# Patient Record
Sex: MALE | Race: WHITE | NOT HISPANIC OR LATINO | Employment: FULL TIME | URBAN - METROPOLITAN AREA
[De-identification: names, ages, dates, MRNs, and addresses within clinical notes are randomized per-mention and may not be internally consistent; named-entity substitution may affect disease eponyms.]

---

## 2018-07-23 PROBLEM — R60.0 PEDAL EDEMA: Status: ACTIVE | Noted: 2018-07-23

## 2018-07-23 PROBLEM — G47.33 OSA ON CPAP: Status: ACTIVE | Noted: 2018-07-23

## 2018-07-23 PROBLEM — E66.01 MORBID OBESITY: Status: ACTIVE | Noted: 2018-07-23

## 2018-07-23 PROBLEM — Z00.00 ROUTINE PHYSICAL EXAMINATION: Status: ACTIVE | Noted: 2018-07-23

## 2018-07-23 PROBLEM — I48.0 PAROXYSMAL ATRIAL FIBRILLATION: Status: ACTIVE | Noted: 2018-07-23

## 2018-08-30 ENCOUNTER — OFFICE VISIT (OUTPATIENT)
Dept: ORTHOPEDICS | Facility: CLINIC | Age: 53
End: 2018-08-30
Payer: COMMERCIAL

## 2018-08-30 VITALS
BODY MASS INDEX: 39.17 KG/M2 | DIASTOLIC BLOOD PRESSURE: 86 MMHG | HEIGHT: 75 IN | WEIGHT: 315 LBS | HEART RATE: 83 BPM | SYSTOLIC BLOOD PRESSURE: 137 MMHG

## 2018-08-30 DIAGNOSIS — M17.12 PRIMARY OSTEOARTHRITIS OF LEFT KNEE: Primary | ICD-10-CM

## 2018-08-30 PROCEDURE — 73562 X-RAY EXAM OF KNEE 3: CPT | Mod: LT,,, | Performed by: ORTHOPAEDIC SURGERY

## 2018-08-30 PROCEDURE — 3008F BODY MASS INDEX DOCD: CPT | Mod: ,,, | Performed by: ORTHOPAEDIC SURGERY

## 2018-08-30 PROCEDURE — 20610 DRAIN/INJ JOINT/BURSA W/O US: CPT | Mod: LT,,, | Performed by: ORTHOPAEDIC SURGERY

## 2018-08-30 PROCEDURE — 99213 OFFICE O/P EST LOW 20 MIN: CPT | Mod: 25,,, | Performed by: ORTHOPAEDIC SURGERY

## 2018-08-30 RX ORDER — METHYLPREDNISOLONE ACETATE 40 MG/ML
40 INJECTION, SUSPENSION INTRA-ARTICULAR; INTRALESIONAL; INTRAMUSCULAR; SOFT TISSUE
Status: DISCONTINUED | OUTPATIENT
Start: 2018-08-30 | End: 2018-08-30 | Stop reason: HOSPADM

## 2018-08-30 RX ADMIN — METHYLPREDNISOLONE ACETATE 40 MG: 40 INJECTION, SUSPENSION INTRA-ARTICULAR; INTRALESIONAL; INTRAMUSCULAR; SOFT TISSUE at 08:08

## 2018-08-30 NOTE — PROCEDURES
Large Joint Aspiration/Injection: L knee  Date/Time: 8/30/2018 8:39 AM  Performed by: Rocco Alexander MD  Authorized by: Rocco Alexander MD     Consent Done?:  Yes (Verbal)  Indications:  Pain  Procedure site marked: Yes    Timeout: Prior to procedure the correct patient, procedure, and site was verified      Location:  Knee  Site:  L knee  Prep: Patient was prepped and draped in usual sterile fashion    Needle size:  25 G  Medications:  40 mg methylPREDNISolone acetate 40 mg/mL; 40 mg methylPREDNISolone acetate 40 mg/mL  Patient tolerance:  Patient tolerated the procedure well with no immediate complications

## 2018-08-30 NOTE — PROGRESS NOTES
[facility]    Subjective:     Chief Complaint:   Chief Complaint   Patient presents with    Left Knee - Pain     Left knee pain x 1 month. States that he started working out and that the pain increased after working out. States that he would like to get an injection today if possible.        Past Medical History:   Diagnosis Date    Atrial fibrillation 2006    Cardioversion    Atrial fibrillation     Hypertension     Sleep apnea        Past Surgical History:   Procedure Laterality Date    CARDIOVERSION  2006    X2    TONSILLECTOMY         Current Outpatient Medications   Medication Sig    clonazePAM (KLONOPIN) 1 MG tablet Take 1 tablet (1 mg total) by mouth 2 (two) times daily as needed for Anxiety.    lisinopril 10 MG tablet Take 10 mg by mouth once daily.     No current facility-administered medications for this visit.        Review of patient's allergies indicates:  No Known Allergies    Family History   Problem Relation Age of Onset    Hypertension Mother     Hypertension Father     Heart disease Father        Social History     Socioeconomic History    Marital status:      Spouse name: Not on file    Number of children: Not on file    Years of education: Not on file    Highest education level: Not on file   Social Needs    Financial resource strain: Not on file    Food insecurity - worry: Not on file    Food insecurity - inability: Not on file    Transportation needs - medical: Not on file    Transportation needs - non-medical: Not on file   Occupational History    Not on file   Tobacco Use    Smoking status: Never Smoker    Smokeless tobacco: Never Used   Substance and Sexual Activity    Alcohol use: Yes     Alcohol/week: 2.5 oz     Types: 5 drink(s) per week     Comment: Socially    Drug use: No    Sexual activity: Not on file   Other Topics Concern    Not on file   Social History Narrative    Not on file       History of present illness: Patient comes in today  for the left knee. He's having a lot of achy pain in the left knee. A lot of grinding and popping. He is status post arthroscopy about a year ago. 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      Review of Systems:    Constitution: Negative for chills, fever, and sweats.  Negative for unexplained weight loss.    HENT:  Negative for headaches and blurry vision.    Cardiovascular:Negative for chest pain or irregular heart beat. Negative for hypertension.    Respiratory:  Negative for cough and shortness of breath.    Gastrointestinal: Negative for abdominal pain, heartburn, melena, nausea, and vomitting.    Genitourinary:  Negative bladder incontinence and dysuria.    Musculoskeletal:  See HPI for details.     Neurological: Negative for numbness.    Psychiatric/Behavioral: Negative for depression.  The patient is not nervous/anxious.      Endocrine: Negative for polyuria    Hematologic/Lymphatic: Negative for bleeding problem.  Does not bruise/bleed easily.    Skin: Negative for poor would healing and rash    Objective:      Physical Examination:    Vital Signs:    Vitals:    08/30/18 0807   BP: 137/86   Pulse: 83       Body mass index is 43.75 kg/m².    This a well-developed, well nourished patient in no acute distress.  They are alert and oriented and cooperative to examination.        Patient has full range of motion of the left knee. His knee is stable to varus valgus anterior posterior stresses he has a 1+ effusion. Significant anterior crepitus. Full range of motion of the right knee.  Pertinent New Results:    XRAY Report / Interpretation:   AP lateral and sunrise views of the left knee done in the office today demonstrate 3 compartment spurring. Moderate osteoarthritis.    Assessment/Plan:      Osteophyte is the left knee. I injected the left knee with Depo-Medrol and lidocaine. He will follow-up in one month      This note was created using Dragon voice recognition  software that occasionally misinterpreted phrases or words.

## 2018-10-22 PROBLEM — Z00.00 ROUTINE PHYSICAL EXAMINATION: Status: RESOLVED | Noted: 2018-07-23 | Resolved: 2018-10-22

## 2019-03-09 ENCOUNTER — HOSPITAL ENCOUNTER (INPATIENT)
Facility: HOSPITAL | Age: 54
LOS: 11 days | Discharge: HOME | DRG: 167 | End: 2019-03-20
Attending: EMERGENCY MEDICINE | Admitting: INTERNAL MEDICINE
Payer: COMMERCIAL

## 2019-03-09 DIAGNOSIS — S27.321A CONTUSION OF LEFT LUNG, INITIAL ENCOUNTER: ICD-10-CM

## 2019-03-09 DIAGNOSIS — S22.42XA CLOSED FRACTURE OF MULTIPLE RIBS OF LEFT SIDE, INITIAL ENCOUNTER: Primary | ICD-10-CM

## 2019-03-09 DIAGNOSIS — I48.0 PAROXYSMAL ATRIAL FIBRILLATION: ICD-10-CM

## 2019-03-09 DIAGNOSIS — S22.42XA MULTIPLE CLOSED FRACTURES OF RIBS OF LEFT SIDE: ICD-10-CM

## 2019-03-09 DIAGNOSIS — G47.33 OSA ON CPAP: ICD-10-CM

## 2019-03-09 DIAGNOSIS — E66.01 MORBID OBESITY: ICD-10-CM
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
## 2019-03-09 DIAGNOSIS — R07.9 CHEST PAIN: ICD-10-CM

## 2019-03-09 PROBLEM — F41.1 GAD (GENERALIZED ANXIETY DISORDER): Status: ACTIVE | Noted: 2019-03-09

## 2019-03-09 LAB
AMORPH CRY URNS QL MICRO: NORMAL
ANION GAP SERPL CALC-SCNC: 12 MMOL/L
BACTERIA #/AREA URNS HPF: NORMAL /HPF
BASOPHILS # BLD AUTO: 0 K/UL
BASOPHILS NFR BLD: 0.2 %
BILIRUB UR QL STRIP: ABNORMAL
BUN SERPL-MCNC: 15 MG/DL
CALCIUM SERPL-MCNC: 9.5 MG/DL
CHLORIDE SERPL-SCNC: 108 MMOL/L
CLARITY UR: CLEAR
CO2 SERPL-SCNC: 18 MMOL/L
COLOR UR: YELLOW
CREAT SERPL-MCNC: 1.1 MG/DL
DIFFERENTIAL METHOD: ABNORMAL
EOSINOPHIL # BLD AUTO: 0 K/UL
EOSINOPHIL NFR BLD: 0 %
ERYTHROCYTE [DISTWIDTH] IN BLOOD BY AUTOMATED COUNT: 15.2 %
EST. GFR  (AFRICAN AMERICAN): >60 ML/MIN/1.73 M^2
EST. GFR  (NON AFRICAN AMERICAN): >60 ML/MIN/1.73 M^2
GLUCOSE SERPL-MCNC: 175 MG/DL
GLUCOSE UR QL STRIP: NEGATIVE
HCT VFR BLD AUTO: 44.1 %
HGB BLD-MCNC: 14.5 G/DL
HGB UR QL STRIP: ABNORMAL
HYALINE CASTS #/AREA URNS LPF: 1 /LPF
KETONES UR QL STRIP: NEGATIVE
LEUKOCYTE ESTERASE UR QL STRIP: NEGATIVE
LYMPHOCYTES # BLD AUTO: 0.8 K/UL
LYMPHOCYTES NFR BLD: 3.2 %
MCH RBC QN AUTO: 28.8 PG
MCHC RBC AUTO-ENTMCNC: 32.9 G/DL
MCV RBC AUTO: 88 FL
MICROSCOPIC COMMENT: NORMAL
MONOCYTES # BLD AUTO: 1.1 K/UL
MONOCYTES NFR BLD: 4.5 %
NEUTROPHILS # BLD AUTO: 22.5 K/UL
NEUTROPHILS NFR BLD: 92.1 %
NITRITE UR QL STRIP: NEGATIVE
PH UR STRIP: 5 [PH] (ref 5–8)
PLATELET # BLD AUTO: 280 K/UL
PMV BLD AUTO: 7.5 FL
POCT GLUCOSE: 209 MG/DL (ref 70–110)
POTASSIUM SERPL-SCNC: 3.8 MMOL/L
PROT UR QL STRIP: ABNORMAL
RBC # BLD AUTO: 5.03 M/UL
RBC #/AREA URNS HPF: 1 /HPF (ref 0–4)
SODIUM SERPL-SCNC: 138 MMOL/L
SP GR UR STRIP: >=1.03 (ref 1–1.03)
SQUAMOUS #/AREA URNS HPF: 2 /HPF
TROPONIN I SERPL DL<=0.01 NG/ML-MCNC: <0.006 NG/ML
TROPONIN I SERPL DL<=0.01 NG/ML-MCNC: <0.006 NG/ML
URN SPEC COLLECT METH UR: ABNORMAL
UROBILINOGEN UR STRIP-ACNC: NEGATIVE EU/DL
WBC # BLD AUTO: 24.4 K/UL
WBC #/AREA URNS HPF: 1 /HPF (ref 0–5)

## 2019-03-09 PROCEDURE — 85025 COMPLETE CBC W/AUTO DIFF WBC: CPT

## 2019-03-09 PROCEDURE — 63600175 PHARM REV CODE 636 W HCPCS: Performed by: INTERNAL MEDICINE

## 2019-03-09 PROCEDURE — 84484 ASSAY OF TROPONIN QUANT: CPT

## 2019-03-09 PROCEDURE — 81000 URINALYSIS NONAUTO W/SCOPE: CPT

## 2019-03-09 PROCEDURE — 96375 TX/PRO/DX INJ NEW DRUG ADDON: CPT

## 2019-03-09 PROCEDURE — 96376 TX/PRO/DX INJ SAME DRUG ADON: CPT

## 2019-03-09 PROCEDURE — 63600175 PHARM REV CODE 636 W HCPCS: Performed by: EMERGENCY MEDICINE

## 2019-03-09 PROCEDURE — 94760 N-INVAS EAR/PLS OXIMETRY 1: CPT

## 2019-03-09 PROCEDURE — 25000003 PHARM REV CODE 250: Performed by: INTERNAL MEDICINE

## 2019-03-09 PROCEDURE — 99284 EMERGENCY DEPT VISIT MOD MDM: CPT | Mod: 25

## 2019-03-09 PROCEDURE — 25000003 PHARM REV CODE 250: Performed by: EMERGENCY MEDICINE

## 2019-03-09 PROCEDURE — 83036 HEMOGLOBIN GLYCOSYLATED A1C: CPT

## 2019-03-09 PROCEDURE — 80048 BASIC METABOLIC PNL TOTAL CA: CPT

## 2019-03-09 PROCEDURE — 12000002 HC ACUTE/MED SURGE SEMI-PRIVATE ROOM

## 2019-03-09 PROCEDURE — 36415 COLL VENOUS BLD VENIPUNCTURE: CPT

## 2019-03-09 PROCEDURE — 96374 THER/PROPH/DIAG INJ IV PUSH: CPT

## 2019-03-09 RX ORDER — OXYCODONE AND ACETAMINOPHEN 10; 325 MG/1; MG/1
1 TABLET ORAL EVERY 4 HOURS PRN
Status: DISCONTINUED | OUTPATIENT
Start: 2019-03-09 | End: 2019-03-10

## 2019-03-09 RX ORDER — LANOLIN ALCOHOL/MO/W.PET/CERES
800 CREAM (GRAM) TOPICAL
Status: DISCONTINUED | OUTPATIENT
Start: 2019-03-09 | End: 2019-03-20 | Stop reason: HOSPADM

## 2019-03-09 RX ORDER — POTASSIUM CHLORIDE 20 MEQ/15ML
40 SOLUTION ORAL
Status: DISCONTINUED | OUTPATIENT
Start: 2019-03-09 | End: 2019-03-20 | Stop reason: HOSPADM

## 2019-03-09 RX ORDER — ENOXAPARIN SODIUM 100 MG/ML
40 INJECTION SUBCUTANEOUS EVERY 24 HOURS
Status: DISCONTINUED | OUTPATIENT
Start: 2019-03-09 | End: 2019-03-14

## 2019-03-09 RX ORDER — SODIUM CHLORIDE 9 MG/ML
INJECTION, SOLUTION INTRAVENOUS CONTINUOUS
Status: ACTIVE | OUTPATIENT
Start: 2019-03-09 | End: 2019-03-10

## 2019-03-09 RX ORDER — GLUCAGON 1 MG
1 KIT INJECTION
Status: DISCONTINUED | OUTPATIENT
Start: 2019-03-09 | End: 2019-03-20 | Stop reason: HOSPADM

## 2019-03-09 RX ORDER — HYDROMORPHONE HYDROCHLORIDE 2 MG/ML
2 INJECTION, SOLUTION INTRAMUSCULAR; INTRAVENOUS; SUBCUTANEOUS
Status: COMPLETED | OUTPATIENT
Start: 2019-03-09 | End: 2019-03-09

## 2019-03-09 RX ORDER — LISINOPRIL 10 MG/1
10 TABLET ORAL DAILY
Status: DISCONTINUED | OUTPATIENT
Start: 2019-03-10 | End: 2019-03-12

## 2019-03-09 RX ORDER — ONDANSETRON 2 MG/ML
4 INJECTION INTRAMUSCULAR; INTRAVENOUS EVERY 8 HOURS PRN
Status: DISCONTINUED | OUTPATIENT
Start: 2019-03-09 | End: 2019-03-20 | Stop reason: HOSPADM

## 2019-03-09 RX ORDER — IBUPROFEN 200 MG
16 TABLET ORAL
Status: DISCONTINUED | OUTPATIENT
Start: 2019-03-09 | End: 2019-03-20 | Stop reason: HOSPADM

## 2019-03-09 RX ORDER — IBUPROFEN 200 MG
24 TABLET ORAL
Status: DISCONTINUED | OUTPATIENT
Start: 2019-03-09 | End: 2019-03-20 | Stop reason: HOSPADM

## 2019-03-09 RX ORDER — HYDROMORPHONE HYDROCHLORIDE 2 MG/ML
1 INJECTION, SOLUTION INTRAMUSCULAR; INTRAVENOUS; SUBCUTANEOUS EVERY 4 HOURS PRN
Status: DISCONTINUED | OUTPATIENT
Start: 2019-03-09 | End: 2019-03-20 | Stop reason: HOSPADM

## 2019-03-09 RX ORDER — INSULIN ASPART 100 [IU]/ML
0-5 INJECTION, SOLUTION INTRAVENOUS; SUBCUTANEOUS
Status: DISCONTINUED | OUTPATIENT
Start: 2019-03-09 | End: 2019-03-20 | Stop reason: HOSPADM

## 2019-03-09 RX ORDER — SODIUM CHLORIDE 0.9 % (FLUSH) 0.9 %
5 SYRINGE (ML) INJECTION
Status: DISCONTINUED | OUTPATIENT
Start: 2019-03-09 | End: 2019-03-20 | Stop reason: HOSPADM

## 2019-03-09 RX ORDER — CLONAZEPAM 1 MG/1
1 TABLET ORAL 2 TIMES DAILY PRN
Status: DISCONTINUED | OUTPATIENT
Start: 2019-03-09 | End: 2019-03-10

## 2019-03-09 RX ORDER — AMOXICILLIN 250 MG
1 CAPSULE ORAL 2 TIMES DAILY
Status: DISCONTINUED | OUTPATIENT
Start: 2019-03-09 | End: 2019-03-20 | Stop reason: HOSPADM

## 2019-03-09 RX ORDER — KETAMINE HYDROCHLORIDE 10 MG/ML
40 INJECTION, SOLUTION INTRAMUSCULAR; INTRAVENOUS
Status: COMPLETED | OUTPATIENT
Start: 2019-03-09 | End: 2019-03-09

## 2019-03-09 RX ORDER — ACETAMINOPHEN 325 MG/1
650 TABLET ORAL EVERY 8 HOURS PRN
Status: DISCONTINUED | OUTPATIENT
Start: 2019-03-09 | End: 2019-03-17

## 2019-03-09 RX ADMIN — PROMETHAZINE HYDROCHLORIDE 6.25 MG: 25 INJECTION INTRAMUSCULAR; INTRAVENOUS at 07:03

## 2019-03-09 RX ADMIN — CLONAZEPAM 1 MG: 1 TABLET ORAL at 07:03

## 2019-03-09 RX ADMIN — ENOXAPARIN SODIUM 40 MG: 100 INJECTION SUBCUTANEOUS at 08:03

## 2019-03-09 RX ADMIN — HYDROMORPHONE HYDROCHLORIDE 2 MG: 2 INJECTION, SOLUTION INTRAMUSCULAR; INTRAVENOUS; SUBCUTANEOUS at 03:03

## 2019-03-09 RX ADMIN — HYDROMORPHONE HYDROCHLORIDE 1 MG: 2 INJECTION, SOLUTION INTRAMUSCULAR; INTRAVENOUS; SUBCUTANEOUS at 10:03

## 2019-03-09 RX ADMIN — KETAMINE HYDROCHLORIDE 40 MG: 10 INJECTION, SOLUTION INTRAMUSCULAR; INTRAVENOUS at 02:03

## 2019-03-09 RX ADMIN — OXYCODONE HYDROCHLORIDE AND ACETAMINOPHEN 1 TABLET: 10; 325 TABLET ORAL at 07:03

## 2019-03-09 RX ADMIN — HYDROMORPHONE HYDROCHLORIDE 2 MG: 2 INJECTION, SOLUTION INTRAMUSCULAR; INTRAVENOUS; SUBCUTANEOUS at 05:03

## 2019-03-09 RX ADMIN — SODIUM CHLORIDE: 0.9 INJECTION, SOLUTION INTRAVENOUS at 08:03

## 2019-03-09 NOTE — ED NOTES
""Room not yet clean" per 3rd Floor staff. Pt/family aware of need to continue in ED presently. Sitting in W/C for comfort. Exam bed linens changed @ this time & pt gowned for admission.  Tolerated well s/p meds  "

## 2019-03-09 NOTE — ED NOTES
Pt suddenly became very agitated yelling and screaming as RN walked by room, pt forcefully and quickly moved himself to the very end of the bed almost out of bed , re orientated and stopped from getting out of bed security and family at bedside. Pt reassured and calmed able to move self back into bed laying down. Pt states that he did not know how to process pain and it was too much so he urinated on himself.   MD brought to bedside questions answered with family. Primary RN updated on pt status

## 2019-03-09 NOTE — ED NOTES
Lying on Rt side for comfort. Supported in position by 2 ea pillows. Tolerated well. Awaiting availability of CT

## 2019-03-09 NOTE — ED NOTES
Awaiting radiology read of chest CT. Offered additional pillows for comfort @ this time. Family x 2 @ bedside.

## 2019-03-09 NOTE — SUBJECTIVE & OBJECTIVE
Past Medical History:   Diagnosis Date    Atrial fibrillation 2006    Cardioversion    Atrial fibrillation     Hypertension     Sleep apnea        Past Surgical History:   Procedure Laterality Date    CARDIOVERSION  2006    X2    REPAIR, HERNIA, UMBILICAL, AGE 4 YEARS OR OLDER N/A 11/19/2013    Performed by Ad[facility] Horne MD at [facility] OR    TONSILLECTOMY         Review of patient's allergies indicates:  No Known Allergies    No current facility-administered medications on file prior to encounter.      Current Outpatient Medications on File Prior to Encounter   Medication Sig    clonazePAM (KLONOPIN) 1 MG tablet Take 1 tablet (1 mg total) by mouth 2 (two) times daily as needed for Anxiety.    lisinopril 10 MG tablet Take 10 mg by mouth once daily.     Family History     Problem Relation (Age of Onset)    Heart disease Father    Hypertension Mother, Father        Tobacco Use    Smoking status: Never Smoker    Smokeless tobacco: Never Used   Substance and Sexual Activity    Alcohol use: Yes     Alcohol/week: 2.5 oz     Types: 5 drink(s) per week     Comment: Socially    Drug use: No    Sexual activity: Not on file     Review of Systems   Constitutional: Negative for chills and fever.   HENT: Negative for congestion and sore throat.    Eyes: Negative for visual disturbance.   Respiratory: Positive for shortness of breath. Negative for cough.    Cardiovascular: Positive for chest pain (Pleuritic).   Gastrointestinal: Negative for abdominal distention, abdominal pain, constipation, diarrhea, nausea and vomiting.   Genitourinary: Negative for dysuria and hematuria.   Musculoskeletal: Positive for back pain.   Skin: Positive for rash.   Neurological: Negative for weakness, numbness and headaches.   Psychiatric/Behavioral: Negative for agitation and confusion.     Objective:     Vital Signs (Most Recent):  Temp: 97.3 °F (36.3 °C) (03/09/19 1425)  Pulse: 109 (03/09/19 1708)  Resp: (!) 22 (03/09/19 1425)  BP:  (!) 161/88 (03/09/19 1538)  SpO2: 96 % (03/09/19 1708) Vital Signs (24h Range):  Temp:  [97.3 °F (36.3 °C)] 97.3 °F (36.3 °C)  Pulse:  [106-117] 109  Resp:  [22] 22  SpO2:  [93 %-96 %] 96 %  BP: (150-176)/() 161/88     Weight: (!) 167.8 kg (370 lb)  Body mass index is 46.25 kg/m².    Physical Exam   Constitutional: He is oriented to person, place, and time. He appears well-developed and well-nourished. No distress.   HENT:   Head: Normocephalic and atraumatic.   Eyes: EOM are normal. Pupils are equal, round, and reactive to light.   Neck: Normal range of motion. Neck supple.   Cardiovascular: Regular rhythm.   Tachy but regular   Pulmonary/Chest: Effort normal and breath sounds normal. He has no rales.   Abdominal: Soft. Bowel sounds are normal. There is no tenderness.   Musculoskeletal: Normal range of motion.   Neurological: He is alert and oriented to person, place, and time.   Skin: Skin is warm and dry.   Excoriations on L lateral shoulder from fall         CRANIAL NERVES     CN III, IV, VI   Pupils are equal, round, and reactive to light.  Extraocular motions are normal.        Significant Labs:   CBC:   Recent Labs   Lab 03/09/19  1541   WBC 24.40*   HGB 14.5   HCT 44.1        CMP:   Recent Labs   Lab 03/09/19  1541      K 3.8      CO2 18*   *   BUN 15   CREATININE 1.1   CALCIUM 9.5   ANIONGAP 12   EGFRNONAA >60     Cardiac Markers: No results for input(s): CKMB, MYOGLOBIN, BNP, TROPISTAT in the last 48 hours.  TSH: No results for input(s): TSH in the last 4320 hours.  Urine Culture: No results for input(s): LABURIN in the last 48 hours.  Urine Studies: No results for input(s): COLORU, APPEARANCEUA, PHUR, SPECGRAV, PROTEINUA, GLUCUA, KETONESU, BILIRUBINUA, OCCULTUA, NITRITE, UROBILINOGEN, LEUKOCYTESUR, RBCUA, WBCUA, BACTERIA, SQUAMEPITHEL, HYALINECASTS in the last 48 hours.    Invalid input(s): JOCE    Significant Imaging: I have reviewed and interpreted all pertinent  imaging results/findings within the past 24 hours.     CT chest  Impression:       Acute fractures of the left 2nd through 8th ribs.  Edge to edge apposition of the lower ribs with bayonet shortening of the 5th rib.  There is  subcutaneous emphysema however a pneumothorax is not seen.  Three small bands of pulmonary contusion noted in the left upper lobe.  Other fractures are not identified.  Mediastinal hemorrhage or hematoma is not seen.     XR L Scapula  Impression:       Negative plain x-rays of the left scapula.  Acute fractures of the left 2nd 3rd 4th and 5th ribs with subcutaneous emphysema and possible pneumothorax.    Results were discussed with Dr. Wood and a CT of the chest will be obtained.     XR L Shoulder  Impression:       Acute fractures of the left 2nd 3rd 4th and 5th ribs with a possible small left apical pneumothorax and a small amount of subcutaneous emphysema.  A fracture of the scapula or of the shoulder itself is not seen.  Dislocation is not noted.  A GREGORIO or on

## 2019-03-09 NOTE — HPI
54 y.o. male  has a past medical history of Atrial fibrillation (2006), Atrial fibrillation, Hypertension, and Sleep apnea who presents to the ED via EMS for evaluation of left shoulder pain. Pt reports being in his normal state of health until just prior to arrival when he began experiencing severe L shoulder pain after falling from horse. In the ED pt was treated with multiple doses of IV dilaudid before experiencing any relief. Imaging preformed in the ED showed multiple rib fractures. Given significant pain medication requirement Hospital medicine was consulted for admission.     Other than presenting compliant pt reports doing well. Denies prior complaints.

## 2019-03-09 NOTE — ED PROVIDER NOTES
Encounter Date: 3/9/2019    SCRIBE #1 NOTE: I, Yee Chacon, am scribing for, and in the presence of, Bret Wood III, MD.       History     Chief Complaint   Patient presents with    Fall     fell off of horse co lt shoulder pain       Time seen by provider: 2:27 PM on 03/09/2019    Bird Feng is a 54 y.o. male who presents to the ED via EMS for evaluation of left shoulder pain. Just prior to arrival, the patient fell off a horse onto his left side, resulting in severe left shoulder pain. Upon arrival to the ED, the patient's left arm is in a sling and he is screaming in pain despite receiving 2mg of Dilaudid en route.  The patient denies neck pain or hitting his head. PMHx: HTN, Atrial Fibrillation, MANUELA. No pertinent SHx noted. Never Smoker.  No known drug allergies noted.       The history is provided by the patient.     Review of patient's allergies indicates:  No Known Allergies  Past Medical History:   Diagnosis Date    Atrial fibrillation 2006    Cardioversion    Atrial fibrillation     Hypertension     Sleep apnea      Past Surgical History:   Procedure Laterality Date    CARDIOVERSION  2006    X2    REPAIR, HERNIA, UMBILICAL, AGE 4 YEARS OR OLDER N/A 11/19/2013    Performed by Ad[facility]orne MD at [facility] OR    TONSILLECTOMY       Family History   Problem Relation Age of Onset    Hypertension Mother     Hypertension Father     Heart disease Father      Social History     Tobacco Use    Smoking status: Never Smoker    Smokeless tobacco: Never Used   Substance Use Topics    Alcohol use: Yes     Alcohol/week: 2.5 oz     Types: 5 drink(s) per week     Comment: Socially    Drug use: No     Review of Systems   Constitutional: Negative for activity change, appetite change, chills, fatigue and fever.   Eyes: Negative for visual disturbance.   Respiratory: Negative for apnea and shortness of breath.    Cardiovascular: Negative for chest pain and palpitations.   Gastrointestinal: Negative  for abdominal distention and abdominal pain.   Genitourinary: Negative for difficulty urinating.   Musculoskeletal: Positive for arthralgias (left shoulder). Negative for neck pain.   Skin: Negative for pallor and rash.   Neurological: Negative for headaches.   Hematological: Does not bruise/bleed easily.   Psychiatric/Behavioral: Negative for agitation.       Physical Exam     Initial Vitals [03/09/19 1425]   BP Pulse Resp Temp SpO2   (!) 176/100 110 (!) 22 97.3 °F (36.3 °C) 96 %      MAP       --         Physical Exam    Nursing note and vitals reviewed.  Constitutional: He appears well-developed and well-nourished.   HENT:   Head: Normocephalic and atraumatic.   Eyes: Conjunctivae are normal.   Neck: Normal range of motion. Neck supple.   Cardiovascular: Normal rate, regular rhythm and normal heart sounds. Exam reveals no gallop and no friction rub.    No murmur heard.  Pulmonary/Chest: Breath sounds normal. No respiratory distress. He has no wheezes. He has no rhonchi. He has no rales.   No clavicular tenderness.    Abdominal: Soft. He exhibits no distension. There is no tenderness.   Musculoskeletal: Normal range of motion.        Cervical back: He exhibits tenderness.   Tenderness over the left scapula without crepitus. Left shoulder has limited ROM d/t pain.    Neurological: He is alert and oriented to person, place, and time.   Skin: Skin is warm and dry. Abrasion noted.   Abrasions over left scapula and posterior arm.    Psychiatric: He has a normal mood and affect.         ED Course   Procedures  Labs Reviewed   CBC W/ AUTO DIFFERENTIAL - Abnormal; Notable for the following components:       Result Value    WBC 24.40 (*)     RDW 15.2 (*)     MPV 7.5 (*)     Gran # (ANC) 22.5 (*)     Lymph # 0.8 (*)     Mono # 1.1 (*)     Gran% 92.1 (*)     Lymph% 3.2 (*)     All other components within normal limits   BASIC METABOLIC PANEL - Abnormal; Notable for the following components:    CO2 18 (*)     Glucose 175 (*)      All other components within normal limits          Imaging Results          CT Chest Without Contrast (Final result)  Result time 03/09/19 17:13:21    Final result by Royal Mathew Jr., MD (03/09/19 17:13:21)                 Impression:      Acute fractures of the left 2nd through 8th ribs.  Edge to edge apposition of the lower ribs with bayonet shortening of the 5th rib.  There is  subcutaneous emphysema however a pneumothorax is not seen.  Three small bands of pulmonary contusion noted in the left upper lobe.  Other fractures are not identified.  Mediastinal hemorrhage or hematoma is not seen.      Electronically signed by: Royal Mathew MD  Date:    03/09/2019  Time:    17:13             Narrative:    EXAMINATION:  CT CHEST WITHOUT CONTRAST    CLINICAL HISTORY:  Rib fractures;    TECHNIQUE:  Low dose axial images, sagittal and coronal reformations were obtained from the thoracic inlet to the lung bases. Contrast was not administered.  The patient was prone on the CT table due to pain but the images or reversed for interpretation.  The    COMPARISON:  Shoulder and scapula x-rays of 9 March 2019.    FINDINGS:  There are acute fractures of the left 2nd 3rd 4th 5th 6th 7th and 8th ribs laterally.  The 8th, 7th and 6th ribs display edge to edge apposition but the 5th rib fragments display mild bayonet shortening.  There is subcutaneous emphysema seen in the left chest wall and left shoulder.  Acute fractures of the right ribs, Of the thoracic spine, or of the blade of the scapula is not seen. The sternum appears intact    In the mediastinum the heart is not enlarged.  There is a large amount of pericardial fat demonstrated.  A hemorrhage or hematoma within the mediastinum is not seen.    On this study a pneumothorax is not seen.  There is however pulmonary contusion identified in the left upper lobe in 3 small bands projecting toward the hilum.                               X-Ray Shoulder 2 or More Views  Left (Final result)  Result time 03/09/19 15:23:22    Final result by Royal Mathew Jr., MD (03/09/19 15:23:22)                 Impression:      Acute fractures of the left 2nd 3rd 4th and 5th ribs with a possible small left apical pneumothorax and a small amount of subcutaneous emphysema.  A fracture of the scapula or of the shoulder itself is not seen.  Dislocation is not noted.  A GREGORIO or on      Electronically signed by: Royal Mathew MD  Date:    03/09/2019  Time:    15:23             Narrative:    EXAMINATION:  XR SHOULDER COMPLETE 2 OR MORE VIEWS LEFT    CLINICAL HISTORY:  fall;    TECHNIQUE:  Two or three views of the left shoulder were performed.    COMPARISON:  Prior shoulder of March 9, 2019 at 14:53 1 -10 which are down the    FINDINGS:  No dislocation is seen.  The humerus scapula and clavicle are intact.  The acromioclavicular and glenohumeral joints are intact.    The patient however is seen to have acute fractures of the left 2nd 3rd 4th and 5th ribs and a a small amount of subcutaneous emphysema.  A left apical pneumothorax is also suspected.                               X-Ray Scapula Left (Final result)  Result time 03/09/19 15:24:35    Final result by Royal Mathew Jr., MD (03/09/19 15:24:35)                 Impression:      Negative plain x-rays of the left scapula.  Acute fractures of the left 2nd 3rd 4th and 5th ribs with subcutaneous emphysema and possible pneumothorax.    Results were discussed with Dr. Wood and a CT of the chest will be obtained.      Electronically signed by: Royal Mathew MD  Date:    03/09/2019  Time:    15:24             Narrative:    EXAMINATION:  XR SCAPULA LEFT    CLINICAL HISTORY:  Unspecified fall, initial encounter    TECHNIQUE:  Two views of the left scapula were performed.    COMPARISON:  None    FINDINGS:  On the provided images a fracture of the left scapula is not seen.  The humerus in the clavicle appear intact.  There are however acute  fractures of the left 2nd 3rd 4th and 5th ribs and subcutaneous emphysema noted.  A left pneumothorax is suspected.                                 Medical Decision Making:   History:   Old Medical Records: I decided to obtain old medical records.  Clinical Tests:   Radiological Study: Ordered and Reviewed  ED Management:  54-year-old male presents with severe left shoulder and side pain after falling from a horse.  X-rays independently interpreted by me revealed rib fractures 2 3 and 4.  CT of the chest reveals subcutaneous emphysema without definitive evidence of pneumothorax.  He has displaced fractures of ribs 2 through 8.  There is no mediastinal abnormality.  Cervical spine is cleared clinically with a MacArthur cervical spine rules.  He has no evidence of head injury. He will be admitted for pain control.  Other:   I have discussed this case with another health care provider.       <> Summary of the Discussion: Discussed with Dr. Morgan who will admit the patient       APC / Resident Notes:   I, Dr. Bret Wood III, personally performed the services described in this documentation. All medical record entries made by the scribe were at my direction and in my presence.  I have reviewed the chart and agree that the record reflects my personal performance and is accurate and complete       Scribe Attestation:   Scribe #1: I performed the above scribed service and the documentation accurately describes the services I performed. I attest to the accuracy of the note.               Clinical Impression:     1. Closed fracture of multiple ribs of left side, initial encounter    2. Fall    3. Contusion of left lung, initial encounter          Disposition:   Disposition: Admitted  Condition: Stable                        Bret Wood III, MD  03/09/19 1916

## 2019-03-10 LAB
ANION GAP SERPL CALC-SCNC: 13 MMOL/L
BASOPHILS # BLD AUTO: 0 K/UL
BASOPHILS NFR BLD: 0.1 %
BUN SERPL-MCNC: 18 MG/DL
CALCIUM SERPL-MCNC: 9.4 MG/DL
CHLORIDE SERPL-SCNC: 108 MMOL/L
CO2 SERPL-SCNC: 17 MMOL/L
CREAT SERPL-MCNC: 1.1 MG/DL
DIFFERENTIAL METHOD: ABNORMAL
EOSINOPHIL # BLD AUTO: 0 K/UL
EOSINOPHIL NFR BLD: 0 %
ERYTHROCYTE [DISTWIDTH] IN BLOOD BY AUTOMATED COUNT: 15.4 %
EST. GFR  (AFRICAN AMERICAN): >60 ML/MIN/1.73 M^2
EST. GFR  (NON AFRICAN AMERICAN): >60 ML/MIN/1.73 M^2
ESTIMATED AVG GLUCOSE: 134 MG/DL
GLUCOSE SERPL-MCNC: 172 MG/DL
HBA1C MFR BLD HPLC: 6.3 %
HCT VFR BLD AUTO: 44.3 %
HGB BLD-MCNC: 14.6 G/DL
LYMPHOCYTES # BLD AUTO: 1.3 K/UL
LYMPHOCYTES NFR BLD: 7.5 %
MCH RBC QN AUTO: 29.1 PG
MCHC RBC AUTO-ENTMCNC: 33 G/DL
MCV RBC AUTO: 88 FL
MONOCYTES # BLD AUTO: 1.6 K/UL
MONOCYTES NFR BLD: 9.2 %
NEUTROPHILS # BLD AUTO: 14.4 K/UL
NEUTROPHILS NFR BLD: 83.2 %
PLATELET # BLD AUTO: 301 K/UL
PMV BLD AUTO: 7.7 FL
POCT GLUCOSE: 126 MG/DL (ref 70–110)
POCT GLUCOSE: 147 MG/DL (ref 70–110)
POCT GLUCOSE: 148 MG/DL (ref 70–110)
POCT GLUCOSE: 162 MG/DL (ref 70–110)
POTASSIUM SERPL-SCNC: 4.3 MMOL/L
RBC # BLD AUTO: 5.02 M/UL
SODIUM SERPL-SCNC: 138 MMOL/L
TROPONIN I SERPL DL<=0.01 NG/ML-MCNC: <0.006 NG/ML
TROPONIN I SERPL DL<=0.01 NG/ML-MCNC: <0.006 NG/ML
WBC # BLD AUTO: 17.3 K/UL

## 2019-03-10 PROCEDURE — 94761 N-INVAS EAR/PLS OXIMETRY MLT: CPT

## 2019-03-10 PROCEDURE — 84484 ASSAY OF TROPONIN QUANT: CPT

## 2019-03-10 PROCEDURE — 12000002 HC ACUTE/MED SURGE SEMI-PRIVATE ROOM

## 2019-03-10 PROCEDURE — 99900035 HC TECH TIME PER 15 MIN (STAT)

## 2019-03-10 PROCEDURE — 80048 BASIC METABOLIC PNL TOTAL CA: CPT

## 2019-03-10 PROCEDURE — 25000003 PHARM REV CODE 250: Performed by: NURSE PRACTITIONER

## 2019-03-10 PROCEDURE — 36415 COLL VENOUS BLD VENIPUNCTURE: CPT

## 2019-03-10 PROCEDURE — 63600175 PHARM REV CODE 636 W HCPCS: Performed by: INTERNAL MEDICINE

## 2019-03-10 PROCEDURE — 25000003 PHARM REV CODE 250: Performed by: INTERNAL MEDICINE

## 2019-03-10 PROCEDURE — 85025 COMPLETE CBC W/AUTO DIFF WBC: CPT

## 2019-03-10 RX ORDER — OXYCODONE AND ACETAMINOPHEN 10; 325 MG/1; MG/1
1 TABLET ORAL EVERY 4 HOURS PRN
Status: DISCONTINUED | OUTPATIENT
Start: 2019-03-10 | End: 2019-03-10

## 2019-03-10 RX ORDER — OXYCODONE AND ACETAMINOPHEN 10; 325 MG/1; MG/1
1 TABLET ORAL EVERY 4 HOURS
Status: DISCONTINUED | OUTPATIENT
Start: 2019-03-10 | End: 2019-03-16

## 2019-03-10 RX ORDER — CLONAZEPAM 1 MG/1
1 TABLET ORAL ONCE
Status: COMPLETED | OUTPATIENT
Start: 2019-03-10 | End: 2019-03-10

## 2019-03-10 RX ORDER — MAG HYDROX/ALUMINUM HYD/SIMETH 200-200-20
30 SUSPENSION, ORAL (FINAL DOSE FORM) ORAL EVERY 4 HOURS PRN
Status: DISCONTINUED | OUTPATIENT
Start: 2019-03-10 | End: 2019-03-20 | Stop reason: HOSPADM

## 2019-03-10 RX ORDER — CLONAZEPAM 1 MG/1
1 TABLET ORAL 3 TIMES DAILY PRN
Status: DISCONTINUED | OUTPATIENT
Start: 2019-03-10 | End: 2019-03-20 | Stop reason: HOSPADM

## 2019-03-10 RX ADMIN — CLONAZEPAM 1 MG: 1 TABLET ORAL at 08:03

## 2019-03-10 RX ADMIN — OXYCODONE HYDROCHLORIDE AND ACETAMINOPHEN 1 TABLET: 10; 325 TABLET ORAL at 05:03

## 2019-03-10 RX ADMIN — HYDROMORPHONE HYDROCHLORIDE 1 MG: 2 INJECTION, SOLUTION INTRAMUSCULAR; INTRAVENOUS; SUBCUTANEOUS at 07:03

## 2019-03-10 RX ADMIN — OXYCODONE HYDROCHLORIDE AND ACETAMINOPHEN 1 TABLET: 10; 325 TABLET ORAL at 12:03

## 2019-03-10 RX ADMIN — SENNOSIDES AND DOCUSATE SODIUM 1 TABLET: 8.6; 5 TABLET ORAL at 09:03

## 2019-03-10 RX ADMIN — LISINOPRIL 10 MG: 10 TABLET ORAL at 08:03

## 2019-03-10 RX ADMIN — HYDROMORPHONE HYDROCHLORIDE 1 MG: 2 INJECTION, SOLUTION INTRAMUSCULAR; INTRAVENOUS; SUBCUTANEOUS at 03:03

## 2019-03-10 RX ADMIN — CLONAZEPAM 1 MG: 1 TABLET ORAL at 02:03

## 2019-03-10 RX ADMIN — OXYCODONE HYDROCHLORIDE AND ACETAMINOPHEN 1 TABLET: 10; 325 TABLET ORAL at 09:03

## 2019-03-10 RX ADMIN — ALUMINUM HYDROXIDE, MAGNESIUM HYDROXIDE, AND SIMETHICONE 30 ML: 200; 200; 20 SUSPENSION ORAL at 10:03

## 2019-03-10 RX ADMIN — SENNOSIDES AND DOCUSATE SODIUM 1 TABLET: 8.6; 5 TABLET ORAL at 08:03

## 2019-03-10 RX ADMIN — OXYCODONE HYDROCHLORIDE AND ACETAMINOPHEN 1 TABLET: 10; 325 TABLET ORAL at 10:03

## 2019-03-10 RX ADMIN — HYDROMORPHONE HYDROCHLORIDE 1 MG: 2 INJECTION, SOLUTION INTRAMUSCULAR; INTRAVENOUS; SUBCUTANEOUS at 11:03

## 2019-03-10 RX ADMIN — ENOXAPARIN SODIUM 40 MG: 100 INJECTION SUBCUTANEOUS at 05:03

## 2019-03-10 RX ADMIN — OXYCODONE HYDROCHLORIDE AND ACETAMINOPHEN 1 TABLET: 10; 325 TABLET ORAL at 02:03

## 2019-03-10 NOTE — ASSESSMENT & PLAN NOTE
Body mass index is 46.25 kg/m². Morbid obesity complicates all aspects of disease management from diagnostic modalities to treatment. Weight loss encouraged and health benefits explained to patient.

## 2019-03-10 NOTE — PLAN OF CARE
03/10/19 1807   Discharge Assessment   Assessment Type Discharge Planning Assessment   Confirmed/corrected address and phone number on facesheet? Yes   Assessment information obtained from? Patient;Caregiver   Prior to hospitilization cognitive status: Alert/Oriented   Prior to hospitalization functional status: Independent   Current cognitive status: Alert/Oriented   Current Functional Status: Independent   Facility Arrived From: home   Lives With spouse;child(dang), dependent   Able to Return to Prior Arrangements yes   Is patient able to care for self after discharge? Yes   Who are your caregiver(s) and their phone number(s)? spouse:  Lashay Jung  [phone]   Patient's perception of discharge disposition home or selfcare   Readmission Within the Last 30 Days no previous admission in last 30 days   Patient currently being followed by outpatient case management? No   Patient currently receives any other outside agency services? No   Equipment Currently Used at Home CPAP   Do you have any problems affording any of your prescribed medications? No   Is the patient taking medications as prescribed? yes   Does the patient have transportation home? Yes   Transportation Anticipated family or friend will provide   Does the patient receive services at the [facility] Clinic? No   Discharge Plan A Home with family   Discharge Plan B Home with family   DME Needed Upon Discharge  none   Patient/Family in Agreement with Plan yes

## 2019-03-10 NOTE — NURSING
"Situation Principle Problem:  Multiple closed fractures of ribs of left side      Reason for Calling: Pain    Provider Calling: Marlena Alegre    Background Vitals:    03/09/19 1708 03/09/19 1837 03/09/19 2023 03/09/19 2100   BP:  (!) 152/94     BP Location:  Right arm     Patient Position:  Sitting     Pulse: 109 (!) 111     Resp:  20     Temp:  98 °F (36.7 °C)     TempSrc:  Oral     SpO2: 96% (!) 94% 95%    Weight:       Height:    6' 3" (1.905 m)       POCT Glucose   Date Value Ref Range Status   03/09/2019 209 (H) 70 - 110 mg/dL Final     Past Medical History:   Diagnosis Date    Atrial fibrillation 2006    Cardioversion    Atrial fibrillation     Hypertension     Sleep apnea          Intake/Output:  No intake or output data in the 24 hours ending 03/10/19 0016     Assessment What is happening: Pt is on pain, has order for dilaudid 1 mg Q 4hr but he needs more medication and cant wait.    Response Provider Response:        "

## 2019-03-10 NOTE — PLAN OF CARE
03/10/19 1124   Patient Assessment/Suction   Level of Consciousness (AVPU) alert   PRE-TX-O2   O2 Device (Oxygen Therapy) room air   SpO2 96 %   Pulse Oximetry Type Intermittent   $ Pulse Oximetry - Multiple Charge Pulse Oximetry - Multiple   Pulse 104   Resp 16

## 2019-03-10 NOTE — H&P
[facility] Hospital Medicine  History & Physical    Patient Name: Bird Feng  MRN: [id number]  Admission Date: 3/9/2019  Attending Physician: Susan Toure MD   Primary Care Provider: Jared Villa MD         Patient information was obtained from patient, spouse/SO and ER records.     Subjective:     Principal Problem:Multiple closed fractures of ribs of left side    Chief Complaint:   Chief Complaint   Patient presents with    Fall     fell off of horse co lt shoulder pain        HPI: 54 y.o. male  has a past medical history of Atrial fibrillation (2006), Atrial fibrillation, Hypertension, and Sleep apnea who presents to the ED via EMS for evaluation of left shoulder pain. Pt reports being in his normal state of health until just prior to arrival when he began experiencing severe L shoulder pain after falling from horse. In the ED pt was treated with multiple doses of IV dilaudid before experiencing any relief. Imaging preformed in the ED showed multiple rib fractures. Given significant pain medication requirement Hospital medicine was consulted for admission.     Other than presenting compliant pt reports doing well. Denies prior complaints.     Past Medical History:   Diagnosis Date    Atrial fibrillation 2006    Cardioversion    Atrial fibrillation     Hypertension     Sleep apnea        Past Surgical History:   Procedure Laterality Date    CARDIOVERSION  2006    X2    REPAIR, HERNIA, UMBILICAL, AGE 5 YEARS OR OLDER N/A 11/19/2013    Performed by Ad[facility] Horne MD at [facility] OR    TONSILLECTOMY         Review of patient's allergies indicates:  No Known Allergies    No current facility-administered medications on file prior to encounter.      Current Outpatient Medications on File Prior to Encounter   Medication Sig    clonazePAM (KLONOPIN) 1 MG tablet Take 1 tablet (1 mg total) by mouth 2 (two) times daily as needed for Anxiety.    lisinopril 10 MG tablet Take 10 mg by mouth  once daily.     Family History     Problem Relation (Age of Onset)    Heart disease Father    Hypertension Mother, Father        Tobacco Use    Smoking status: Never Smoker    Smokeless tobacco: Never Used   Substance and Sexual Activity    Alcohol use: Yes     Alcohol/week: 2.5 oz     Types: 5 drink(s) per week     Comment: Socially    Drug use: No    Sexual activity: Not on file     Review of Systems   Constitutional: Negative for chills and fever.   HENT: Negative for congestion and sore throat.    Eyes: Negative for visual disturbance.   Respiratory: Positive for shortness of breath. Negative for cough.    Cardiovascular: Positive for chest pain (Pleuritic).   Gastrointestinal: Negative for abdominal distention, abdominal pain, constipation, diarrhea, nausea and vomiting.   Genitourinary: Negative for dysuria and hematuria.   Musculoskeletal: Positive for back pain.   Skin: Positive for rash.   Neurological: Negative for weakness, numbness and headaches.   Psychiatric/Behavioral: Negative for agitation and confusion.     Objective:     Vital Signs (Most Recent):  Temp: 97.3 °F (36.3 °C) (03/09/19 1425)  Pulse: 109 (03/09/19 1708)  Resp: (!) 22 (03/09/19 1425)  BP: (!) 161/88 (03/09/19 1538)  SpO2: 96 % (03/09/19 1708) Vital Signs (24h Range):  Temp:  [97.3 °F (36.3 °C)] 97.3 °F (36.3 °C)  Pulse:  [106-117] 109  Resp:  [22] 22  SpO2:  [93 %-96 %] 96 %  BP: (150-176)/() 161/88     Weight: (!) 167.8 kg (370 lb)  Body mass index is 46.25 kg/m².    Physical Exam   Constitutional: He is oriented to person, place, and time. He appears well-developed and well-nourished. No distress.   HENT:   Head: Normocephalic and atraumatic.   Eyes: EOM are normal. Pupils are equal, round, and reactive to light.   Neck: Normal range of motion. Neck supple.   Cardiovascular: Regular rhythm.   Tachy but regular   Pulmonary/Chest: Effort normal and breath sounds normal. He has no rales.   Abdominal: Soft. Bowel sounds are  normal. There is no tenderness.   Musculoskeletal: Normal range of motion.   Neurological: He is alert and oriented to person, place, and time.   Skin: Skin is warm and dry.   Excoriations on L lateral shoulder from fall         CRANIAL NERVES     CN III, IV, VI   Pupils are equal, round, and reactive to light.  Extraocular motions are normal.        Significant Labs:   CBC:   Recent Labs   Lab 03/09/19  1541   WBC 24.40*   HGB 14.5   HCT 44.1        CMP:   Recent Labs   Lab 03/09/19  1541      K 3.8      CO2 18*   *   BUN 15   CREATININE 1.1   CALCIUM 9.5   ANIONGAP 12   EGFRNONAA >60     Cardiac Markers: No results for input(s): CKMB, MYOGLOBIN, BNP, TROPISTAT in the last 48 hours.  TSH: No results for input(s): TSH in the last 4320 hours.  Urine Culture: No results for input(s): LABURIN in the last 48 hours.  Urine Studies: No results for input(s): COLORU, APPEARANCEUA, PHUR, SPECGRAV, PROTEINUA, GLUCUA, KETONESU, BILIRUBINUA, OCCULTUA, NITRITE, UROBILINOGEN, LEUKOCYTESUR, RBCUA, WBCUA, BACTERIA, SQUAMEPITHEL, HYALINECASTS in the last 48 hours.    Invalid input(s): WRIGHTSUR    Significant Imaging: I have reviewed and interpreted all pertinent imaging results/findings within the past 24 hours.     CT chest  Impression:       Acute fractures of the left 2nd through 8th ribs.  Edge to edge apposition of the lower ribs with bayonet shortening of the 5th rib.  There is  subcutaneous emphysema however a pneumothorax is not seen.  Three small bands of pulmonary contusion noted in the left upper lobe.  Other fractures are not identified.  Mediastinal hemorrhage or hematoma is not seen.     XR L Scapula  Impression:       Negative plain x-rays of the left scapula.  Acute fractures of the left 2nd 3rd 4th and 5th ribs with subcutaneous emphysema and possible pneumothorax.    Results were discussed with Dr. Wood and a CT of the chest will be obtained.     XR L Shoulder  Impression:       Acute  fractures of the left 2nd 3rd 4th and 5th ribs with a possible small left apical pneumothorax and a small amount of subcutaneous emphysema.  A fracture of the scapula or of the shoulder itself is not seen.  Dislocation is not noted.  A GREGORIO or on           Assessment/Plan:     * Multiple closed fractures of ribs of left side    2/2 fall from horse  Imaging + contusion, however -ve for PTX  Admit to observation for pain control  IS as tolerated     DAYANNA (generalized anxiety disorder)    Chronic problem  Continue home meds       Paroxysmal atrial fibrillation    S/p Ablation. Not on chronic anticoagulation therapy  Cardiac monitoring         Morbid obesity    Body mass index is 46.25 kg/m². Morbid obesity complicates all aspects of disease management from diagnostic modalities to treatment. Weight loss encouraged and health benefits explained to patient.         MANUELA on CPAP    Chronic issues  OK to use home CPAP         Leukocytosis  Etiology uncertain. ? 2/2 acute phase v infection v other  Denies c/o prior to presenting event. Chest imaging not suggestive of infection  Will check UA  Follow CBC in AM    VTE Risk Mitigation (From admission, onward)    None             Jaren Morgan MD  Department of Hospital Medicine   [facility]

## 2019-03-10 NOTE — ASSESSMENT & PLAN NOTE
2/2 fall from horse  Imaging + contusion, however -ve for PTX  Admit to observation for pain control  IS as tolerated

## 2019-03-10 NOTE — ASSESSMENT & PLAN NOTE
2/2 fall from horse  Imaging + contusion, however -ve for PTX  Pain not controlled with prn pain meds. Will schedule percocet with prn dilaudid for breakthrough  Wife asking for Ortho consult, re: eval for rib plating. Will comply   IS as tolerated

## 2019-03-10 NOTE — PLAN OF CARE
Problem: Adult Inpatient Plan of Care  Goal: Plan of Care Review  Outcome: Ongoing (interventions implemented as appropriate)   Pt was admitted by end of the previous shift. Alert and oriented x 4.  Plan of care reviewed with pt verbalized understanding. Pt on extreme pain and is not able to sleep on the bed, all night was sitting on the chair.pain medications periodically was administered  with effective result. Pt has order for Ac&HS but he is not aware that he is diabetic, BG monitored but Pr refused insulin. Pt will rather to take metformin, so he will talk to  tomorrow. Safety maintained.

## 2019-03-10 NOTE — PROGRESS NOTES
[facility] Medicine  Progress Note    Patient Name: Bird Feng  MRN: [id number]  Patient Class: IP- Inpatient   Admission Date: 3/9/2019  Length of Stay: 1 days  Attending Physician: Susan Toure MD  Primary Care Provider: Jared Villa MD        Subjective:     Principal Problem:Multiple closed fractures of ribs of left side    HPI:  54 y.o. male  has a past medical history of Atrial fibrillation (2006), Atrial fibrillation, Hypertension, and Sleep apnea who presents to the ED via EMS for evaluation of left shoulder pain. Pt reports being in his normal state of health until just prior to arrival when he began experiencing severe L shoulder pain after falling from horse. In the ED pt was treated with multiple doses of IV dilaudid before experiencing any relief. Imaging preformed in the ED showed multiple rib fractures. Given significant pain medication requirement Hospital medicine was consulted for admission.     Other than presenting compliant pt reports doing well. Denies prior complaints.     Hospital Course:  No notes on file    Interval History: Minimal improvement in pain overnight. + Pleuritic CP. Denies fever/chills    Review of Systems   Constitutional: Negative for chills and fever.   HENT: Negative for congestion and sore throat.    Eyes: Negative for visual disturbance.   Respiratory: Positive for shortness of breath. Negative for cough.    Cardiovascular: Positive for chest pain (Pleuritic).   Gastrointestinal: Negative for abdominal distention, abdominal pain, constipation, diarrhea, nausea and vomiting.   Genitourinary: Negative for dysuria and hematuria.   Musculoskeletal: Positive for back pain.   Skin: Positive for rash.   Neurological: Negative for weakness, numbness and headaches.   Psychiatric/Behavioral: Negative for agitation and confusion.     Objective:     Vital Signs (Most Recent):  Temp: 97.8 °F (36.6 °C) (03/10/19 0758)  Pulse: 95 (03/10/19 0758)  Resp:  18 (03/10/19 0758)  BP: (!) 146/91 (03/10/19 0758)  SpO2: (!) 91 % (03/10/19 0758) Vital Signs (24h Range):  Temp:  [97.3 °F (36.3 °C)-98 °F (36.7 °C)] 97.8 °F (36.6 °C)  Pulse:  [] 95  Resp:  [18-22] 18  SpO2:  [91 %-96 %] 91 %  BP: (146-176)/() 146/91     Weight: (!) 167.8 kg (370 lb)  Body mass index is 46.25 kg/m².    Intake/Output Summary (Last 24 hours) at 3/10/2019 1331  Last data filed at 3/10/2019 0600  Gross per 24 hour   Intake 1225 ml   Output 400 ml   Net 825 ml      Physical Exam   Constitutional: He is oriented to person, place, and time. He appears well-developed and well-nourished. No distress.   HENT:   Head: Normocephalic and atraumatic.   Eyes: EOM are normal. Pupils are equal, round, and reactive to light.   Neck: Normal range of motion. Neck supple.   Cardiovascular: Regular rhythm.   Tachy but regular   Pulmonary/Chest: Effort normal and breath sounds normal. He has no rales.   Abdominal: Soft. Bowel sounds are normal. There is no tenderness.   Musculoskeletal: Normal range of motion.   Neurological: He is alert and oriented to person, place, and time.   Skin: Skin is warm and dry.   Excoriations on L lateral shoulder from fall       Significant Labs: All pertinent labs within the past 24 hours have been reviewed.    Significant Imaging: I have reviewed and interpreted all pertinent imaging results/findings within the past 24 hours.    Assessment/Plan:      * Multiple closed fractures of ribs of left side    2/2 fall from horse  Imaging + contusion, however -ve for PTX  Pain not controlled with prn pain meds. Will schedule percocet with prn dilaudid for breakthrough  Wife asking for Ortho consult, re: eval for rib plating. Will comply   IS as tolerated     DAYANNA (generalized anxiety disorder)    Chronic problem, with acute decompensation  Increase clonopin to TID prn       Paroxysmal atrial fibrillation    S/p Ablation. Not on chronic anticoagulation therapy  Cardiac monitoring          Morbid obesity    Body mass index is 46.25 kg/m². Morbid obesity complicates all aspects of disease management from diagnostic modalities to treatment. Weight loss encouraged and health benefits explained to patient.         MANUELA on CPAP    Chronic issues  OK to use home CPAP         VTE Risk Mitigation (From admission, onward)        Ordered     enoxaparin injection 40 mg  Daily      03/09/19 1830     IP VTE HIGH RISK PATIENT  Once      03/09/19 1830              Jaren Morgan MD  Department of Hospital Medicine   [facility]

## 2019-03-10 NOTE — PLAN OF CARE
Problem: Adult Inpatient Plan of Care  Goal: Plan of Care Review  Outcome: Ongoing (interventions implemented as appropriate)  Plan of care reviewed with patient he verbalized understanding cont on a pain management and also medicated for anxiety. Pt is up in chair on bipap brought from home. Will cont to monitor closely. Call light within reach will cont to monitor. Blood glucose monitoring no ssc needed.

## 2019-03-10 NOTE — SUBJECTIVE & OBJECTIVE
Interval History: Minimal improvement in pain overnight. + Pleuritic CP. Denies fever/chills    Review of Systems   Constitutional: Negative for chills and fever.   HENT: Negative for congestion and sore throat.    Eyes: Negative for visual disturbance.   Respiratory: Positive for shortness of breath. Negative for cough.    Cardiovascular: Positive for chest pain (Pleuritic).   Gastrointestinal: Negative for abdominal distention, abdominal pain, constipation, diarrhea, nausea and vomiting.   Genitourinary: Negative for dysuria and hematuria.   Musculoskeletal: Positive for back pain.   Skin: Positive for rash.   Neurological: Negative for weakness, numbness and headaches.   Psychiatric/Behavioral: Negative for agitation and confusion.     Objective:     Vital Signs (Most Recent):  Temp: 97.8 °F (36.6 °C) (03/10/19 0758)  Pulse: 95 (03/10/19 0758)  Resp: 18 (03/10/19 0758)  BP: (!) 146/91 (03/10/19 0758)  SpO2: (!) 91 % (03/10/19 0758) Vital Signs (24h Range):  Temp:  [97.3 °F (36.3 °C)-98 °F (36.7 °C)] 97.8 °F (36.6 °C)  Pulse:  [] 95  Resp:  [18-22] 18  SpO2:  [91 %-96 %] 91 %  BP: (146-176)/() 146/91     Weight: (!) 167.8 kg (370 lb)  Body mass index is 46.25 kg/m².    Intake/Output Summary (Last 24 hours) at 3/10/2019 1331  Last data filed at 3/10/2019 0600  Gross per 24 hour   Intake 1225 ml   Output 400 ml   Net 825 ml      Physical Exam   Constitutional: He is oriented to person, place, and time. He appears well-developed and well-nourished. No distress.   HENT:   Head: Normocephalic and atraumatic.   Eyes: EOM are normal. Pupils are equal, round, and reactive to light.   Neck: Normal range of motion. Neck supple.   Cardiovascular: Regular rhythm.   Tachy but regular   Pulmonary/Chest: Effort normal and breath sounds normal. He has no rales.   Abdominal: Soft. Bowel sounds are normal. There is no tenderness.   Musculoskeletal: Normal range of motion.   Neurological: He is alert and oriented to  person, place, and time.   Skin: Skin is warm and dry.   Excoriations on L lateral shoulder from fall       Significant Labs: All pertinent labs within the past 24 hours have been reviewed.    Significant Imaging: I have reviewed and interpreted all pertinent imaging results/findings within the past 24 hours.

## 2019-03-11 LAB
ANION GAP SERPL CALC-SCNC: 8 MMOL/L
BUN SERPL-MCNC: 31 MG/DL
CALCIUM SERPL-MCNC: 8.9 MG/DL
CHLORIDE SERPL-SCNC: 104 MMOL/L
CO2 SERPL-SCNC: 23 MMOL/L
CREAT SERPL-MCNC: 1.3 MG/DL
EST. GFR  (AFRICAN AMERICAN): >60 ML/MIN/1.73 M^2
EST. GFR  (NON AFRICAN AMERICAN): >60 ML/MIN/1.73 M^2
GLUCOSE SERPL-MCNC: 138 MG/DL
POCT GLUCOSE: 140 MG/DL (ref 70–110)
POTASSIUM SERPL-SCNC: 4.2 MMOL/L
SODIUM SERPL-SCNC: 135 MMOL/L

## 2019-03-11 PROCEDURE — 80048 BASIC METABOLIC PNL TOTAL CA: CPT

## 2019-03-11 PROCEDURE — 94761 N-INVAS EAR/PLS OXIMETRY MLT: CPT

## 2019-03-11 PROCEDURE — 94799 UNLISTED PULMONARY SVC/PX: CPT

## 2019-03-11 PROCEDURE — 25000003 PHARM REV CODE 250: Performed by: INTERNAL MEDICINE

## 2019-03-11 PROCEDURE — 36415 COLL VENOUS BLD VENIPUNCTURE: CPT

## 2019-03-11 PROCEDURE — 12000002 HC ACUTE/MED SURGE SEMI-PRIVATE ROOM

## 2019-03-11 PROCEDURE — 63600175 PHARM REV CODE 636 W HCPCS: Performed by: INTERNAL MEDICINE

## 2019-03-11 RX ORDER — LIDOCAINE 50 MG/G
1 PATCH TOPICAL DAILY
Status: DISCONTINUED | OUTPATIENT
Start: 2019-03-11 | End: 2019-03-20 | Stop reason: HOSPADM

## 2019-03-11 RX ADMIN — CLONAZEPAM 1 MG: 1 TABLET ORAL at 07:03

## 2019-03-11 RX ADMIN — OXYCODONE HYDROCHLORIDE AND ACETAMINOPHEN 1 TABLET: 10; 325 TABLET ORAL at 07:03

## 2019-03-11 RX ADMIN — OXYCODONE HYDROCHLORIDE AND ACETAMINOPHEN 1 TABLET: 10; 325 TABLET ORAL at 05:03

## 2019-03-11 RX ADMIN — OXYCODONE HYDROCHLORIDE AND ACETAMINOPHEN 1 TABLET: 10; 325 TABLET ORAL at 02:03

## 2019-03-11 RX ADMIN — SENNOSIDES AND DOCUSATE SODIUM 1 TABLET: 8.6; 5 TABLET ORAL at 08:03

## 2019-03-11 RX ADMIN — OXYCODONE HYDROCHLORIDE AND ACETAMINOPHEN 1 TABLET: 10; 325 TABLET ORAL at 11:03

## 2019-03-11 RX ADMIN — OXYCODONE HYDROCHLORIDE AND ACETAMINOPHEN 1 TABLET: 10; 325 TABLET ORAL at 09:03

## 2019-03-11 RX ADMIN — LISINOPRIL 10 MG: 10 TABLET ORAL at 08:03

## 2019-03-11 RX ADMIN — HYDROMORPHONE HYDROCHLORIDE 1 MG: 2 INJECTION, SOLUTION INTRAMUSCULAR; INTRAVENOUS; SUBCUTANEOUS at 08:03

## 2019-03-11 RX ADMIN — HYDROMORPHONE HYDROCHLORIDE 1 MG: 2 INJECTION, SOLUTION INTRAMUSCULAR; INTRAVENOUS; SUBCUTANEOUS at 04:03

## 2019-03-11 RX ADMIN — PROMETHAZINE HYDROCHLORIDE 6.25 MG: 25 INJECTION INTRAMUSCULAR; INTRAVENOUS at 05:03

## 2019-03-11 RX ADMIN — CLONAZEPAM 1 MG: 1 TABLET ORAL at 11:03

## 2019-03-11 RX ADMIN — HYDROMORPHONE HYDROCHLORIDE 1 MG: 2 INJECTION, SOLUTION INTRAMUSCULAR; INTRAVENOUS; SUBCUTANEOUS at 12:03

## 2019-03-11 RX ADMIN — CLONAZEPAM 1 MG: 1 TABLET ORAL at 02:03

## 2019-03-11 RX ADMIN — ONDANSETRON 4 MG: 2 INJECTION, SOLUTION INTRAMUSCULAR; INTRAVENOUS at 02:03

## 2019-03-11 RX ADMIN — ENOXAPARIN SODIUM 40 MG: 100 INJECTION SUBCUTANEOUS at 04:03

## 2019-03-11 RX ADMIN — SENNOSIDES AND DOCUSATE SODIUM 1 TABLET: 8.6; 5 TABLET ORAL at 11:03

## 2019-03-11 RX ADMIN — LIDOCAINE 1 PATCH: 50 PATCH TOPICAL at 04:03

## 2019-03-11 NOTE — PLAN OF CARE
03/10/19 2008   Patient Assessment/Suction   Level of Consciousness (AVPU) alert   Respiratory Effort Unlabored   PRE-TX-O2   O2 Device (Oxygen Therapy) (home cpap)   SpO2 (!) 93 %   Pulse Oximetry Type Intermittent   $ Pulse Oximetry - Multiple Charge Pulse Oximetry - Multiple   Pulse 87   Incentive Spirometer   $ Incentive Spirometer Charges refused  (he wanted pain med and leave him alone with the IS)

## 2019-03-11 NOTE — PROGRESS NOTES
Progress Note  Hospital Medicine  Patient Name:Bird Feng  MRN:  [id number]  Patient Class: IP- Inpatient  Admit Date: 3/9/2019  Length of Stay: 2 days  Expected Discharge Date:   Attending Physician: Susan Toure MD  Primary Care Provider:  Jared Villa MD    SUBJECTIVE:     Principal Problem: Multiple closed fractures of ribs of left side  Initial history of present illness: 54 y.o. male  has a past medical history of Atrial fibrillation (2006), Atrial fibrillation, Hypertension, and Sleep apnea who presents to the ED via EMS for evaluation of left shoulder pain. Pt reports being in his normal state of health until just prior to arrival when he began experiencing severe L shoulder pain after falling from horse. In the ED pt was treated with multiple doses of IV dilaudid before experiencing any relief. Imaging preformed in the ED showed multiple rib fractures. Given significant pain medication requirement Hospital medicine was consulted for admission.     PMH/PSH/SH/FH/Meds: reviewed.    Symptoms/Review of Systems:  Patient appears in distress due to left chest wall pain related to multiple rib fractures. No shortness of breath, cough, or headache, fever or abdominal pain. Patient examined along with Dr. Garrett.  Family member at bedside.  Diet:  Adequate intake.    Activity level: Normal.    Pain:  Left chest wall pain    OBJECTIVE:   Vital Signs (Most Recent):      Temp: 97.7 °F (36.5 °C) (03/11/19 0010)  Pulse: 93 (03/11/19 0010)  Resp: 20 (03/11/19 0010)  BP: (!) 164/73 (03/11/19 0010)  SpO2: 95 % (03/11/19 0010)       Vital Signs Range (Last 24H):  Temp:  [97.7 °F (36.5 °C)-98.9 °F (37.2 °C)]   Pulse:  []   Resp:  [16-20]   BP: (128-164)/(65-73)   SpO2:  [91 %-96 %]     Weight: (!) 167.8 kg (370 lb)  Body mass index is 46.25 kg/m².    Intake/Output Summary (Last 24 hours) at 3/11/2019 0905  Last data filed at 3/11/2019 0600  Gross per 24 hour   Intake 2100 ml   Output 500 ml   Net 1600 ml      Physical Examination:  Constitutional: He is oriented to person, place, and time. He appears well-developed and well-nourished. No distress.   HENT:   Head: Normocephalic and atraumatic.   Eyes: EOM are normal. Pupils are equal, round, and reactive to light.   Neck: Normal range of motion. Neck supple.   Cardiovascular: Regular rhythm.   Pulmonary/Chest: Effort normal and breath sounds normal. He has no rales.  Left posterior chest wall tender, no obvious crepitus felt.  Abdominal: Soft. Bowel sounds are normal. There is no tenderness.   Musculoskeletal: Normal range of motion.   Neurological: He is alert and oriented to person, place, and time.   Skin: Skin is warm and dry.   Excoriations on L lateral shoulder from fall     CBC:  Recent Labs   Lab 03/09/19  1541 03/10/19  0609   WBC 24.40* 17.30*   RBC 5.03 5.02   HGB 14.5 14.6   HCT 44.1 44.3    301   MCV 88 88   MCH 28.8 29.1   MCHC 32.9 33.0   BMP  Recent Labs   Lab 03/09/19  1541 03/10/19  0609 03/11/19  0620   * 172* 138*    138 135*   K 3.8 4.3 4.2    108 104   CO2 18* 17* 23   BUN 15 18 31*   CREATININE 1.1 1.1 1.3   CALCIUM 9.5 9.4 8.9      Diagnostic Results:  Microbiology Results (last 7 days)     ** No results found for the last 168 hours. **         CT scan of the chest without contrast:  Acute fractures of the left 2nd through 8th ribs.  Edge to edge apposition of the lower ribs with bayonet shortening of the 5th rib.  There is  subcutaneous emphysema however a pneumothorax is not seen.  Three small bands of pulmonary contusion noted in the left upper lobe.  Other fractures are not identified.  Mediastinal hemorrhage or hematoma is not seen.    Left scapula x-ray:  Negative plain x-rays of the left scapula.  Acute fractures of the left 2nd 3rd 4th and 5th ribs with subcutaneous emphysema and possible pneumothorax.    Left shoulder x-ray:  Acute fractures of the left 2nd 3rd 4th and 5th ribs with a possible small left  apical pneumothorax and a small amount of subcutaneous emphysema.  A fracture of the scapula or of the shoulder itself is not seen.  Dislocation is not noted.     Assessment/Plan:     * Multiple closed fractures of ribs of left side  Pulmonary contusion     2/2 fall from horse  Imaging + contusion, however -ve for PTX  Pain not controlled with prn pain meds.  Continue percocet with prn dilaudid for breakthrough  Orthopedic service is being consulted for evaluation of the rib plating.  IS as tolerated      DAYANNA (generalized anxiety disorder)     Chronic problem, with acute decompensation  Increase clonopin to TID prn     Paroxysmal atrial fibrillation     S/p Ablation. Not on chronic anticoagulation therapy  Cardiac monitoring     Morbid obesity     Body mass index is 46.25 kg/m². Morbid obesity complicates all aspects of disease management from diagnostic modalities to treatment. Weight loss encouraged and health benefits explained to patient.      MANUELA on CPAP     Chronic issues  OK to use home CPAP.      VTE Risk Mitigation (From admission, onward)        Ordered     enoxaparin injection 40 mg  Daily      03/09/19 1830     IP VTE HIGH RISK PATIENT  Once      03/09/19 1830        Susan Toure MD  Department of Hospital Medicine   [facility]

## 2019-03-11 NOTE — PLAN OF CARE
Problem: Adult Inpatient Plan of Care  Goal: Plan of Care Review  Outcome: Ongoing (interventions implemented as appropriate)  Plan of care reviewed with patient. Patient verbalized complete understanding. PRN and scheduled pain meds administered on schedule. BG monitored throughout shift. Ambulated 90 feet with walker. Dr. Garrett consulted, and CXR ordered. All fall precautions maintained. Bed in lowest position, locked, call light within reach. Side rails up x's 2. Slip resistant socks maintained.

## 2019-03-11 NOTE — PLAN OF CARE
03/11/19 0845   PRE-TX-O2   O2 Device (Oxygen Therapy) room air   SpO2 (!) 93 %   Pulse Oximetry Type Intermittent   $ Pulse Oximetry - Multiple Charge Pulse Oximetry - Multiple   Incentive Spirometer   $ Incentive Spirometer Charges done with encouragement   Administration (IS) proper technique demonstrated   Number of Repetitions (IS) 10   Level Incentive Spirometer (mL) 1500   Patient Tolerance (IS) good

## 2019-03-11 NOTE — PROGRESS NOTES
Spoke with Dr Alexander regarding consult.  States he spoke with nurse yesterday and he is not able to fulfill this consult--possibly a cardiovascular surgeon or spine surgeon.  Dr Toure notified. Orders for Dr Garrett given.

## 2019-03-11 NOTE — NURSING
Situation Principle Problem:  Multiple closed fractures of ribs of left side      Reason for Calling: Pt feels gas     Provider Calling:   Background Vitals:    03/10/19 1124 03/10/19 1548 03/10/19 2002 03/10/19 2008   BP:  128/66 129/65    BP Location:   Left arm    Patient Position:   Lying    Pulse: 104 90 86 87   Resp: 16 16 16    Temp:  98.7 °F (37.1 °C) 98.9 °F (37.2 °C)    TempSrc:   Axillary    SpO2: 96% 95% (!) 91% (!) 93%   Weight:       Height:           POCT Glucose   Date Value Ref Range Status   03/10/2019 147 (H) 70 - 110 mg/dL Final   03/10/2019 126 (H) 70 - 110 mg/dL Final   03/10/2019 162 (H) 70 - 110 mg/dL Final   03/10/2019 148 (H) 70 - 110 mg/dL Final   03/09/2019 209 (H) 70 - 110 mg/dL Final     Past Medical History:   Diagnosis Date    Atrial fibrillation 2006    Cardioversion    Atrial fibrillation     Hypertension     Sleep apnea          Intake/Output:    Intake/Output Summary (Last 24 hours) at 3/10/2019 2201  Last data filed at 3/10/2019 1800  Gross per 24 hour   Intake 3300 ml   Output 400 ml   Net 2900 ml        Assessment What is happening: Pt compline of being gassy    Response Provider Response: Aluminium, magnesium, simeticon was prescribed.

## 2019-03-11 NOTE — PLAN OF CARE
Problem: Adult Inpatient Plan of Care  Goal: Plan of Care Review  Outcome: Ongoing (interventions implemented as appropriate)  Plan of care reviewed with pt/ wife , both verbalized understanding. Tonight Pt was able to walk in the hallway for few minutes, also he was able to lay in the bed for 2 hours but for rest of the shift he was sitting on the chair.  Hourly and Q2h rounds completed on this pt throughout shift.  Pain medication  adminstered around the cock to control the pain.  Call light kept within reach, bed in locked and lowest position, side rails up x2.  Needs attended to, will continue to monitor and update as needed.

## 2019-03-11 NOTE — CONSULTS
Consult Note  Cardiothoracic Surgery    Consults  SUBJECTIVE:     History of Present Illness:  Patient is a 54 y.o. male presents following fall off horse resulting in multiple L rib fractures. Patient experiencing severe pain. Referred to consider rib stabilization    Scheduled Meds:   enoxaparin  40 mg Subcutaneous Daily    lisinopril  10 mg Oral Daily    oxyCODONE-acetaminophen  1 tablet Oral Q4H    senna-docusate 8.6-50 mg  1 tablet Oral BID     Infusions/Drips:  PRN Meds:acetaminophen, aluminum-magnesium hydroxide-simethicone, clonazePAM, dextrose 50%, dextrose 50%, glucagon (human recombinant), glucose, glucose, HYDROmorphone, insulin aspart U-100, magnesium oxide, magnesium oxide, ondansetron, potassium chloride 10%, potassium chloride 10%, promethazine (PHENERGAN) IVPB, sodium chloride 0.9%    Review of patient's allergies indicates:  No Known Allergies    Past Medical History:   Diagnosis Date    Atrial fibrillation 2006    Cardioversion    Atrial fibrillation     Hypertension     Sleep apnea      Past Surgical History:   Procedure Laterality Date    CARDIOVERSION  2006    X2    REPAIR, HERNIA, UMBILICAL, AGE 4 YEARS OR OLDER N/A 11/19/2013    Performed by Ad[facility] Horne [facility] at [facility] OR    TONSILLECTOMY       Family History   Problem Relation Age of Onset    Hypertension Mother     Hypertension Father     Heart disease Father      Social History     Tobacco Use    Smoking status: Never Smoker    Smokeless tobacco: Never Used   Substance Use Topics    Alcohol use: Yes     Alcohol/week: 2.5 oz     Types: 5 drink(s) per week     Comment: Socially    Drug use: No        Review of Systems:      OBJECTIVE:     Vital Signs (Most Recent)  Temp: 98.2 °F (36.8 °C) (03/11/19 1125)  Pulse: 80 (03/11/19 1125)  Resp: 16 (03/11/19 1125)  BP: (!) 144/67 (03/11/19 1125)  SpO2: (!) 92 % (03/11/19 1125)    Admission Weight: (!) 167.8 kg (370 lb) (03/09/19 1425)   Most Recent Weight: (!) 167.8 kg (370 lb)  (03/09/19 7735)    Vital Signs Range (Last 24H):  Temp:  [97.7 °F (36.5 °C)-98.9 °F (37.2 °C)]   Pulse:  [80-93]   Resp:  [16-20]   BP: (128-164)/(65-73)   SpO2:  [91 %-95 %]     Physical Exam:  Appears uncomfortable, moves very slowly secondary to the pain  BS  CV RRR  Abd soft  Ext mild edema    Laboratory:  CBC:   Recent Labs   Lab 03/10/19  0609   WBC 17.30*   RBC 5.02   HGB 14.6   HCT 44.3      MCV 88   MCH 29.1   MCHC 33.0     BMP:   Recent Labs   Lab 03/11/19  0620   *   *   K 4.2      CO2 23   BUN 31*   CREATININE 1.3   CALCIUM 8.9     Coagulation: No results for input(s): LABPROT, INR, APTT in the last 168 hours.    Diagnostic Results:  Admission films reviewed    ASSESSMENT/PLAN:     Impression :Blunt chest trauma, multiple L rib fractures    Recommendation : Appears to be candidate for rib plating to re align and stabilize the fractures and relieve pain. Discussed with Dr Toure. Will need to obtain rib views to evaluate further    Thank you for the consult

## 2019-03-12 LAB
ANION GAP SERPL CALC-SCNC: 10 MMOL/L
BASOPHILS # BLD AUTO: 0 K/UL
BASOPHILS NFR BLD: 0.4 %
BUN SERPL-MCNC: 20 MG/DL
CALCIUM SERPL-MCNC: 9 MG/DL
CHLORIDE SERPL-SCNC: 102 MMOL/L
CO2 SERPL-SCNC: 23 MMOL/L
CREAT SERPL-MCNC: 0.9 MG/DL
DIFFERENTIAL METHOD: ABNORMAL
EOSINOPHIL # BLD AUTO: 0.1 K/UL
EOSINOPHIL NFR BLD: 0.9 %
ERYTHROCYTE [DISTWIDTH] IN BLOOD BY AUTOMATED COUNT: 15.6 %
EST. GFR  (AFRICAN AMERICAN): >60 ML/MIN/1.73 M^2
EST. GFR  (NON AFRICAN AMERICAN): >60 ML/MIN/1.73 M^2
GLUCOSE SERPL-MCNC: 123 MG/DL
HCT VFR BLD AUTO: 38.7 %
HGB BLD-MCNC: 12.7 G/DL
LYMPHOCYTES # BLD AUTO: 1.4 K/UL
LYMPHOCYTES NFR BLD: 14.8 %
MCH RBC QN AUTO: 29.3 PG
MCHC RBC AUTO-ENTMCNC: 33 G/DL
MCV RBC AUTO: 89 FL
MONOCYTES # BLD AUTO: 1 K/UL
MONOCYTES NFR BLD: 10.9 %
NEUTROPHILS # BLD AUTO: 6.8 K/UL
NEUTROPHILS NFR BLD: 73 %
PLATELET # BLD AUTO: 240 K/UL
PMV BLD AUTO: 7.8 FL
POCT GLUCOSE: 148 MG/DL (ref 70–110)
POCT GLUCOSE: 149 MG/DL (ref 70–110)
POTASSIUM SERPL-SCNC: 4.2 MMOL/L
RBC # BLD AUTO: 4.36 M/UL
SODIUM SERPL-SCNC: 135 MMOL/L
WBC # BLD AUTO: 9.3 K/UL

## 2019-03-12 PROCEDURE — 63600175 PHARM REV CODE 636 W HCPCS: Performed by: INTERNAL MEDICINE

## 2019-03-12 PROCEDURE — 25000003 PHARM REV CODE 250: Performed by: INTERNAL MEDICINE

## 2019-03-12 PROCEDURE — 36415 COLL VENOUS BLD VENIPUNCTURE: CPT

## 2019-03-12 PROCEDURE — 80048 BASIC METABOLIC PNL TOTAL CA: CPT

## 2019-03-12 PROCEDURE — 85025 COMPLETE CBC W/AUTO DIFF WBC: CPT

## 2019-03-12 PROCEDURE — 12000002 HC ACUTE/MED SURGE SEMI-PRIVATE ROOM

## 2019-03-12 PROCEDURE — 94799 UNLISTED PULMONARY SVC/PX: CPT

## 2019-03-12 PROCEDURE — 94761 N-INVAS EAR/PLS OXIMETRY MLT: CPT

## 2019-03-12 RX ORDER — LISINOPRIL 10 MG/1
20 TABLET ORAL DAILY
Status: DISCONTINUED | OUTPATIENT
Start: 2019-03-13 | End: 2019-03-17

## 2019-03-12 RX ORDER — DIPHENHYDRAMINE HYDROCHLORIDE 50 MG/ML
12.5 INJECTION INTRAMUSCULAR; INTRAVENOUS EVERY 6 HOURS PRN
Status: DISCONTINUED | OUTPATIENT
Start: 2019-03-12 | End: 2019-03-20 | Stop reason: HOSPADM

## 2019-03-12 RX ORDER — HYDRALAZINE HYDROCHLORIDE 20 MG/ML
10 INJECTION INTRAMUSCULAR; INTRAVENOUS
Status: DISCONTINUED | OUTPATIENT
Start: 2019-03-12 | End: 2019-03-20 | Stop reason: HOSPADM

## 2019-03-12 RX ADMIN — HYDROMORPHONE HYDROCHLORIDE 1 MG: 2 INJECTION, SOLUTION INTRAMUSCULAR; INTRAVENOUS; SUBCUTANEOUS at 04:03

## 2019-03-12 RX ADMIN — LIDOCAINE 1 PATCH: 50 PATCH TOPICAL at 09:03

## 2019-03-12 RX ADMIN — CLONAZEPAM 1 MG: 1 TABLET ORAL at 11:03

## 2019-03-12 RX ADMIN — OXYCODONE HYDROCHLORIDE AND ACETAMINOPHEN 1 TABLET: 10; 325 TABLET ORAL at 09:03

## 2019-03-12 RX ADMIN — DIPHENHYDRAMINE HYDROCHLORIDE 12.5 MG: 50 INJECTION, SOLUTION INTRAMUSCULAR; INTRAVENOUS at 04:03

## 2019-03-12 RX ADMIN — HYDROMORPHONE HYDROCHLORIDE 1 MG: 2 INJECTION, SOLUTION INTRAMUSCULAR; INTRAVENOUS; SUBCUTANEOUS at 12:03

## 2019-03-12 RX ADMIN — LISINOPRIL 10 MG: 10 TABLET ORAL at 09:03

## 2019-03-12 RX ADMIN — OXYCODONE HYDROCHLORIDE AND ACETAMINOPHEN 1 TABLET: 10; 325 TABLET ORAL at 05:03

## 2019-03-12 RX ADMIN — SENNOSIDES AND DOCUSATE SODIUM 1 TABLET: 8.6; 5 TABLET ORAL at 09:03

## 2019-03-12 RX ADMIN — HYDROMORPHONE HYDROCHLORIDE 1 MG: 2 INJECTION, SOLUTION INTRAMUSCULAR; INTRAVENOUS; SUBCUTANEOUS at 07:03

## 2019-03-12 RX ADMIN — HYDROMORPHONE HYDROCHLORIDE 1 MG: 2 INJECTION, SOLUTION INTRAMUSCULAR; INTRAVENOUS; SUBCUTANEOUS at 08:03

## 2019-03-12 RX ADMIN — OXYCODONE HYDROCHLORIDE AND ACETAMINOPHEN 1 TABLET: 10; 325 TABLET ORAL at 01:03

## 2019-03-12 RX ADMIN — OXYCODONE HYDROCHLORIDE AND ACETAMINOPHEN 1 TABLET: 10; 325 TABLET ORAL at 10:03

## 2019-03-12 RX ADMIN — CLONAZEPAM 1 MG: 1 TABLET ORAL at 08:03

## 2019-03-12 RX ADMIN — OXYCODONE HYDROCHLORIDE AND ACETAMINOPHEN 1 TABLET: 10; 325 TABLET ORAL at 02:03

## 2019-03-12 RX ADMIN — ENOXAPARIN SODIUM 40 MG: 100 INJECTION SUBCUTANEOUS at 04:03

## 2019-03-12 RX ADMIN — CLONAZEPAM 1 MG: 1 TABLET ORAL at 07:03

## 2019-03-12 RX ADMIN — SENNOSIDES AND DOCUSATE SODIUM 1 TABLET: 8.6; 5 TABLET ORAL at 08:03

## 2019-03-12 NOTE — PLAN OF CARE
Problem: Adult Inpatient Plan of Care  Goal: Plan of Care Review  Outcome: Ongoing (interventions implemented as appropriate)  Plan of care reviewed with patient. Patient verbalized complete understanding. PRN pain administered as needed. Patient ambulated in hallway. Anticipating repair of L. Rib fractures Thursday 3-14 w/Dr. Garrett. All fall precautions maintained. Bed in lowest position, locked, call light within reach. Side rails up x's 2. Slip resistant socks maintained.

## 2019-03-12 NOTE — PROGRESS NOTES
Progress Note  Hospital Medicine  Patient Name:Bird Feng  MRN:  [id number]  Patient Class: IP- Inpatient  Admit Date: 3/9/2019  Length of Stay: 3 days  Expected Discharge Date:   Attending Physician: Susan Toure MD  Primary Care Provider:  Jared Villa MD    SUBJECTIVE:     Principal Problem: Multiple closed fractures of ribs of left side  Initial history of present illness: 54 y.o. male  has a past medical history of Atrial fibrillation (2006), Atrial fibrillation, Hypertension, and Sleep apnea who presents to the ED via EMS for evaluation of left shoulder pain. Pt reports being in his normal state of health until just prior to arrival when he began experiencing severe L shoulder pain after falling from horse. In the ED pt was treated with multiple doses of IV dilaudid before experiencing any relief. Imaging preformed in the ED showed multiple rib fractures. Given significant pain medication requirement Hospital medicine was consulted for admission.     PMH/PSH/SH/FH/Meds: reviewed.    Symptoms/Review of Systems:  Patient reports slight improvement in left chest wall, breathing better, family members at bedside. Blood pressure continues to be high, possibly related to pain issues. No shortness of breath, cough, or headache, fever or abdominal pain.  Diet:  Adequate intake.    Activity level: Normal.    Pain:  Left chest wall pain    OBJECTIVE:   Vital Signs (Most Recent):      Temp: 98.6 °F (37 °C) (03/11/19 1951)  Pulse: 109 (03/11/19 1952)  Resp: 18 (03/11/19 1951)  BP: (!) 181/101 (03/11/19 1958)  SpO2: 95 % (03/11/19 2000)       Vital Signs Range (Last 24H):  Temp:  [98.2 °F (36.8 °C)-98.6 °F (37 °C)]   Pulse:  []   Resp:  [16-18]   BP: (144-234)/()   SpO2:  [92 %-95 %]     Weight: (!) 167.8 kg (370 lb)  Body mass index is 46.25 kg/m².    Intake/Output Summary (Last 24 hours) at 3/12/2019 0932  Last data filed at 3/12/2019 0600  Gross per 24 hour   Intake --   Output 700 ml   Net -700 ml      Physical Examination:  Constitutional: He is oriented to person, place, and time. He appears well-developed and well-nourished. No distress.   HENT:   Head: Normocephalic and atraumatic.   Eyes: EOM are normal. Pupils are equal, round, and reactive to light.   Neck: Normal range of motion. Neck supple.   Cardiovascular: Regular rhythm.   Pulmonary/Chest: Effort normal and breath sounds normal. He has no rales.  Left posterior chest wall tender, no obvious crepitus felt.  Abdominal: Soft. Bowel sounds are normal. There is no tenderness.   Musculoskeletal: Normal range of motion.   Neurological: He is alert and oriented to person, place, and time.   Skin: Skin is warm and dry.   Excoriations on L lateral shoulder from fall     CBC:  Recent Labs   Lab 03/09/19  1541 03/10/19  0609 03/12/19  0643   WBC 24.40* 17.30* 9.30   RBC 5.03 5.02 4.36*   HGB 14.5 14.6 12.7*   HCT 44.1 44.3 38.7*    301 240   MCV 88 88 89   MCH 28.8 29.1 29.3   MCHC 32.9 33.0 33.0   BMP  Recent Labs   Lab 03/10/19  0609 03/11/19  0620 03/12/19  0643   * 138* 123*    135* 135*   K 4.3 4.2 4.2    104 102   CO2 17* 23 23   BUN 18 31* 20   CREATININE 1.1 1.3 0.9   CALCIUM 9.4 8.9 9.0      Diagnostic Results:  Microbiology Results (last 7 days)     ** No results found for the last 168 hours. **         CT scan of the chest without contrast:  Acute fractures of the left 2nd through 8th ribs.  Edge to edge apposition of the lower ribs with bayonet shortening of the 5th rib.  There is  subcutaneous emphysema however a pneumothorax is not seen.  Three small bands of pulmonary contusion noted in the left upper lobe.  Other fractures are not identified.  Mediastinal hemorrhage or hematoma is not seen.    Left scapula x-ray:  Negative plain x-rays of the left scapula.  Acute fractures of the left 2nd 3rd 4th and 5th ribs with subcutaneous emphysema and possible pneumothorax.    Left shoulder x-ray:  Acute fractures of the left  2nd 3rd 4th and 5th ribs with a possible small left apical pneumothorax and a small amount of subcutaneous emphysema.  A fracture of the scapula or of the shoulder itself is not seen.  Dislocation is not noted.     Left rib series:  Multiple displaced and angulated left rib fractures, similar in alignment comparing across modalities.  Pulmonary contusion in the left midlung zone.    Assessment/Plan:     * Multiple closed fractures of ribs of left side  Pulmonary contusion     2/2 fall from horse  Imaging + contusion, however -ve for PTX  Pain not controlled with prn pain meds.  Continue percocet with prn dilaudid for breakthrough  Continue Lidoderm patch.  Continue incentive spirometry.  Follow Dr. Garrett recommendations.      DAYANNA (generalized anxiety disorder)     Chronic problem, with acute decompensation  Increase clonopin to TID prn     Paroxysmal atrial fibrillation     S/p Ablation. Not on chronic anticoagulation therapy  Cardiac monitoring     Morbid obesity     Body mass index is 46.25 kg/m². Morbid obesity complicates all aspects of disease management from diagnostic modalities to treatment. Weight loss encouraged and health benefits explained to patient.      MANUELA on CPAP     Chronic issues  OK to use home CPAP.      Uncontrolled hypertension  Increase lisinopril 20 mg daily.  Use intravenous hydralazine 10 mg every 2 hr as needed for systolic blood pressure above 160 mm of mercury.    Again patient encouraged to use incentive spirometry.  Family members were updated.  Answered all questions.  Case discussed with Dr. Garrett who is planning to perform rib plating procedure on Thursday.    VTE Risk Mitigation (From admission, onward)        Ordered     enoxaparin injection 40 mg  Daily      03/09/19 1830     IP VTE HIGH RISK PATIENT  Once      03/09/19 1830        Susan Toure MD  Department of Hospital Medicine   [facility]

## 2019-03-13 LAB
POCT GLUCOSE: 134 MG/DL (ref 70–110)
POCT GLUCOSE: 183 MG/DL (ref 70–110)

## 2019-03-13 PROCEDURE — 25000003 PHARM REV CODE 250: Performed by: INTERNAL MEDICINE

## 2019-03-13 PROCEDURE — 94799 UNLISTED PULMONARY SVC/PX: CPT

## 2019-03-13 PROCEDURE — 99900035 HC TECH TIME PER 15 MIN (STAT)

## 2019-03-13 PROCEDURE — 12000002 HC ACUTE/MED SURGE SEMI-PRIVATE ROOM

## 2019-03-13 PROCEDURE — 94761 N-INVAS EAR/PLS OXIMETRY MLT: CPT

## 2019-03-13 PROCEDURE — 63600175 PHARM REV CODE 636 W HCPCS: Performed by: INTERNAL MEDICINE

## 2019-03-13 RX ORDER — AMLODIPINE BESYLATE 5 MG/1
5 TABLET ORAL DAILY
Status: DISCONTINUED | OUTPATIENT
Start: 2019-03-13 | End: 2019-03-17

## 2019-03-13 RX ADMIN — CLONAZEPAM 1 MG: 1 TABLET ORAL at 09:03

## 2019-03-13 RX ADMIN — OXYCODONE HYDROCHLORIDE AND ACETAMINOPHEN 1 TABLET: 10; 325 TABLET ORAL at 09:03

## 2019-03-13 RX ADMIN — HYDROMORPHONE HYDROCHLORIDE 1 MG: 2 INJECTION, SOLUTION INTRAMUSCULAR; INTRAVENOUS; SUBCUTANEOUS at 08:03

## 2019-03-13 RX ADMIN — AMLODIPINE BESYLATE 5 MG: 5 TABLET ORAL at 11:03

## 2019-03-13 RX ADMIN — ENOXAPARIN SODIUM 40 MG: 100 INJECTION SUBCUTANEOUS at 05:03

## 2019-03-13 RX ADMIN — LIDOCAINE 1 PATCH: 50 PATCH TOPICAL at 08:03

## 2019-03-13 RX ADMIN — OXYCODONE HYDROCHLORIDE AND ACETAMINOPHEN 1 TABLET: 10; 325 TABLET ORAL at 02:03

## 2019-03-13 RX ADMIN — CLONAZEPAM 1 MG: 1 TABLET ORAL at 11:03

## 2019-03-13 RX ADMIN — CLONAZEPAM 1 MG: 1 TABLET ORAL at 08:03

## 2019-03-13 RX ADMIN — HYDROMORPHONE HYDROCHLORIDE 1 MG: 2 INJECTION, SOLUTION INTRAMUSCULAR; INTRAVENOUS; SUBCUTANEOUS at 12:03

## 2019-03-13 RX ADMIN — LISINOPRIL 20 MG: 10 TABLET ORAL at 08:03

## 2019-03-13 RX ADMIN — HYDROMORPHONE HYDROCHLORIDE 1 MG: 2 INJECTION, SOLUTION INTRAMUSCULAR; INTRAVENOUS; SUBCUTANEOUS at 01:03

## 2019-03-13 RX ADMIN — OXYCODONE HYDROCHLORIDE AND ACETAMINOPHEN 1 TABLET: 10; 325 TABLET ORAL at 06:03

## 2019-03-13 RX ADMIN — HYDROMORPHONE HYDROCHLORIDE 1 MG: 2 INJECTION, SOLUTION INTRAMUSCULAR; INTRAVENOUS; SUBCUTANEOUS at 09:03

## 2019-03-13 RX ADMIN — OXYCODONE HYDROCHLORIDE AND ACETAMINOPHEN 1 TABLET: 10; 325 TABLET ORAL at 10:03

## 2019-03-13 RX ADMIN — SENNOSIDES AND DOCUSATE SODIUM 1 TABLET: 8.6; 5 TABLET ORAL at 08:03

## 2019-03-13 RX ADMIN — HYDROMORPHONE HYDROCHLORIDE 1 MG: 2 INJECTION, SOLUTION INTRAMUSCULAR; INTRAVENOUS; SUBCUTANEOUS at 05:03

## 2019-03-13 RX ADMIN — SENNOSIDES AND DOCUSATE SODIUM 1 TABLET: 8.6; 5 TABLET ORAL at 09:03

## 2019-03-13 RX ADMIN — HYDROMORPHONE HYDROCHLORIDE 1 MG: 2 INJECTION, SOLUTION INTRAMUSCULAR; INTRAVENOUS; SUBCUTANEOUS at 04:03

## 2019-03-13 NOTE — PLAN OF CARE
Problem: Adult Inpatient Plan of Care  Goal: Plan of Care Review  Outcome: Ongoing (interventions implemented as appropriate)  02 saturation 95% on room air.  Patient averaging 1500ml on IS.

## 2019-03-13 NOTE — PLAN OF CARE
Problem: Adult Inpatient Plan of Care  Goal: Plan of Care Review  Outcome: Ongoing (interventions implemented as appropriate)  Plan of care reviewed with patient. Pt verbalized understanding. Safety maintained throughout the shift. Bed locked, in lowest position, and call light within reach. Pt remained free of falls throughout shift. VS stable. Pt afebrile. POC glucose monitored. Tele monitored normal sinus rhythm throughout shift. PRN pain medications given as requested by patient with minimal relief. Pt had more complaints of pain tonight than previous night. CPAP worn throughout shift.  Will continue to monitor.

## 2019-03-13 NOTE — PLAN OF CARE
Problem: Adult Inpatient Plan of Care  Goal: Plan of Care Review  Outcome: Ongoing (interventions implemented as appropriate)  Plan of care reviewed with patient. Patient verbalized complete understanding. PRN pain administered as needed. Patient ambulated in hallway. Anticipating repair of L. Rib fractures Thursday 3-14 w/Dr. Garrett. Amlodipine initiated for BP management. All fall precautions maintained. Bed in lowest position, locked, call light within reach. Side rails up x's 2. Slip resistant socks maintained.

## 2019-03-13 NOTE — PROGRESS NOTES
Progress Note  Hospital Medicine  Patient Name:Bird Feng  MRN:  [id number]  Patient Class: IP- Inpatient  Admit Date: 3/9/2019  Length of Stay: 4 days  Expected Discharge Date:   Attending Physician: Susan Toure MD  Primary Care Provider:  Jared Villa MD    SUBJECTIVE:     Principal Problem: Multiple closed fractures of ribs of left side  Initial history of present illness: 54 y.o. male  has a past medical history of Atrial fibrillation (2006), Atrial fibrillation, Hypertension, and Sleep apnea who presents to the ED via EMS for evaluation of left shoulder pain. Pt reports being in his normal state of health until just prior to arrival when he began experiencing severe L shoulder pain after falling from horse. In the ED pt was treated with multiple doses of IV dilaudid before experiencing any relief. Imaging preformed in the ED showed multiple rib fractures. Given significant pain medication requirement Hospital medicine was consulted for admission.     PMH/PSH/SH/FH/Meds: reviewed.    Symptoms/Review of Systems:  Patient reports slight improvement in left chest wall, breathing better, family members at bedside. Blood pressure remains high despite increase in lisinopril dose yesterday. No shortness of breath, cough, or headache, fever or abdominal pain.  Diet:  Adequate intake.    Activity level: Normal.    Pain:  Left chest wall pain    OBJECTIVE:   Vital Signs (Most Recent):      Temp: 96.9 °F (36.1 °C) (03/13/19 0733)  Pulse: 73 (03/13/19 0815)  Resp: 16 (03/13/19 0815)  BP: (!) 181/99 (03/13/19 0733)  SpO2: 95 % (03/13/19 0815)       Vital Signs Range (Last 24H):  Temp:  [96.2 °F (35.7 °C)-98.5 °F (36.9 °C)]   Pulse:  [73-98]   Resp:  []   BP: (134-181)/(79-99)   SpO2:  [93 %-98 %]     Weight: (!) 167.8 kg (370 lb)  Body mass index is 46.25 kg/m².  No intake or output data in the 24 hours ending 03/13/19 1040  Physical Examination:  Constitutional: He is oriented to person, place, and time. He  appears well-developed and well-nourished. No distress.   HENT:   Head: Normocephalic and atraumatic.   Eyes: EOM are normal. Pupils are equal, round, and reactive to light.   Neck: Normal range of motion. Neck supple.   Cardiovascular: Regular rhythm.   Pulmonary/Chest: Effort normal and breath sounds normal. He has no rales.  Left posterior chest wall tender, no obvious crepitus felt.  Abdominal: Soft. Bowel sounds are normal. There is no tenderness.   Musculoskeletal: Normal range of motion.   Neurological: He is alert and oriented to person, place, and time.   Skin: Skin is warm and dry.   Excoriations on L lateral shoulder from fall     CBC:  Recent Labs   Lab 03/09/19  1541 03/10/19  0609 03/12/19  0643   WBC 24.40* 17.30* 9.30   RBC 5.03 5.02 4.36*   HGB 14.5 14.6 12.7*   HCT 44.1 44.3 38.7*    301 240   MCV 88 88 89   MCH 28.8 29.1 29.3   MCHC 32.9 33.0 33.0   BMP  Recent Labs   Lab 03/10/19  0609 03/11/19  0620 03/12/19  0643   * 138* 123*    135* 135*   K 4.3 4.2 4.2    104 102   CO2 17* 23 23   BUN 18 31* 20   CREATININE 1.1 1.3 0.9   CALCIUM 9.4 8.9 9.0      Diagnostic Results:  Microbiology Results (last 7 days)     ** No results found for the last 168 hours. **         CT scan of the chest without contrast:  Acute fractures of the left 2nd through 8th ribs.  Edge to edge apposition of the lower ribs with bayonet shortening of the 5th rib.  There is  subcutaneous emphysema however a pneumothorax is not seen.  Three small bands of pulmonary contusion noted in the left upper lobe.  Other fractures are not identified.  Mediastinal hemorrhage or hematoma is not seen.    Left scapula x-ray:  Negative plain x-rays of the left scapula.  Acute fractures of the left 2nd 3rd 4th and 5th ribs with subcutaneous emphysema and possible pneumothorax.    Left shoulder x-ray:  Acute fractures of the left 2nd 3rd 4th and 5th ribs with a possible small left apical pneumothorax and a small amount  of subcutaneous emphysema.  A fracture of the scapula or of the shoulder itself is not seen.  Dislocation is not noted.     Left rib series:  Multiple displaced and angulated left rib fractures, similar in alignment comparing across modalities.  Pulmonary contusion in the left midlung zone.    Assessment/Plan:     * Multiple closed fractures of ribs of left side  Pulmonary contusion     2/2 fall from horse  Imaging + contusion, however -ve for PTX  Pain not controlled with prn pain meds.  Continue percocet with prn dilaudid for breakthrough  Continue Lidoderm patch.  Continue incentive spirometry.  Follow Dr. Garrett recommendations.  NPO after midnight for rib plating planned for tomorrow.      DAYANNA (generalized anxiety disorder)     Chronic problem, with acute decompensation  Increase clonopin to TID prn     Paroxysmal atrial fibrillation     S/p Ablation. Not on chronic anticoagulation therapy  Cardiac monitoring     Morbid obesity     Body mass index is 46.25 kg/m². Morbid obesity complicates all aspects of disease management from diagnostic modalities to treatment. Weight loss encouraged and health benefits explained to patient.      MANUELA on CPAP     Chronic issues  OK to use home CPAP.      Uncontrolled hypertension  Increased lisinopril 20 mg daily.  Add Norvasc 5 mg daily.  Use intravenous hydralazine 10 mg every 2 hr as needed for systolic blood pressure above 160 mm of mercury.    VTE Risk Mitigation (From admission, onward)        Ordered     enoxaparin injection 40 mg  Daily      03/09/19 1830     IP VTE HIGH RISK PATIENT  Once      03/09/19 1830        Susan Toure MD  Department of Hospital Medicine   [facility]

## 2019-03-13 NOTE — PLAN OF CARE
03/12/19 2057   Patient Assessment/Suction   Level of Consciousness (AVPU) alert   Respiratory Effort Unlabored   PRE-TX-O2   O2 Device (Oxygen Therapy) room air   SpO2 97 %   Pulse Oximetry Type Intermittent   $ Pulse Oximetry - Multiple Charge Pulse Oximetry - Multiple   Incentive Spirometer   $ Incentive Spirometer Charges done with encouragement   Administration (IS) proper technique demonstrated   Number of Repetitions (IS) 10   Level Incentive Spirometer (mL) 1200   Patient Tolerance (IS) good

## 2019-03-14 ENCOUNTER — ANESTHESIA EVENT (OUTPATIENT)
Dept: SURGERY | Facility: HOSPITAL | Age: 54
DRG: 167 | End: 2019-03-14
Payer: COMMERCIAL

## 2019-03-14 LAB
ANION GAP SERPL CALC-SCNC: 8 MMOL/L
BASOPHILS # BLD AUTO: 0.1 K/UL
BASOPHILS NFR BLD: 1.7 %
BUN SERPL-MCNC: 12 MG/DL
CALCIUM SERPL-MCNC: 8.9 MG/DL
CHLORIDE SERPL-SCNC: 99 MMOL/L
CO2 SERPL-SCNC: 25 MMOL/L
CREAT SERPL-MCNC: 0.8 MG/DL
DIFFERENTIAL METHOD: ABNORMAL
EOSINOPHIL # BLD AUTO: 0.2 K/UL
EOSINOPHIL NFR BLD: 2.4 %
ERYTHROCYTE [DISTWIDTH] IN BLOOD BY AUTOMATED COUNT: 15.1 %
EST. GFR  (AFRICAN AMERICAN): >60 ML/MIN/1.73 M^2
EST. GFR  (NON AFRICAN AMERICAN): >60 ML/MIN/1.73 M^2
GLUCOSE SERPL-MCNC: 133 MG/DL
HCT VFR BLD AUTO: 38.9 %
HGB BLD-MCNC: 12.7 G/DL
LYMPHOCYTES # BLD AUTO: 1.4 K/UL
LYMPHOCYTES NFR BLD: 17.7 %
MCH RBC QN AUTO: 29 PG
MCHC RBC AUTO-ENTMCNC: 32.6 G/DL
MCV RBC AUTO: 89 FL
MONOCYTES # BLD AUTO: 0.9 K/UL
MONOCYTES NFR BLD: 11.9 %
NEUTROPHILS # BLD AUTO: 5.2 K/UL
NEUTROPHILS NFR BLD: 66.3 %
PLATELET # BLD AUTO: 288 K/UL
PMV BLD AUTO: 7.2 FL
POCT GLUCOSE: 114 MG/DL (ref 70–110)
POCT GLUCOSE: 128 MG/DL (ref 70–110)
POCT GLUCOSE: 130 MG/DL (ref 70–110)
POCT GLUCOSE: 139 MG/DL (ref 70–110)
POTASSIUM SERPL-SCNC: 4.3 MMOL/L
RBC # BLD AUTO: 4.36 M/UL
SODIUM SERPL-SCNC: 132 MMOL/L
WBC # BLD AUTO: 7.9 K/UL

## 2019-03-14 PROCEDURE — 63600175 PHARM REV CODE 636 W HCPCS: Performed by: INTERNAL MEDICINE

## 2019-03-14 PROCEDURE — 25000003 PHARM REV CODE 250: Performed by: INTERNAL MEDICINE

## 2019-03-14 PROCEDURE — 80048 BASIC METABOLIC PNL TOTAL CA: CPT

## 2019-03-14 PROCEDURE — 94761 N-INVAS EAR/PLS OXIMETRY MLT: CPT

## 2019-03-14 PROCEDURE — 12000002 HC ACUTE/MED SURGE SEMI-PRIVATE ROOM

## 2019-03-14 PROCEDURE — 36415 COLL VENOUS BLD VENIPUNCTURE: CPT

## 2019-03-14 PROCEDURE — 94799 UNLISTED PULMONARY SVC/PX: CPT

## 2019-03-14 PROCEDURE — 85025 COMPLETE CBC W/AUTO DIFF WBC: CPT

## 2019-03-14 RX ORDER — LACTULOSE 10 G/15ML
30 SOLUTION ORAL EVERY 6 HOURS PRN
Status: DISCONTINUED | OUTPATIENT
Start: 2019-03-14 | End: 2019-03-20 | Stop reason: HOSPADM

## 2019-03-14 RX ORDER — LIDOCAINE HYDROCHLORIDE 10 MG/ML
1 INJECTION, SOLUTION EPIDURAL; INFILTRATION; INTRACAUDAL; PERINEURAL ONCE
Status: CANCELLED | OUTPATIENT
Start: 2019-03-14 | End: 2019-03-14

## 2019-03-14 RX ORDER — SODIUM CHLORIDE, SODIUM LACTATE, POTASSIUM CHLORIDE, CALCIUM CHLORIDE 600; 310; 30; 20 MG/100ML; MG/100ML; MG/100ML; MG/100ML
INJECTION, SOLUTION INTRAVENOUS CONTINUOUS
Status: CANCELLED | OUTPATIENT
Start: 2019-03-14

## 2019-03-14 RX ORDER — ENOXAPARIN SODIUM 100 MG/ML
40 INJECTION SUBCUTANEOUS EVERY 12 HOURS
Status: DISCONTINUED | OUTPATIENT
Start: 2019-03-14 | End: 2019-03-20 | Stop reason: HOSPADM

## 2019-03-14 RX ADMIN — HYDROMORPHONE HYDROCHLORIDE 1 MG: 2 INJECTION, SOLUTION INTRAMUSCULAR; INTRAVENOUS; SUBCUTANEOUS at 12:03

## 2019-03-14 RX ADMIN — HYDROMORPHONE HYDROCHLORIDE 1 MG: 2 INJECTION, SOLUTION INTRAMUSCULAR; INTRAVENOUS; SUBCUTANEOUS at 01:03

## 2019-03-14 RX ADMIN — OXYCODONE HYDROCHLORIDE AND ACETAMINOPHEN 1 TABLET: 10; 325 TABLET ORAL at 10:03

## 2019-03-14 RX ADMIN — SENNOSIDES AND DOCUSATE SODIUM 1 TABLET: 8.6; 5 TABLET ORAL at 09:03

## 2019-03-14 RX ADMIN — HYDROMORPHONE HYDROCHLORIDE 1 MG: 2 INJECTION, SOLUTION INTRAMUSCULAR; INTRAVENOUS; SUBCUTANEOUS at 05:03

## 2019-03-14 RX ADMIN — CLONAZEPAM 1 MG: 1 TABLET ORAL at 07:03

## 2019-03-14 RX ADMIN — CLONAZEPAM 1 MG: 1 TABLET ORAL at 09:03

## 2019-03-14 RX ADMIN — CLONAZEPAM 1 MG: 1 TABLET ORAL at 12:03

## 2019-03-14 RX ADMIN — AMLODIPINE BESYLATE 5 MG: 5 TABLET ORAL at 09:03

## 2019-03-14 RX ADMIN — OXYCODONE HYDROCHLORIDE AND ACETAMINOPHEN 1 TABLET: 10; 325 TABLET ORAL at 03:03

## 2019-03-14 RX ADMIN — OXYCODONE HYDROCHLORIDE AND ACETAMINOPHEN 1 TABLET: 10; 325 TABLET ORAL at 11:03

## 2019-03-14 RX ADMIN — HYDROMORPHONE HYDROCHLORIDE 1 MG: 2 INJECTION, SOLUTION INTRAMUSCULAR; INTRAVENOUS; SUBCUTANEOUS at 09:03

## 2019-03-14 RX ADMIN — LISINOPRIL 20 MG: 10 TABLET ORAL at 09:03

## 2019-03-14 RX ADMIN — ENOXAPARIN SODIUM 40 MG: 100 INJECTION SUBCUTANEOUS at 11:03

## 2019-03-14 RX ADMIN — LIDOCAINE 1 PATCH: 50 PATCH TOPICAL at 09:03

## 2019-03-14 RX ADMIN — OXYCODONE HYDROCHLORIDE AND ACETAMINOPHEN 1 TABLET: 10; 325 TABLET ORAL at 07:03

## 2019-03-14 RX ADMIN — OXYCODONE HYDROCHLORIDE AND ACETAMINOPHEN 1 TABLET: 10; 325 TABLET ORAL at 02:03

## 2019-03-14 NOTE — PLAN OF CARE
Problem: Adult Inpatient Plan of Care  Goal: Plan of Care Review  Patient sats 96% on RA/ I S done 2000ml x 10bpm, patient has his own machine @bedside

## 2019-03-14 NOTE — PLAN OF CARE
Problem: Adult Inpatient Plan of Care  Goal: Plan of Care Review  Outcome: Ongoing (interventions implemented as appropriate)    Plan of care reviewed with patient and spouse. Pt verbalized understanding. Safety maintained throughout the shift. Bed locked, in lowest position, and call light within reach. Pt remained free of falls throughout shift. VS stable. Pt afebrile. POC glucose monitored. Tele monitored normal sinus rhythm throughout shift. PRN pain medications given as requested by patient with minimal relief. Pain medication given to pt within 15 min window of being due. Pt's pain appeared controlled and pt nor wife expressed any complaints or concerns during shift when asked. At 0500 pt's wife came to desk expressing that pt's pain had not been controlled all night that pain was able to be controlled during the day but had been uncontrolled during night. Charge nurse spoke with pt and re reviewed pain management plan. Pt's complaint also brought to attention of 3rd . I also spoke with pt and wife to try and meet any other needs I could fulfill.  CPAP was worn throughout the shift.  Will continue to monitor.

## 2019-03-14 NOTE — PROGRESS NOTES
Progress Note  Hospital Medicine  Patient Name:Bird Feng  MRN:  [id number]  Patient Class: IP- Inpatient  Admit Date: 3/9/2019  Length of Stay: 5 days  Expected Discharge Date:   Attending Physician: Susan Toure MD  Primary Care Provider:  Jared Villa MD    SUBJECTIVE:     Principal Problem: Multiple closed fractures of ribs of left side  Initial history of present illness: 54 y.o. male  has a past medical history of Atrial fibrillation (2006), Atrial fibrillation, Hypertension, and Sleep apnea who presents to the ED via EMS for evaluation of left shoulder pain. Pt reports being in his normal state of health until just prior to arrival when he began experiencing severe L shoulder pain after falling from horse. In the ED pt was treated with multiple doses of IV dilaudid before experiencing any relief. Imaging preformed in the ED showed multiple rib fractures. Given significant pain medication requirement Hospital medicine was consulted for admission.     PMH/PSH/SH/FH/Meds: reviewed.    Symptoms/Review of Systems:  Patient reports slight improvement in left chest wall, breathing better, family members at bedside. Patient is scheduled for rib plating. Blood pressure remains high despite increase in lisinopril dose yesterday. No shortness of breath, cough, or headache, fever or abdominal pain.  Diet: NPO  Activity level: Normal.    Pain:  Left chest wall pain    OBJECTIVE:   Vital Signs (Most Recent):      Temp: 96 °F (35.6 °C) (03/14/19 0500)  Pulse: 82 (03/14/19 0500)  Resp: 18 (03/14/19 0007)  BP: (!) 157/91 (03/14/19 0500)  SpO2: (!) 94 % (03/14/19 0500)       Vital Signs Range (Last 24H):  Temp:  [96 °F (35.6 °C)-98.4 °F (36.9 °C)]   Pulse:  [82-99]   Resp:  [12-18]   BP: (134-157)/(71-91)   SpO2:  [92 %-96 %]     Weight: (!) 167.8 kg (370 lb)  Body mass index is 46.25 kg/m².  No intake or output data in the 24 hours ending 03/14/19 0825  Physical Examination:  Constitutional: He is oriented to person,  place, and time. He appears well-developed and well-nourished. No distress.   HENT:   Head: Normocephalic and atraumatic.   Eyes: EOM are normal. Pupils are equal, round, and reactive to light.   Neck: Normal range of motion. Neck supple.   Cardiovascular: Regular rhythm.   Pulmonary/Chest: Effort normal and breath sounds normal. He has no rales.  Left posterior chest wall tender, no obvious crepitus felt.  Abdominal: Soft. Bowel sounds are normal. There is no tenderness.   Musculoskeletal: Normal range of motion.   Neurological: He is alert and oriented to person, place, and time.   Skin: Skin is warm and dry.   Excoriations on L lateral shoulder from fall     CBC:  Recent Labs   Lab 03/10/19  0609 03/12/19  0643 03/14/19  0705   WBC 17.30* 9.30 7.90   RBC 5.02 4.36* 4.36*   HGB 14.6 12.7* 12.7*   HCT 44.3 38.7* 38.9*    240 288   MCV 88 89 89   MCH 29.1 29.3 29.0   MCHC 33.0 33.0 32.6   BMP  Recent Labs   Lab 03/11/19  0620 03/12/19  0643 03/14/19  0705   * 123* 133*   * 135* 132*   K 4.2 4.2 4.3    102 99   CO2 23 23 25   BUN 31* 20 12   CREATININE 1.3 0.9 0.8   CALCIUM 8.9 9.0 8.9      Diagnostic Results:  Microbiology Results (last 7 days)     ** No results found for the last 168 hours. **         CT scan of the chest without contrast:  Acute fractures of the left 2nd through 8th ribs.  Edge to edge apposition of the lower ribs with bayonet shortening of the 5th rib.  There is  subcutaneous emphysema however a pneumothorax is not seen.  Three small bands of pulmonary contusion noted in the left upper lobe.  Other fractures are not identified.  Mediastinal hemorrhage or hematoma is not seen.    Left scapula x-ray:  Negative plain x-rays of the left scapula.  Acute fractures of the left 2nd 3rd 4th and 5th ribs with subcutaneous emphysema and possible pneumothorax.    Left shoulder x-ray:  Acute fractures of the left 2nd 3rd 4th and 5th ribs with a possible small left apical pneumothorax  and a small amount of subcutaneous emphysema.  A fracture of the scapula or of the shoulder itself is not seen.  Dislocation is not noted.     Left rib series:  Multiple displaced and angulated left rib fractures, similar in alignment comparing across modalities.  Pulmonary contusion in the left midlung zone.    Assessment/Plan:     * Multiple closed fractures of ribs of left side  Pulmonary contusion     2/2 fall from horse  Imaging + contusion, however -ve for PTX  Pain not controlled with prn pain meds.  Continue percocet with prn dilaudid for breakthrough  Continue Lidoderm patch.  Continue incentive spirometry.  Follow Dr. Garrett recommendations.  Patient is scheduled for rib plating today.      DAYANNA (generalized anxiety disorder)     Chronic problem, with acute decompensation  Increase clonopin to TID prn     Paroxysmal atrial fibrillation     S/p Ablation. Not on chronic anticoagulation therapy  Cardiac monitoring     Morbid obesity     Body mass index is 46.25 kg/m². Morbid obesity complicates all aspects of disease management from diagnostic modalities to treatment. Weight loss encouraged and health benefits explained to patient.      MANUELA on CPAP     Chronic issues  OK to use home CPAP.      Uncontrolled hypertension - better  Continue Norvasc and lisinopril.  Use intravenous hydralazine 10 mg every 2 hr as needed for systolic blood pressure above 160 mm of mercury.    VTE Risk Mitigation (From admission, onward)        Ordered     enoxaparin injection 40 mg  Daily      03/09/19 1830     IP VTE HIGH RISK PATIENT  Once      03/09/19 1830        Susan Toure MD  Department of Hospital Medicine   [facility]

## 2019-03-15 ENCOUNTER — ANESTHESIA (OUTPATIENT)
Dept: SURGERY | Facility: HOSPITAL | Age: 54
DRG: 167 | End: 2019-03-15
Payer: COMMERCIAL

## 2019-03-15 LAB
ABO + RH BLD: NORMAL
ANION GAP SERPL CALC-SCNC: 8 MMOL/L
APTT BLDCRRT: 30.4 SEC
BASOPHILS # BLD AUTO: 0 K/UL
BASOPHILS NFR BLD: 0.2 %
BLD GP AB SCN CELLS X3 SERPL QL: NORMAL
BLD PROD TYP BPU: NORMAL
BLD PROD TYP BPU: NORMAL
BLOOD UNIT EXPIRATION DATE: NORMAL
BLOOD UNIT EXPIRATION DATE: NORMAL
BLOOD UNIT TYPE CODE: 600
BLOOD UNIT TYPE CODE: 600
BLOOD UNIT TYPE: NORMAL
BLOOD UNIT TYPE: NORMAL
BUN SERPL-MCNC: 14 MG/DL
CALCIUM SERPL-MCNC: 9.3 MG/DL
CHLORIDE SERPL-SCNC: 101 MMOL/L
CO2 SERPL-SCNC: 26 MMOL/L
CODING SYSTEM: NORMAL
CODING SYSTEM: NORMAL
CREAT SERPL-MCNC: 0.9 MG/DL
DIFFERENTIAL METHOD: ABNORMAL
DISPENSE STATUS: NORMAL
DISPENSE STATUS: NORMAL
EOSINOPHIL # BLD AUTO: 0.3 K/UL
EOSINOPHIL NFR BLD: 2.6 %
ERYTHROCYTE [DISTWIDTH] IN BLOOD BY AUTOMATED COUNT: 15.4 %
EST. GFR  (AFRICAN AMERICAN): >60 ML/MIN/1.73 M^2
EST. GFR  (NON AFRICAN AMERICAN): >60 ML/MIN/1.73 M^2
GLUCOSE SERPL-MCNC: 125 MG/DL
HCT VFR BLD AUTO: 38.2 %
HGB BLD-MCNC: 12.6 G/DL
INR PPP: 1
LYMPHOCYTES # BLD AUTO: 1.4 K/UL
LYMPHOCYTES NFR BLD: 14.5 %
MCH RBC QN AUTO: 29.1 PG
MCHC RBC AUTO-ENTMCNC: 33 G/DL
MCV RBC AUTO: 88 FL
MONOCYTES # BLD AUTO: 0.6 K/UL
MONOCYTES NFR BLD: 6.6 %
NEUTROPHILS # BLD AUTO: 7.4 K/UL
NEUTROPHILS NFR BLD: 76.1 %
NUM UNITS TRANS PACKED RBC: NORMAL
NUM UNITS TRANS PACKED RBC: NORMAL
PLATELET # BLD AUTO: 311 K/UL
PMV BLD AUTO: 7.3 FL
POCT GLUCOSE: 118 MG/DL (ref 70–110)
POCT GLUCOSE: 139 MG/DL (ref 70–110)
POTASSIUM SERPL-SCNC: 4.7 MMOL/L
PROTHROMBIN TIME: 10 SEC
RBC # BLD AUTO: 4.33 M/UL
SODIUM SERPL-SCNC: 135 MMOL/L
WBC # BLD AUTO: 9.7 K/UL

## 2019-03-15 PROCEDURE — 99900104 DSU ONLY-NO CHARGE-EA ADD'L HR (STAT): Performed by: THORACIC SURGERY (CARDIOTHORACIC VASCULAR SURGERY)

## 2019-03-15 PROCEDURE — 63600175 PHARM REV CODE 636 W HCPCS: Performed by: ANESTHESIOLOGY

## 2019-03-15 PROCEDURE — 63600175 PHARM REV CODE 636 W HCPCS: Performed by: NURSE ANESTHETIST, CERTIFIED REGISTERED

## 2019-03-15 PROCEDURE — C1713 ANCHOR/SCREW BN/BN,TIS/BN: HCPCS | Performed by: THORACIC SURGERY (CARDIOTHORACIC VASCULAR SURGERY)

## 2019-03-15 PROCEDURE — 27200677 HC TRANSDUCER MONITOR KIT SINGLE: Performed by: ANESTHESIOLOGY

## 2019-03-15 PROCEDURE — 85610 PROTHROMBIN TIME: CPT

## 2019-03-15 PROCEDURE — D9220A PRA ANESTHESIA: Mod: CRNA,,, | Performed by: NURSE ANESTHETIST, CERTIFIED REGISTERED

## 2019-03-15 PROCEDURE — 71000039 HC RECOVERY, EACH ADD'L HOUR: Performed by: THORACIC SURGERY (CARDIOTHORACIC VASCULAR SURGERY)

## 2019-03-15 PROCEDURE — 80048 BASIC METABOLIC PNL TOTAL CA: CPT

## 2019-03-15 PROCEDURE — 25000003 PHARM REV CODE 250: Performed by: INTERNAL MEDICINE

## 2019-03-15 PROCEDURE — 63600175 PHARM REV CODE 636 W HCPCS: Performed by: THORACIC SURGERY (CARDIOTHORACIC VASCULAR SURGERY)

## 2019-03-15 PROCEDURE — 36620 PR INSERT CATH,ART,PERCUT,SHORTTERM: ICD-10-PCS | Mod: 59,,, | Performed by: ANESTHESIOLOGY

## 2019-03-15 PROCEDURE — 25000003 PHARM REV CODE 250: Performed by: NURSE ANESTHETIST, CERTIFIED REGISTERED

## 2019-03-15 PROCEDURE — 94761 N-INVAS EAR/PLS OXIMETRY MLT: CPT

## 2019-03-15 PROCEDURE — 85730 THROMBOPLASTIN TIME PARTIAL: CPT

## 2019-03-15 PROCEDURE — D9220A PRA ANESTHESIA: Mod: ANES,,, | Performed by: ANESTHESIOLOGY

## 2019-03-15 PROCEDURE — 27800505 HC CATH, RADIAL ARTERY KIT: Performed by: ANESTHESIOLOGY

## 2019-03-15 PROCEDURE — 25000003 PHARM REV CODE 250: Performed by: THORACIC SURGERY (CARDIOTHORACIC VASCULAR SURGERY)

## 2019-03-15 PROCEDURE — 37000008 HC ANESTHESIA 1ST 15 MINUTES: Performed by: THORACIC SURGERY (CARDIOTHORACIC VASCULAR SURGERY)

## 2019-03-15 PROCEDURE — 36000705 HC OR TIME LEV I EA ADD 15 MIN: Performed by: THORACIC SURGERY (CARDIOTHORACIC VASCULAR SURGERY)

## 2019-03-15 PROCEDURE — P9045 ALBUMIN (HUMAN), 5%, 250 ML: HCPCS | Performed by: NURSE ANESTHETIST, CERTIFIED REGISTERED

## 2019-03-15 PROCEDURE — C1729 CATH, DRAINAGE: HCPCS | Performed by: THORACIC SURGERY (CARDIOTHORACIC VASCULAR SURGERY)

## 2019-03-15 PROCEDURE — 20000000 HC ICU ROOM

## 2019-03-15 PROCEDURE — 94799 UNLISTED PULMONARY SVC/PX: CPT

## 2019-03-15 PROCEDURE — 85025 COMPLETE CBC W/AUTO DIFF WBC: CPT

## 2019-03-15 PROCEDURE — 36000704 HC OR TIME LEV I 1ST 15 MIN: Performed by: THORACIC SURGERY (CARDIOTHORACIC VASCULAR SURGERY)

## 2019-03-15 PROCEDURE — 86920 COMPATIBILITY TEST SPIN: CPT

## 2019-03-15 PROCEDURE — 63600175 PHARM REV CODE 636 W HCPCS: Performed by: INTERNAL MEDICINE

## 2019-03-15 PROCEDURE — 99900103 DSU ONLY-NO CHARGE-INITIAL HR (STAT): Performed by: THORACIC SURGERY (CARDIOTHORACIC VASCULAR SURGERY)

## 2019-03-15 PROCEDURE — D9220A PRA ANESTHESIA: ICD-10-PCS | Mod: ANES,,, | Performed by: ANESTHESIOLOGY

## 2019-03-15 PROCEDURE — 27201423 OPTIME MED/SURG SUP & DEVICES STERILE SUPPLY: Performed by: THORACIC SURGERY (CARDIOTHORACIC VASCULAR SURGERY)

## 2019-03-15 PROCEDURE — 37000009 HC ANESTHESIA EA ADD 15 MINS: Performed by: THORACIC SURGERY (CARDIOTHORACIC VASCULAR SURGERY)

## 2019-03-15 PROCEDURE — 36415 COLL VENOUS BLD VENIPUNCTURE: CPT

## 2019-03-15 PROCEDURE — 86901 BLOOD TYPING SEROLOGIC RH(D): CPT

## 2019-03-15 PROCEDURE — D9220A PRA ANESTHESIA: ICD-10-PCS | Mod: CRNA,,, | Performed by: NURSE ANESTHETIST, CERTIFIED REGISTERED

## 2019-03-15 PROCEDURE — 71000033 HC RECOVERY, INTIAL HOUR: Performed by: THORACIC SURGERY (CARDIOTHORACIC VASCULAR SURGERY)

## 2019-03-15 PROCEDURE — S0020 INJECTION, BUPIVICAINE HYDRO: HCPCS | Performed by: THORACIC SURGERY (CARDIOTHORACIC VASCULAR SURGERY)

## 2019-03-15 PROCEDURE — 36620 INSERTION CATHETER ARTERY: CPT | Mod: 59,,, | Performed by: ANESTHESIOLOGY

## 2019-03-15 PROCEDURE — P9016 RBC LEUKOCYTES REDUCED: HCPCS

## 2019-03-15 DEVICE — IMPLANTABLE DEVICE: Type: IMPLANTABLE DEVICE | Site: CHEST  WALL | Status: FUNCTIONAL

## 2019-03-15 RX ORDER — HYDROMORPHONE HYDROCHLORIDE 2 MG/ML
1 INJECTION, SOLUTION INTRAMUSCULAR; INTRAVENOUS; SUBCUTANEOUS ONCE
Status: COMPLETED | OUTPATIENT
Start: 2019-03-15 | End: 2019-03-15

## 2019-03-15 RX ORDER — LIDOCAINE HCL/PF 100 MG/5ML
SYRINGE (ML) INTRAVENOUS
Status: DISCONTINUED | OUTPATIENT
Start: 2019-03-15 | End: 2019-03-15

## 2019-03-15 RX ORDER — HYDROMORPHONE HYDROCHLORIDE 2 MG/ML
0.5 INJECTION, SOLUTION INTRAMUSCULAR; INTRAVENOUS; SUBCUTANEOUS EVERY 5 MIN PRN
Status: COMPLETED | OUTPATIENT
Start: 2019-03-15 | End: 2019-03-15

## 2019-03-15 RX ORDER — GLYCOPYRROLATE 0.2 MG/ML
INJECTION INTRAMUSCULAR; INTRAVENOUS
Status: DISCONTINUED | OUTPATIENT
Start: 2019-03-15 | End: 2019-03-15

## 2019-03-15 RX ORDER — SODIUM CHLORIDE 9 MG/ML
INJECTION, SOLUTION INTRAVENOUS CONTINUOUS PRN
Status: DISCONTINUED | OUTPATIENT
Start: 2019-03-15 | End: 2019-03-15

## 2019-03-15 RX ORDER — SODIUM CHLORIDE, SODIUM LACTATE, POTASSIUM CHLORIDE, CALCIUM CHLORIDE 600; 310; 30; 20 MG/100ML; MG/100ML; MG/100ML; MG/100ML
INJECTION, SOLUTION INTRAVENOUS CONTINUOUS PRN
Status: DISCONTINUED | OUTPATIENT
Start: 2019-03-15 | End: 2019-03-15

## 2019-03-15 RX ORDER — BUPIVACAINE HYDROCHLORIDE 5 MG/ML
INJECTION, SOLUTION EPIDURAL; INTRACAUDAL
Status: DISCONTINUED | OUTPATIENT
Start: 2019-03-15 | End: 2019-03-15 | Stop reason: HOSPADM

## 2019-03-15 RX ORDER — NALOXONE HCL 0.4 MG/ML
0.02 VIAL (ML) INJECTION
Status: DISCONTINUED | OUTPATIENT
Start: 2019-03-15 | End: 2019-03-20 | Stop reason: HOSPADM

## 2019-03-15 RX ORDER — PHENYLEPHRINE HYDROCHLORIDE 10 MG/ML
INJECTION INTRAVENOUS
Status: DISCONTINUED | OUTPATIENT
Start: 2019-03-15 | End: 2019-03-15

## 2019-03-15 RX ORDER — PROPOFOL 10 MG/ML
VIAL (ML) INTRAVENOUS
Status: DISCONTINUED | OUTPATIENT
Start: 2019-03-15 | End: 2019-03-15

## 2019-03-15 RX ORDER — SUCCINYLCHOLINE CHLORIDE 20 MG/ML
INJECTION INTRAMUSCULAR; INTRAVENOUS
Status: DISCONTINUED | OUTPATIENT
Start: 2019-03-15 | End: 2019-03-15

## 2019-03-15 RX ORDER — BACITRACIN 50000 [IU]/1
INJECTION, POWDER, FOR SOLUTION INTRAMUSCULAR
Status: DISCONTINUED | OUTPATIENT
Start: 2019-03-15 | End: 2019-03-15 | Stop reason: HOSPADM

## 2019-03-15 RX ORDER — CEFAZOLIN SODIUM 2 G/50ML
2 SOLUTION INTRAVENOUS
Status: COMPLETED | OUTPATIENT
Start: 2019-03-15 | End: 2019-03-15

## 2019-03-15 RX ORDER — KETAMINE HYDROCHLORIDE 100 MG/ML
INJECTION, SOLUTION INTRAMUSCULAR; INTRAVENOUS
Status: DISCONTINUED | OUTPATIENT
Start: 2019-03-15 | End: 2019-03-15

## 2019-03-15 RX ORDER — FENTANYL CITRATE 50 UG/ML
INJECTION, SOLUTION INTRAMUSCULAR; INTRAVENOUS
Status: DISCONTINUED | OUTPATIENT
Start: 2019-03-15 | End: 2019-03-15

## 2019-03-15 RX ORDER — ONDANSETRON 2 MG/ML
4 INJECTION INTRAMUSCULAR; INTRAVENOUS EVERY 12 HOURS PRN
Status: DISCONTINUED | OUTPATIENT
Start: 2019-03-15 | End: 2019-03-20 | Stop reason: HOSPADM

## 2019-03-15 RX ORDER — ALBUMIN HUMAN 50 G/1000ML
SOLUTION INTRAVENOUS CONTINUOUS PRN
Status: DISCONTINUED | OUTPATIENT
Start: 2019-03-15 | End: 2019-03-15

## 2019-03-15 RX ORDER — MIDAZOLAM HYDROCHLORIDE 1 MG/ML
INJECTION, SOLUTION INTRAMUSCULAR; INTRAVENOUS
Status: DISCONTINUED | OUTPATIENT
Start: 2019-03-15 | End: 2019-03-15

## 2019-03-15 RX ORDER — HYDROCODONE BITARTRATE AND ACETAMINOPHEN 500; 5 MG/1; MG/1
TABLET ORAL
Status: DISCONTINUED | OUTPATIENT
Start: 2019-03-15 | End: 2019-03-15 | Stop reason: HOSPADM

## 2019-03-15 RX ORDER — ROCURONIUM BROMIDE 10 MG/ML
INJECTION, SOLUTION INTRAVENOUS
Status: DISCONTINUED | OUTPATIENT
Start: 2019-03-15 | End: 2019-03-15

## 2019-03-15 RX ORDER — DIPHENHYDRAMINE HYDROCHLORIDE 50 MG/ML
12.5 INJECTION INTRAMUSCULAR; INTRAVENOUS EVERY 4 HOURS PRN
Status: DISCONTINUED | OUTPATIENT
Start: 2019-03-15 | End: 2019-03-20

## 2019-03-15 RX ORDER — ACETAMINOPHEN 10 MG/ML
INJECTION, SOLUTION INTRAVENOUS
Status: DISCONTINUED | OUTPATIENT
Start: 2019-03-15 | End: 2019-03-15

## 2019-03-15 RX ORDER — SODIUM CHLORIDE 0.9 % (FLUSH) 0.9 %
3 SYRINGE (ML) INJECTION
Status: DISCONTINUED | OUTPATIENT
Start: 2019-03-15 | End: 2019-03-18

## 2019-03-15 RX ORDER — DEXAMETHASONE SODIUM PHOSPHATE 4 MG/ML
INJECTION, SOLUTION INTRA-ARTICULAR; INTRALESIONAL; INTRAMUSCULAR; INTRAVENOUS; SOFT TISSUE
Status: DISCONTINUED | OUTPATIENT
Start: 2019-03-15 | End: 2019-03-15

## 2019-03-15 RX ORDER — HYDROMORPHONE HYDROCHLORIDE 2 MG/ML
0.2 INJECTION, SOLUTION INTRAMUSCULAR; INTRAVENOUS; SUBCUTANEOUS EVERY 5 MIN PRN
Status: DISCONTINUED | OUTPATIENT
Start: 2019-03-15 | End: 2019-03-20

## 2019-03-15 RX ORDER — HYDROMORPHONE HCL IN 0.9% NACL 6 MG/30 ML
PATIENT CONTROLLED ANALGESIA SYRINGE INTRAVENOUS CONTINUOUS
Status: DISCONTINUED | OUTPATIENT
Start: 2019-03-15 | End: 2019-03-15

## 2019-03-15 RX ADMIN — OXYCODONE HYDROCHLORIDE AND ACETAMINOPHEN 1 TABLET: 10; 325 TABLET ORAL at 07:03

## 2019-03-15 RX ADMIN — HYDROMORPHONE HYDROCHLORIDE 0.2 MG: 2 INJECTION, SOLUTION INTRAMUSCULAR; INTRAVENOUS; SUBCUTANEOUS at 07:03

## 2019-03-15 RX ADMIN — ROCURONIUM BROMIDE 30 MG: 10 INJECTION, SOLUTION INTRAVENOUS at 01:03

## 2019-03-15 RX ADMIN — ONDANSETRON 4 MG: 2 INJECTION, SOLUTION INTRAMUSCULAR; INTRAVENOUS at 01:03

## 2019-03-15 RX ADMIN — PHENYLEPHRINE HYDROCHLORIDE 100 MCG: 10 INJECTION INTRAVENOUS at 03:03

## 2019-03-15 RX ADMIN — ROCURONIUM BROMIDE 20 MG: 10 INJECTION, SOLUTION INTRAVENOUS at 01:03

## 2019-03-15 RX ADMIN — CLONAZEPAM 1 MG: 1 TABLET ORAL at 07:03

## 2019-03-15 RX ADMIN — SODIUM CHLORIDE, SODIUM LACTATE, POTASSIUM CHLORIDE, AND CALCIUM CHLORIDE: .6; .31; .03; .02 INJECTION, SOLUTION INTRAVENOUS at 02:03

## 2019-03-15 RX ADMIN — PHENYLEPHRINE HYDROCHLORIDE 100 MCG: 10 INJECTION INTRAVENOUS at 02:03

## 2019-03-15 RX ADMIN — ROCURONIUM BROMIDE 50 MG: 10 INJECTION, SOLUTION INTRAVENOUS at 02:03

## 2019-03-15 RX ADMIN — MIDAZOLAM 2 MG: 1 INJECTION INTRAMUSCULAR; INTRAVENOUS at 01:03

## 2019-03-15 RX ADMIN — HYDROMORPHONE HYDROCHLORIDE 1 MG: 2 INJECTION, SOLUTION INTRAMUSCULAR; INTRAVENOUS; SUBCUTANEOUS at 05:03

## 2019-03-15 RX ADMIN — HYDROMORPHONE HYDROCHLORIDE 0.5 MG: 2 INJECTION INTRAMUSCULAR; INTRAVENOUS; SUBCUTANEOUS at 11:03

## 2019-03-15 RX ADMIN — CEFAZOLIN SODIUM 2 G: 2 SOLUTION INTRAVENOUS at 01:03

## 2019-03-15 RX ADMIN — ROCURONIUM BROMIDE 40 MG: 10 INJECTION, SOLUTION INTRAVENOUS at 03:03

## 2019-03-15 RX ADMIN — LIDOCAINE HYDROCHLORIDE 100 MG: 20 INJECTION, SOLUTION INTRAVENOUS at 01:03

## 2019-03-15 RX ADMIN — FENTANYL CITRATE 50 MCG: 50 INJECTION, SOLUTION INTRAMUSCULAR; INTRAVENOUS at 01:03

## 2019-03-15 RX ADMIN — PROPOFOL 200 MG: 10 INJECTION, EMULSION INTRAVENOUS at 01:03

## 2019-03-15 RX ADMIN — SODIUM CHLORIDE, SODIUM LACTATE, POTASSIUM CHLORIDE, AND CALCIUM CHLORIDE: .6; .31; .03; .02 INJECTION, SOLUTION INTRAVENOUS at 03:03

## 2019-03-15 RX ADMIN — OXYCODONE HYDROCHLORIDE AND ACETAMINOPHEN 1 TABLET: 10; 325 TABLET ORAL at 09:03

## 2019-03-15 RX ADMIN — GLYCOPYRROLATE 0.2 MG: 0.2 INJECTION, SOLUTION INTRAMUSCULAR; INTRAVENOUS at 02:03

## 2019-03-15 RX ADMIN — CLONAZEPAM 1 MG: 1 TABLET ORAL at 10:03

## 2019-03-15 RX ADMIN — HYDROMORPHONE HYDROCHLORIDE 1 MG: 2 INJECTION INTRAMUSCULAR; INTRAVENOUS; SUBCUTANEOUS at 12:03

## 2019-03-15 RX ADMIN — DEXAMETHASONE SODIUM PHOSPHATE 8 MG: 4 INJECTION, SOLUTION INTRAMUSCULAR; INTRAVENOUS at 01:03

## 2019-03-15 RX ADMIN — CLONAZEPAM 1 MG: 1 TABLET ORAL at 12:03

## 2019-03-15 RX ADMIN — SODIUM CHLORIDE: 0.9 INJECTION, SOLUTION INTRAVENOUS at 03:03

## 2019-03-15 RX ADMIN — HYDROMORPHONE HYDROCHLORIDE 1 MG: 2 INJECTION, SOLUTION INTRAMUSCULAR; INTRAVENOUS; SUBCUTANEOUS at 01:03

## 2019-03-15 RX ADMIN — FENTANYL CITRATE 100 MCG: 50 INJECTION, SOLUTION INTRAMUSCULAR; INTRAVENOUS at 03:03

## 2019-03-15 RX ADMIN — HYDROMORPHONE HYDROCHLORIDE 1 MG: 2 INJECTION, SOLUTION INTRAMUSCULAR; INTRAVENOUS; SUBCUTANEOUS at 09:03

## 2019-03-15 RX ADMIN — KETAMINE HYDROCHLORIDE 80 MG: 100 INJECTION, SOLUTION, CONCENTRATE INTRAMUSCULAR; INTRAVENOUS at 01:03

## 2019-03-15 RX ADMIN — SODIUM CHLORIDE, SODIUM LACTATE, POTASSIUM CHLORIDE, AND CALCIUM CHLORIDE: .6; .31; .03; .02 INJECTION, SOLUTION INTRAVENOUS at 12:03

## 2019-03-15 RX ADMIN — FENTANYL CITRATE 50 MCG: 50 INJECTION, SOLUTION INTRAMUSCULAR; INTRAVENOUS at 06:03

## 2019-03-15 RX ADMIN — ACETAMINOPHEN 1000 MG: 10 INJECTION, SOLUTION INTRAVENOUS at 02:03

## 2019-03-15 RX ADMIN — KETAMINE HYDROCHLORIDE 30 MG: 100 INJECTION, SOLUTION, CONCENTRATE INTRAMUSCULAR; INTRAVENOUS at 06:03

## 2019-03-15 RX ADMIN — ALBUMIN (HUMAN): 12.5 SOLUTION INTRAVENOUS at 02:03

## 2019-03-15 RX ADMIN — SUCCINYLCHOLINE CHLORIDE 160 MG: 20 INJECTION, SOLUTION INTRAMUSCULAR; INTRAVENOUS at 01:03

## 2019-03-15 RX ADMIN — SUGAMMADEX 500 MG: 100 INJECTION, SOLUTION INTRAVENOUS at 05:03

## 2019-03-15 RX ADMIN — OXYCODONE HYDROCHLORIDE AND ACETAMINOPHEN 1 TABLET: 10; 325 TABLET ORAL at 02:03

## 2019-03-15 RX ADMIN — KETAMINE HYDROCHLORIDE 20 MG: 100 INJECTION, SOLUTION, CONCENTRATE INTRAMUSCULAR; INTRAVENOUS at 11:03

## 2019-03-15 RX ADMIN — SENNOSIDES AND DOCUSATE SODIUM 1 TABLET: 8.6; 5 TABLET ORAL at 09:03

## 2019-03-15 NOTE — TRANSFER OF CARE
"Anesthesia Transfer of Care Note    Patient: Bird Feng    Procedure(s) Performed: Procedure(s) (LRB):  ORIF, FRACTURE, RIBS (Left)    Patient location: ICU    Anesthesia Type: general    Transport from OR: Transported from OR on 6-10 L/min O2 by face mask with adequate spontaneous ventilation. Continuous SpO2 monitoring in transport. Continuous ECG monitoring in transport    Post pain: adequate analgesia    Post assessment: no apparent anesthetic complications    Post vital signs: stable    Level of consciousness: awake    Nausea/Vomiting: no nausea/vomiting    Complications: none    Transfer of care protocol was followed      Last vitals:   Visit Vitals  BP (!) 185/106 (BP Location: Right arm, Patient Position: Lying)   Pulse (!) 114   Temp 37 °C (98.6 °F) (Skin)   Resp (!) 22   Ht 6' 3" (1.905 m)   Wt (!) 167.8 kg (370 lb)   SpO2 97%   BMI 46.25 kg/m²     "

## 2019-03-15 NOTE — PLAN OF CARE
Pt received by w/c to bay 6 in pre op area.  C/o pain 10/10.  Unable to get comfortable.  Attempted lying in bed, sitting in chair, sitting on side of bed, standing.  Denies relief in any position.  Seen by anesthesia, spouse at bedside

## 2019-03-15 NOTE — PLAN OF CARE
Problem: Adult Inpatient Plan of Care  Goal: Plan of Care Review  Outcome: Ongoing (interventions implemented as appropriate)  Plan of care reviewed with pt and wife they verbalized understanding. Medicated for pain ATC pt is stating he is moderately relieved. Pt ambulates with FWW . Able to make his needs known. CPAP in place pt sleeps in chair r/t pain. He is to NPO at midnight for surgery tomorrow. Call light within reach will cont to monitor.

## 2019-03-15 NOTE — PLAN OF CARE
Problem: Adult Inpatient Plan of Care  Goal: Plan of Care Review  AOx3. IV site wnl. Hyoactive bowel sounds noted. Denies cough, congestion, s.o.b.Intermittent pain which is 8-10 on pain scale. Wife walked with pt. In hallway at 3:30 in morning. Pt. Staying night in recliner chair.Voiding wnl. Medicated for complaints of constipation.

## 2019-03-15 NOTE — BRIEF OP NOTE
[facility]  Brief Operative Note    SUMMARY     Surgery Date: 3/15/2019     Surgeon(s) and Role:     * Eric Garrett MD - Primary    Assisting Surgeon: None    Pre-op Diagnosis:  Fall [W19.XXXA]    Post-op Diagnosis:  Post-Op Diagnosis Codes:     * Fall [W19.XXXA]    Procedure(s) (LRB):  ORIF, FRACTURE, RIBS (Left)    Anesthesia: General    Description of Procedure:   Plated 4 ribs that were significantly displaced. Very difficult exposure due to patients body habitus and location under scapula  Encountered some bleeding when upper rib reduced likely from intercostal vessel identified and controlled with clips  Chest tube placed L pleural space, small output serosanguinous fluid  Patrice drain under chest wall muscles      Estimated Blood Loss: 600 mL         Specimens:   Specimen (12h ago, onward)    None

## 2019-03-15 NOTE — PROGRESS NOTES
Quiet at present but agitated and restless at times.  Attempting to get OOB.  Instructed that he cannot get up because of medications that he has rec'd.  Repositioned multiple times for comfort.  Ice pack to left lateral chest

## 2019-03-15 NOTE — NURSING
pt has surgery at 0945 asked for Clonazepam 1mg, called anesthesia, states can give right now with a sip of water.

## 2019-03-15 NOTE — NURSING
Situation Principle Problem:  Multiple closed fractures of ribs of left side      Reason for Calling:Pt is requesting oral lactulose feels constipated has surgery in am. Please and thank you    Provider Calling: Ya Harper   Background Vitals:    03/14/19 0500 03/14/19 0750 03/14/19 0800 03/14/19 1544   BP: (!) 157/91  (!) 138/92 136/82   Patient Position:       Pulse: 82 87 76 79   Resp:  16 18 20   Temp: 96 °F (35.6 °C)  97.4 °F (36.3 °C) 97.5 °F (36.4 °C)   TempSrc:   Axillary Axillary   SpO2: (!) 94% 96%  (!) 94%   Weight:       Height:           POCT Glucose   Date Value Ref Range Status   03/14/2019 130 (H) 70 - 110 mg/dL Final   03/14/2019 114 (H) 70 - 110 mg/dL Final   03/14/2019 128 (H) 70 - 110 mg/dL Final   03/13/2019 183 (H) 70 - 110 mg/dL Final   03/13/2019 134 (H) 70 - 110 mg/dL Final   03/12/2019 149 (H) 70 - 110 mg/dL Final   03/12/2019 148 (H) 70 - 110 mg/dL Final       Intake/Output:  No intake or output data in the 24 hours ending 03/14/19 1912     Assessment What is happening: ***   Response Provider Response: ***

## 2019-03-15 NOTE — ANESTHESIA PREPROCEDURE EVALUATION
03/15/2019  Bird Feng is a 54 y.o., male.    Anesthesia Evaluation    I have reviewed the Patient Summary Reports.    I have reviewed the Nursing Notes.   I have reviewed the Medications.     Review of Systems  Anesthesia Hx:  Denies Family Hx of Anesthesia complications.   Denies Personal Hx of Anesthesia complications.   Cardiovascular:   Hypertension, poorly controlled    Pulmonary:   Sleep Apnea, CPAP    Psych:   Psychiatric History          Physical Exam  General:  Morbid Obesity, Malnutrition    Airway/Jaw/Neck:  Airway Findings: Mouth Opening: Normal Tongue: Normal  General Airway Assessment: Adult  Mallampati: III  TM Distance: Normal, at least 6 cm  Jaw/Neck Findings:  Neck ROM: Extension Decreased, Mild  Neck Findings:  Girth Increased      Dental:  Dental Findings:   Chest/Lungs:  Chest/Lungs Findings: Decreased Breath Sounds Bilateral, Clear to auscultation     Heart/Vascular:  Heart Findings: Rate: Normal  Rhythm: Regular Rhythm             Anesthesia Plan  Type of Anesthesia, risks & benefits discussed:  Anesthesia Type:  general  Patient's Preference:   Intra-op Monitoring Plan: standard ASA monitors  Intra-op Monitoring Plan Comments:   Post Op Pain Control Plan:   Post Op Pain Control Plan Comments:   Induction:   IV  Beta Blocker:  Patient is not currently on a Beta-Blocker (No further documentation required).       Informed Consent: Patient understands risks and agrees with Anesthesia plan.  Questions answered. Anesthesia consent signed with patient.  ASA Score: 4     Day of Surgery Review of History & Physical:    H&P update referred to the surgeon.         Ready For Surgery From Anesthesia Perspective.

## 2019-03-15 NOTE — PROGRESS NOTES
Dr Martin at bedside, 20 mg Ketamine IV given per Dr Martin.  Pt on oxygen at 2L/NC, sat monitor on.  Side rails up, wife at bedside.  , O2 sat %

## 2019-03-15 NOTE — ANESTHESIA PROCEDURE NOTES
Arterial    Diagnosis: hemorrhage  Doctor requesting consult: Groglio    Patient location during procedure: done in OR  Procedure start time: 3/15/2019 3:31 PM  Procedure end time: 3/15/2019 3:34 PM  Staffing  Anesthesiologist: Ana Cuello MD  Performed: anesthesiologist   Anesthesiologist was present at the time of the procedure.  Preanesthetic Checklist  Completed: patient identified, site marked, surgical consent, pre-op evaluation, timeout performed, IV checked, risks and benefits discussed, monitors and equipment checked and anesthesia consent givenArterial  Skin Prep: chlorhexidine gluconate and isopropyl alcohol  Local Infiltration: none  Orientation: right  Location: radial  Catheter Size: 20 G  Catheter placement by Anatomical landmarks. Heme positive aspiration all ports.Insertion Attempts: 1  Assessment  Dressing: secured with tape and tegaderm

## 2019-03-15 NOTE — PROGRESS NOTES
Progress Note  Hospital Medicine  Patient Name:Bird Feng  MRN:  [id number]  Patient Class: IP- Inpatient  Admit Date: 3/9/2019  Length of Stay: 6 days  Expected Discharge Date:   Attending Physician: Susan Toure MD  Primary Care Provider:  Jared Villa MD    SUBJECTIVE:     Principal Problem: Multiple closed fractures of ribs of left side  Initial history of present illness: 54 y.o. male  has a past medical history of Atrial fibrillation (2006), Atrial fibrillation, Hypertension, and Sleep apnea who presents to the ED via EMS for evaluation of left shoulder pain. Pt reports being in his normal state of health until just prior to arrival when he began experiencing severe L shoulder pain after falling from horse. In the ED pt was treated with multiple doses of IV dilaudid before experiencing any relief. Imaging preformed in the ED showed multiple rib fractures. Given significant pain medication requirement Hospital medicine was consulted for admission.     PMH/PSH/SH/FH/Meds: reviewed.    Symptoms/Review of Systems:  Patient reports slight improvement in left chest wall, breathing better, family members at bedside. Patient is scheduled for rib plating. Blood pressure remains high despite increase in lisinopril dose yesterday. No shortness of breath, cough, or headache, fever or abdominal pain.  Diet: NPO  Activity level: Normal.    Pain:  Left chest wall pain    OBJECTIVE:   Vital Signs (Most Recent):      Temp: 97.8 °F (36.6 °C) (03/15/19 0853)  Pulse: 77 (03/15/19 0853)  Resp: 18 (03/15/19 0853)  BP: 128/76 (03/15/19 0853)  SpO2: 95 % (03/15/19 0853)       Vital Signs Range (Last 24H):  Temp:  [97.5 °F (36.4 °C)-97.9 °F (36.6 °C)]   Pulse:  [77-90]   Resp:  [18-20]   BP: (128-142)/(67-82)   SpO2:  [94 %-96 %]     Weight: (!) 167.8 kg (370 lb)  Body mass index is 46.25 kg/m².    Intake/Output Summary (Last 24 hours) at 3/15/2019 1023  Last data filed at 3/15/2019 0600  Gross per 24 hour   Intake 300 ml   Output  --   Net 300 ml     Physical Examination:  Constitutional: He is oriented to person, place, and time. He appears well-developed and well-nourished. No distress.   HENT:   Head: Normocephalic and atraumatic.   Eyes: EOM are normal. Pupils are equal, round, and reactive to light.   Neck: Normal range of motion. Neck supple.   Cardiovascular: Regular rhythm.   Pulmonary/Chest: Effort normal and breath sounds normal. He has no rales.  Left posterior chest wall tender, no obvious crepitus felt.  Abdominal: Soft. Bowel sounds are normal. There is no tenderness.   Musculoskeletal: Normal range of motion.   Neurological: He is alert and oriented to person, place, and time.   Skin: Skin is warm and dry.   Excoriations on L lateral shoulder from fall     CBC:  Recent Labs   Lab 03/12/19  0643 03/14/19  0705 03/15/19  0722   WBC 9.30 7.90 9.70   RBC 4.36* 4.36* 4.33*   HGB 12.7* 12.7* 12.6*   HCT 38.7* 38.9* 38.2*    288 311   MCV 89 89 88   MCH 29.3 29.0 29.1   MCHC 33.0 32.6 33.0   BMP  Recent Labs   Lab 03/12/19  0643 03/14/19  0705 03/15/19  0722   * 133* 125*   * 132* 135*   K 4.2 4.3 4.7    99 101   CO2 23 25 26   BUN 20 12 14   CREATININE 0.9 0.8 0.9   CALCIUM 9.0 8.9 9.3      Diagnostic Results:  Microbiology Results (last 7 days)     ** No results found for the last 168 hours. **         CT scan of the chest without contrast:  Acute fractures of the left 2nd through 8th ribs.  Edge to edge apposition of the lower ribs with bayonet shortening of the 5th rib.  There is  subcutaneous emphysema however a pneumothorax is not seen.  Three small bands of pulmonary contusion noted in the left upper lobe.  Other fractures are not identified.  Mediastinal hemorrhage or hematoma is not seen.    Left scapula x-ray:  Negative plain x-rays of the left scapula.  Acute fractures of the left 2nd 3rd 4th and 5th ribs with subcutaneous emphysema and possible pneumothorax.    Left shoulder x-ray:  Acute  fractures of the left 2nd 3rd 4th and 5th ribs with a possible small left apical pneumothorax and a small amount of subcutaneous emphysema.  A fracture of the scapula or of the shoulder itself is not seen.  Dislocation is not noted.     Left rib series:  Multiple displaced and angulated left rib fractures, similar in alignment comparing across modalities.  Pulmonary contusion in the left midlung zone.    Assessment/Plan:     * Multiple closed fractures of ribs of left side  Pulmonary contusion     2/2 fall from horse  Imaging + contusion, however -ve for PTX  Pain not controlled with prn pain meds.  Continue percocet with prn dilaudid for breakthrough  Continue Lidoderm patch.  Continue incentive spirometry.  Follow Dr. Garrett recommendations.  Patient is scheduled for rib plating today.      DAYANNA (generalized anxiety disorder)     Chronic problem, with acute decompensation  Increase clonopin to TID prn     Paroxysmal atrial fibrillation     S/p Ablation. Not on chronic anticoagulation therapy  Cardiac monitoring     Morbid obesity     Body mass index is 46.25 kg/m². Morbid obesity complicates all aspects of disease management from diagnostic modalities to treatment. Weight loss encouraged and health benefits explained to patient.      MANUELA on CPAP     Chronic issues  OK to use home CPAP.      Uncontrolled hypertension - better  Continue Norvasc and lisinopril.  Use intravenous hydralazine 10 mg every 2 hr as needed for systolic blood pressure above 160 mm of mercury.    VTE Risk Mitigation (From admission, onward)        Ordered     enoxaparin injection 40 mg  Every 12 hours      03/14/19 1022     IP VTE HIGH RISK PATIENT  Once      03/09/19 1830        Susan Toure MD  Department of Hospital Medicine   [facility]

## 2019-03-15 NOTE — PROGRESS NOTES
In bed, lying on right side.  Dilaudid given without relief of pain.  Very restless.  Spoke with Dr Martin, states he will be in to see pt and assess.  Wife and pt updated

## 2019-03-16 LAB
ANION GAP SERPL CALC-SCNC: 9 MMOL/L
BACTERIA #/AREA URNS HPF: ABNORMAL /HPF
BASOPHILS # BLD AUTO: 0 K/UL
BASOPHILS NFR BLD: 0 %
BASOPHILS NFR BLD: 0.1 %
BASOPHILS NFR BLD: 0.3 %
BILIRUB UR QL STRIP: ABNORMAL
BUN SERPL-MCNC: 13 MG/DL
CALCIUM SERPL-MCNC: 8.6 MG/DL
CHLORIDE SERPL-SCNC: 98 MMOL/L
CLARITY UR: CLEAR
CO2 SERPL-SCNC: 27 MMOL/L
COLOR UR: YELLOW
CREAT SERPL-MCNC: 0.8 MG/DL
DIFFERENTIAL METHOD: ABNORMAL
EOSINOPHIL # BLD AUTO: 0 K/UL
EOSINOPHIL NFR BLD: 0.1 %
EOSINOPHIL NFR BLD: 0.3 %
EOSINOPHIL NFR BLD: 0.3 %
ERYTHROCYTE [DISTWIDTH] IN BLOOD BY AUTOMATED COUNT: 14.6 %
ERYTHROCYTE [DISTWIDTH] IN BLOOD BY AUTOMATED COUNT: 14.6 %
ERYTHROCYTE [DISTWIDTH] IN BLOOD BY AUTOMATED COUNT: 14.9 %
EST. GFR  (AFRICAN AMERICAN): >60 ML/MIN/1.73 M^2
EST. GFR  (NON AFRICAN AMERICAN): >60 ML/MIN/1.73 M^2
GLUCOSE SERPL-MCNC: 105 MG/DL
GLUCOSE UR QL STRIP: NEGATIVE
HCT VFR BLD AUTO: 37.5 %
HCT VFR BLD AUTO: 37.6 %
HCT VFR BLD AUTO: 37.8 %
HGB BLD-MCNC: 12.5 G/DL
HGB BLD-MCNC: 12.5 G/DL
HGB BLD-MCNC: 12.7 G/DL
HGB UR QL STRIP: NEGATIVE
KETONES UR QL STRIP: ABNORMAL
LEUKOCYTE ESTERASE UR QL STRIP: ABNORMAL
LYMPHOCYTES # BLD AUTO: 0.9 K/UL
LYMPHOCYTES # BLD AUTO: 1 K/UL
LYMPHOCYTES # BLD AUTO: 1 K/UL
LYMPHOCYTES NFR BLD: 6.8 %
LYMPHOCYTES NFR BLD: 7.3 %
LYMPHOCYTES NFR BLD: 8.8 %
MCH RBC QN AUTO: 29.3 PG
MCH RBC QN AUTO: 29.5 PG
MCH RBC QN AUTO: 29.5 PG
MCHC RBC AUTO-ENTMCNC: 33.1 G/DL
MCHC RBC AUTO-ENTMCNC: 33.4 G/DL
MCHC RBC AUTO-ENTMCNC: 33.7 G/DL
MCV RBC AUTO: 87 FL
MCV RBC AUTO: 88 FL
MCV RBC AUTO: 89 FL
MICROSCOPIC COMMENT: ABNORMAL
MONOCYTES # BLD AUTO: 0.9 K/UL
MONOCYTES # BLD AUTO: 0.9 K/UL
MONOCYTES # BLD AUTO: 1.1 K/UL
MONOCYTES NFR BLD: 6.9 %
MONOCYTES NFR BLD: 7.2 %
MONOCYTES NFR BLD: 8 %
NEUTROPHILS # BLD AUTO: 10 K/UL
NEUTROPHILS # BLD AUTO: 13.3 K/UL
NEUTROPHILS # BLD AUTO: 9.8 K/UL
NEUTROPHILS NFR BLD: 82.8 %
NEUTROPHILS NFR BLD: 84.9 %
NEUTROPHILS NFR BLD: 86.2 %
NITRITE UR QL STRIP: POSITIVE
PH UR STRIP: 5 [PH] (ref 5–8)
PLATELET # BLD AUTO: 274 K/UL
PLATELET # BLD AUTO: 294 K/UL
PLATELET # BLD AUTO: 302 K/UL
PMV BLD AUTO: 7.6 FL
PMV BLD AUTO: 7.6 FL
PMV BLD AUTO: 7.7 FL
POCT GLUCOSE: 162 MG/DL (ref 70–110)
POTASSIUM SERPL-SCNC: 4.6 MMOL/L
PROT UR QL STRIP: ABNORMAL
RBC # BLD AUTO: 4.24 M/UL
RBC # BLD AUTO: 4.27 M/UL
RBC # BLD AUTO: 4.3 M/UL
SODIUM SERPL-SCNC: 134 MMOL/L
SP GR UR STRIP: 1.02 (ref 1–1.03)
SQUAMOUS #/AREA URNS HPF: 2 /HPF
URN SPEC COLLECT METH UR: ABNORMAL
UROBILINOGEN UR STRIP-ACNC: ABNORMAL EU/DL
WBC # BLD AUTO: 11.8 K/UL
WBC # BLD AUTO: 11.9 K/UL
WBC # BLD AUTO: 15.4 K/UL
WBC #/AREA URNS HPF: 5 /HPF (ref 0–5)

## 2019-03-16 PROCEDURE — 63600175 PHARM REV CODE 636 W HCPCS: Performed by: INTERNAL MEDICINE

## 2019-03-16 PROCEDURE — 25000003 PHARM REV CODE 250: Performed by: INTERNAL MEDICINE

## 2019-03-16 PROCEDURE — 63600175 PHARM REV CODE 636 W HCPCS: Performed by: THORACIC SURGERY (CARDIOTHORACIC VASCULAR SURGERY)

## 2019-03-16 PROCEDURE — 36415 COLL VENOUS BLD VENIPUNCTURE: CPT

## 2019-03-16 PROCEDURE — 94799 UNLISTED PULMONARY SVC/PX: CPT

## 2019-03-16 PROCEDURE — 20000000 HC ICU ROOM

## 2019-03-16 PROCEDURE — 25000003 PHARM REV CODE 250: Performed by: NURSE PRACTITIONER

## 2019-03-16 PROCEDURE — 81000 URINALYSIS NONAUTO W/SCOPE: CPT

## 2019-03-16 PROCEDURE — 85025 COMPLETE CBC W/AUTO DIFF WBC: CPT | Mod: 91

## 2019-03-16 PROCEDURE — 80048 BASIC METABOLIC PNL TOTAL CA: CPT

## 2019-03-16 PROCEDURE — 94761 N-INVAS EAR/PLS OXIMETRY MLT: CPT

## 2019-03-16 PROCEDURE — 63600175 PHARM REV CODE 636 W HCPCS: Performed by: NURSE PRACTITIONER

## 2019-03-16 RX ORDER — KETOROLAC TROMETHAMINE 30 MG/ML
30 INJECTION, SOLUTION INTRAMUSCULAR; INTRAVENOUS ONCE
Status: COMPLETED | OUTPATIENT
Start: 2019-03-16 | End: 2019-03-16

## 2019-03-16 RX ORDER — KETOROLAC TROMETHAMINE 30 MG/ML
15 INJECTION, SOLUTION INTRAMUSCULAR; INTRAVENOUS EVERY 6 HOURS
Status: COMPLETED | OUTPATIENT
Start: 2019-03-17 | End: 2019-03-18

## 2019-03-16 RX ADMIN — OXYCODONE HYDROCHLORIDE 5 MG: 10 TABLET ORAL at 12:03

## 2019-03-16 RX ADMIN — ACETAMINOPHEN 650 MG: 325 TABLET ORAL at 07:03

## 2019-03-16 RX ADMIN — OXYCODONE HYDROCHLORIDE AND ACETAMINOPHEN 1 TABLET: 10; 325 TABLET ORAL at 06:03

## 2019-03-16 RX ADMIN — ENOXAPARIN SODIUM 40 MG: 100 INJECTION SUBCUTANEOUS at 09:03

## 2019-03-16 RX ADMIN — OXYCODONE HYDROCHLORIDE 15 MG: 10 TABLET ORAL at 06:03

## 2019-03-16 RX ADMIN — SODIUM CHLORIDE 1000 ML: 0.9 INJECTION, SOLUTION INTRAVENOUS at 10:03

## 2019-03-16 RX ADMIN — HYDROMORPHONE HYDROCHLORIDE 1 MG: 2 INJECTION, SOLUTION INTRAMUSCULAR; INTRAVENOUS; SUBCUTANEOUS at 09:03

## 2019-03-16 RX ADMIN — SENNOSIDES AND DOCUSATE SODIUM 1 TABLET: 8.6; 5 TABLET ORAL at 09:03

## 2019-03-16 RX ADMIN — LISINOPRIL 20 MG: 10 TABLET ORAL at 09:03

## 2019-03-16 RX ADMIN — OXYCODONE HYDROCHLORIDE 10 MG: 10 TABLET ORAL at 10:03

## 2019-03-16 RX ADMIN — AMLODIPINE BESYLATE 5 MG: 5 TABLET ORAL at 09:03

## 2019-03-16 RX ADMIN — VANCOMYCIN HYDROCHLORIDE 1500 MG: 1 INJECTION, POWDER, LYOPHILIZED, FOR SOLUTION INTRAVENOUS at 10:03

## 2019-03-16 RX ADMIN — KETOROLAC TROMETHAMINE 30 MG: 30 INJECTION, SOLUTION INTRAMUSCULAR at 06:03

## 2019-03-16 RX ADMIN — OXYCODONE HYDROCHLORIDE 15 MG: 10 TABLET ORAL at 10:03

## 2019-03-16 RX ADMIN — OXYCODONE HYDROCHLORIDE AND ACETAMINOPHEN 1 TABLET: 10; 325 TABLET ORAL at 03:03

## 2019-03-16 RX ADMIN — HYDROMORPHONE HYDROCHLORIDE 1 MG: 2 INJECTION, SOLUTION INTRAMUSCULAR; INTRAVENOUS; SUBCUTANEOUS at 04:03

## 2019-03-16 RX ADMIN — CLONAZEPAM 1 MG: 1 TABLET ORAL at 04:03

## 2019-03-16 RX ADMIN — PIPERACILLIN SODIUM AND TAZOBACTAM SODIUM 4.5 G: 4; .5 INJECTION, POWDER, LYOPHILIZED, FOR SOLUTION INTRAVENOUS at 10:03

## 2019-03-16 RX ADMIN — CLONAZEPAM 1 MG: 1 TABLET ORAL at 10:03

## 2019-03-16 RX ADMIN — LIDOCAINE 1 PATCH: 50 PATCH TOPICAL at 09:03

## 2019-03-16 RX ADMIN — CLONAZEPAM 1 MG: 1 TABLET ORAL at 09:03

## 2019-03-16 RX ADMIN — HYDROMORPHONE HYDROCHLORIDE 1 MG: 2 INJECTION, SOLUTION INTRAMUSCULAR; INTRAVENOUS; SUBCUTANEOUS at 12:03

## 2019-03-16 NOTE — NURSING
Drowsy, easily aroused. Respirations unlabored. Left CT intact to pleuravac sx. Serosang drainage. NICHOLE intact with bloody drainage. Assisted to reposition. Frustrated that he cant get comfortable and that he hasn't had anything to eat yet. Reassurance given. Informed that I would call MD regarding diet,if they do not make rounds soon. Call bell at side.

## 2019-03-16 NOTE — PLAN OF CARE
Problem: Adult Inpatient Plan of Care  Goal: Plan of Care Review  Outcome: Ongoing (interventions implemented as appropriate)  No acute events overnight.  Pt received from recovery around 2100 still groggy but oriented.  Pt requested pain medication, meds were administered as ordered.  No order fro PCA present and Recovery nurse wasted what was in the pump.  Pt requests removal of catheter, to be able to get up and walk around, and to have something to eat.  Pt was reminded of recent procedure and current status post op as well as need to keep cordova overnight, remain bed bound for the night, and reason for NPO order.  All medications administered as ordered, family updated on Pt status.  Pt currently sleeping, will continue to monitor.

## 2019-03-16 NOTE — PLAN OF CARE
Report to Lucille. Patient in restraints, pulled out lines upon arrival to ICU. Pain 5-7, agitated, thrashing about. Controlled pain, vs stable, calmed down. Dressing to left chest, tubes and drains dry intact. [place] to gravity, SCD's. Family present to visit with patient

## 2019-03-16 NOTE — PROGRESS NOTES
[facility] Hospital Medicine  Progress Note    Patient Name: Bird Feng  MRN: [id number]  Patient Class: IP- Inpatient   Admission Date: 3/9/2019  Length of Stay: 7 days  Attending Physician: Manda Riggs MD  Primary Care Provider: Jared Villa MD        Subjective:     Principal Problem:Multiple closed fractures of ribs of left side    HPI:  54 y.o. male  has a past medical history of Atrial fibrillation (2006), Atrial fibrillation, Hypertension, and Sleep apnea who presents to the ED via EMS for evaluation of left shoulder pain. Pt reports being in his normal state of health until just prior to arrival when he began experiencing severe L shoulder pain after falling from horse. In the ED pt was treated with multiple doses of IV dilaudid before experiencing any relief. Imaging preformed in the ED showed multiple rib fractures. Given significant pain medication requirement Hospital medicine was consulted for admission.     Other than presenting compliant pt reports doing well. Denies prior complaints.     Hospital Course:  No notes on file    Interval History:  The patient is doing okay except for severe pain in the left chest wall.  No increased shortness of breath or cough.  He is tolerating the diet.  He has not had a bowel movement in several days    Review of Systems   Respiratory: Negative for cough, shortness of breath and wheezing.    Cardiovascular: Negative for chest pain, palpitations and leg swelling.   Gastrointestinal: Negative for abdominal distention, abdominal pain, diarrhea and nausea.   Genitourinary: Negative for difficulty urinating, flank pain and hematuria.   Musculoskeletal: Negative for gait problem and joint swelling.   Neurological: Negative for tremors, weakness, light-headedness and headaches.   Psychiatric/Behavioral: Negative for agitation, behavioral problems and confusion.     Objective:     Vital Signs (Most Recent):  Temp: 97.9 °F (36.6 °C) (03/16/19  0745)  Pulse: 96 (03/16/19 1000)  Resp: 14 (03/16/19 1000)  BP: (!) 155/63 (03/16/19 1000)  SpO2: (!) 94 % (03/16/19 1000) Vital Signs (24h Range):  Temp:  [97.3 °F (36.3 °C)-98.9 °F (37.2 °C)] 97.9 °F (36.6 °C)  Pulse:  [] 96  Resp:  [14-43] 14  SpO2:  [94 %-100 %] 94 %  BP: (107-182)/() 155/63     Weight: (!) 186.8 kg (411 lb 13.1 oz)  Body mass index is 51.47 kg/m².    Intake/Output Summary (Last 24 hours) at 3/16/2019 1318  Last data filed at 3/16/2019 1245  Gross per 24 hour   Intake 3800 ml   Output 6977 ml   Net -3177 ml      Physical Exam   Constitutional: He is oriented to person, place, and time.   Cardiovascular: Normal rate, regular rhythm, normal heart sounds and intact distal pulses.   Pulmonary/Chest: Effort normal and breath sounds normal. No respiratory distress. He has no wheezes.   Chest tube on the left   Abdominal: Soft. Bowel sounds are normal. He exhibits no distension. There is no tenderness.   Musculoskeletal: Normal range of motion. He exhibits no edema or tenderness.   Neurological: He is alert and oriented to person, place, and time. He displays normal reflexes. No cranial nerve deficit or sensory deficit.   Skin: Skin is warm and dry. Capillary refill takes less than 2 seconds. No rash noted.   Psychiatric: He has a normal mood and affect. His behavior is normal. Thought content normal.   Nursing note and vitals reviewed.      Significant Labs:   BMP:   Recent Labs   Lab 03/16/19  0326      *   K 4.6   CL 98   CO2 27   BUN 13   CREATININE 0.8   CALCIUM 8.6*     CBC:   Recent Labs   Lab 03/15/19  0722 03/16/19  0119 03/16/19  0326   WBC 9.70 15.40* 11.80  11.90   HGB 12.6* 12.7* 12.5*  12.5*   HCT 38.2* 37.6* 37.5*  37.8*    274 294  302     Assessment and plan          * Multiple closed fractures of ribs of left side  Pulmonary contusion     Status post rib plating  Patient is doing well post surgery and will be transferred to the floor if okay with  Dr. Garrett      DAYANNA (generalized anxiety disorder)     Chronic problem, with acute decompensation  Increase clonopin to TID prn      Paroxysmal atrial fibrillation     S/p Ablation. Not on chronic anticoagulation therapy  Cardiac monitoring      Morbid obesity     Body mass index is 46.25 kg/m². Morbid obesity complicates all aspects of disease management from diagnostic modalities to treatment. Weight loss encouraged and health benefits explained to patient.      MANUELA on CPAP     Chronic issues  OK to use home CPAP.       Uncontrolled hypertension -  Continue Norvasc and lisinopril.  Use intravenous hydralazine 10 mg every 2 hr as needed for systolic blood pressure above 160 mm of mercury.            VTE Risk Mitigation (From admission, onward)         Ordered       enoxaparin injection 40 mg  Every 12 hours      03/14/19 1022       IP VTE HIGH RISK PATIENT  Once      03/09/19 1830                          VTE Risk Mitigation (From admission, onward)        Ordered     enoxaparin injection 40 mg  Every 12 hours      03/14/19 1022     IP VTE HIGH RISK PATIENT  Once      03/09/19 1830          Critical care time spent on the evaluation and treatment of severe organ dysfunction, review of pertinent labs and imaging studies, discussions with consulting providers and discussions with patient/family: 30 minutes.    Manda Riggs MD  Department of Hospital Medicine   [facility]

## 2019-03-16 NOTE — ANESTHESIA POSTPROCEDURE EVALUATION
"Anesthesia Post Evaluation    Patient: Bird Feng    Procedure(s) Performed: Procedure(s) (LRB):  ORIF, FRACTURE, RIBS (Left)    Final Anesthesia Type: general  Patient location during evaluation: ICU  Patient participation: Yes- Able to Participate  Level of consciousness: awake  Post-procedure vital signs: reviewed and stable  Pain management: adequate  Airway patency: patent  PONV status at discharge: No PONV  Anesthetic complications: no      Cardiovascular status: hemodynamically stable  Respiratory status: unassisted, spontaneous ventilation and face mask  Hydration status: euvolemic  Follow-up not needed.        Visit Vitals  BP (!) 172/79   Pulse 107   Temp 37.1 °C (98.8 °F)   Resp 18   Ht 6' 3" (1.905 m)   Wt (!) 167.8 kg (370 lb)   SpO2 99%   BMI 46.25 kg/m²       Pain/Hong Score: Pain Rating Prior to Med Admin: 3 (3/15/2019  8:25 PM)  Pain Rating Post Med Admin: 5 (3/15/2019  5:52 AM)  Hong Score: 8 (3/15/2019  8:25 PM)        "

## 2019-03-16 NOTE — PROGRESS NOTES
Progress Note  Cardiothoracic Surgery    Admit Date: 3/9/2019  Post-operative Day: 1 Day Post-Op  Hospital Day: 8    SUBJECTIVE:Feels much better today. Still uncomfortable in certain positions       Follow-up For: Procedure(s) (LRB):  ORIF, FRACTURE, RIBS (Left)    Scheduled Meds:   amLODIPine  5 mg Oral Daily    enoxaparin  40 mg Subcutaneous Q12H    lidocaine  1 patch Transdermal Daily    lisinopril  20 mg Oral Daily    senna-docusate 8.6-50 mg  1 tablet Oral BID     Infusions/Drips:  PRN Meds: acetaminophen, aluminum-magnesium hydroxide-simethicone, clonazePAM, dextrose 50%, dextrose 50%, diphenhydrAMINE, diphenhydrAMINE, glucagon (human recombinant), glucose, glucose, hydrALAZINE, HYDROmorphone, HYDROmorphone, insulin aspart U-100, lactulose, magnesium oxide, magnesium oxide, naloxone, ondansetron, ondansetron, oxyCODONE, potassium chloride 10%, potassium chloride 10%, promethazine (PHENERGAN) IVPB, promethazine (PHENERGAN) IVPB, sodium chloride 0.9%, sodium chloride 0.9%    Review of patient's allergies indicates:  No Known Allergies    OBJECTIVE:     Vital Signs (Most Recent)  Temp: 97.9 °F (36.6 °C) (03/16/19 0745)  Pulse: 96 (03/16/19 1000)  Resp: 14 (03/16/19 1000)  BP: (!) 155/63 (03/16/19 1000)  SpO2: (!) 94 % (03/16/19 1000)    Admission Weight: (!) 167.8 kg (370 lb) (03/09/19 1425)   Most Recent Weight: (!) 186.8 kg (411 lb 13.1 oz) (03/16/19 0600)    Vital Signs Range (Last 24H):  Temp:  [97.3 °F (36.3 °C)-98.9 °F (37.2 °C)]   Pulse:  []   Resp:  [14-43]   BP: (107-182)/()   SpO2:  [94 %-100 %]     I & O (Last 24H):    Intake/Output Summary (Last 24 hours) at 3/16/2019 1403  Last data filed at 3/16/2019 1245  Gross per 24 hour   Intake 3800 ml   Output 6677 ml   Net -2877 ml     Physical Exam:  NAD  BS Decreased on left  CV RRR  Abd soft  Ext +edema    Wound/Incision:  Clean and dry    Drains decreasing output, more serous    Laboratory:  CBC:   Recent Labs   Lab 03/16/19  0326   WBC  11.80  11.90   RBC 4.27*  4.24*   HGB 12.5*  12.5*   HCT 37.5*  37.8*     302   MCV 88  89   MCH 29.3  29.5   MCHC 33.4  33.1     BMP:   Recent Labs   Lab 03/16/19  0326      *   K 4.6   CL 98   CO2 27   BUN 13   CREATININE 0.8   CALCIUM 8.6*       Diagnostic Results:  X-Ray: Reviewed    ASSESSMENT/PLAN:     Assessment: Doing well.    Plan: Watch drains another 24 hours            OOB to recliner            Discussed importance of increasing level of activity with patient at bedside along with patients wife and nurse

## 2019-03-16 NOTE — NURSING
Asking for more iv pain med. Informed too early and instructed on need for movement and deep breaths.  Voiced understanding. Oxycodone 10 mg po given. Did not want to take the full dose at this time. Call bell at side.

## 2019-03-16 NOTE — NURSING
"Frequent c/o incisional pain, "10"  Dilaudid 1 mg ivp given with some relief. States pain now about a 2.  Informed that pain may not fully subside but should be tolerable. Encouraged deep breaths. Wife at side.   "

## 2019-03-16 NOTE — NURSING
0920 Dr. Riggs on unit and updated. Diet ordered, pain meds adjusted, and transfer order received.   1015 Dr. Garrett updated. Wants pt to stay in ICU until he makes rounds.

## 2019-03-16 NOTE — PROGRESS NOTES
03/16/19 0820   Patient Assessment/Suction   Level of Consciousness (AVPU) alert   PRE-TX-O2   O2 Device (Oxygen Therapy) room air   SpO2 97 %   Pulse Oximetry Type Continuous   $ Pulse Oximetry - Multiple Charge Pulse Oximetry - Multiple   Pulse 97   Resp 17   /62   Incentive Spirometer   $ Incentive Spirometer Charges done with encouragement;postop instruction   Incentive Spirometer Predicted Level (mL) 3350   Administration (IS) instruction provided, initial;mouthpiece   Number of Repetitions (IS) 8   Level Incentive Spirometer (mL) 1500   Patient Tolerance (IS) fair

## 2019-03-16 NOTE — SUBJECTIVE & OBJECTIVE
Interval History:  The patient is doing okay except for severe pain in the left chest wall.  No increased shortness of breath or cough.  He is tolerating the diet.  He has not had a bowel movement in several days    Review of Systems   Respiratory: Negative for cough, shortness of breath and wheezing.    Cardiovascular: Negative for chest pain, palpitations and leg swelling.   Gastrointestinal: Negative for abdominal distention, abdominal pain, diarrhea and nausea.   Genitourinary: Negative for difficulty urinating, flank pain and hematuria.   Musculoskeletal: Negative for gait problem and joint swelling.   Neurological: Negative for tremors, weakness, light-headedness and headaches.   Psychiatric/Behavioral: Negative for agitation, behavioral problems and confusion.     Objective:     Vital Signs (Most Recent):  Temp: 97.9 °F (36.6 °C) (03/16/19 0745)  Pulse: 96 (03/16/19 1000)  Resp: 14 (03/16/19 1000)  BP: (!) 155/63 (03/16/19 1000)  SpO2: (!) 94 % (03/16/19 1000) Vital Signs (24h Range):  Temp:  [97.3 °F (36.3 °C)-98.9 °F (37.2 °C)] 97.9 °F (36.6 °C)  Pulse:  [] 96  Resp:  [14-43] 14  SpO2:  [94 %-100 %] 94 %  BP: (107-182)/() 155/63     Weight: (!) 186.8 kg (411 lb 13.1 oz)  Body mass index is 51.47 kg/m².    Intake/Output Summary (Last 24 hours) at 3/16/2019 1318  Last data filed at 3/16/2019 1245  Gross per 24 hour   Intake 3800 ml   Output 6977 ml   Net -3177 ml      Physical Exam   Constitutional: He is oriented to person, place, and time.   Cardiovascular: Normal rate, regular rhythm, normal heart sounds and intact distal pulses.   Pulmonary/Chest: Effort normal and breath sounds normal. No respiratory distress. He has no wheezes.   Chest tube on the left   Abdominal: Soft. Bowel sounds are normal. He exhibits no distension. There is no tenderness.   Musculoskeletal: Normal range of motion. He exhibits no edema or tenderness.   Neurological: He is alert and oriented to person, place, and time.  He displays normal reflexes. No cranial nerve deficit or sensory deficit.   Skin: Skin is warm and dry. Capillary refill takes less than 2 seconds. No rash noted.   Psychiatric: He has a normal mood and affect. His behavior is normal. Thought content normal.   Nursing note and vitals reviewed.      Significant Labs:   BMP:   Recent Labs   Lab 03/16/19  0326      *   K 4.6   CL 98   CO2 27   BUN 13   CREATININE 0.8   CALCIUM 8.6*     CBC:   Recent Labs   Lab 03/15/19  0722 03/16/19  0119 03/16/19  0326   WBC 9.70 15.40* 11.80  11.90   HGB 12.6* 12.7* 12.5*  12.5*   HCT 38.2* 37.6* 37.5*  37.8*    274 294  302     Assessment and plan      * Multiple closed fractures of ribs of left side  Pulmonary contusion     Status post rib plating  Patient is doing well post surgery and will be transferred to the floor if okay with Dr. Garrett      DAYANNA (generalized anxiety disorder)     Chronic problem, with acute decompensation  Increase clonopin to TID prn      Paroxysmal atrial fibrillation     S/p Ablation. Not on chronic anticoagulation therapy  Cardiac monitoring      Morbid obesity     Body mass index is 46.25 kg/m². Morbid obesity complicates all aspects of disease management from diagnostic modalities to treatment. Weight loss encouraged and health benefits explained to patient.      MANUELA on CPAP     Chronic issues  OK to use home CPAP.       Uncontrolled hypertension -  Continue Norvasc and lisinopril.  Use intravenous hydralazine 10 mg every 2 hr as needed for systolic blood pressure above 160 mm of mercury.            VTE Risk Mitigation (From admission, onward)         Ordered       enoxaparin injection 40 mg  Every 12 hours      03/14/19 1022       IP VTE HIGH RISK PATIENT  Once      03/09/19 1830

## 2019-03-17 LAB
ALBUMIN SERPL BCP-MCNC: 2.3 G/DL
ALP SERPL-CCNC: 65 U/L
ALT SERPL W/O P-5'-P-CCNC: 60 U/L
ANION GAP SERPL CALC-SCNC: 8 MMOL/L
ANION GAP SERPL CALC-SCNC: 8 MMOL/L
APTT BLDCRRT: 30.3 SEC
AST SERPL-CCNC: 64 U/L
BASOPHILS # BLD AUTO: 0 K/UL
BASOPHILS # BLD AUTO: 0.1 K/UL
BASOPHILS NFR BLD: 0.2 %
BASOPHILS NFR BLD: 0.7 %
BILIRUB SERPL-MCNC: 0.8 MG/DL
BUN SERPL-MCNC: 17 MG/DL
BUN SERPL-MCNC: 20 MG/DL
CALCIUM SERPL-MCNC: 8.1 MG/DL
CALCIUM SERPL-MCNC: 8.4 MG/DL
CHLORIDE SERPL-SCNC: 98 MMOL/L
CHLORIDE SERPL-SCNC: 99 MMOL/L
CO2 SERPL-SCNC: 26 MMOL/L
CO2 SERPL-SCNC: 26 MMOL/L
CREAT SERPL-MCNC: 1.1 MG/DL
CREAT SERPL-MCNC: 1.2 MG/DL
DIFFERENTIAL METHOD: ABNORMAL
DIFFERENTIAL METHOD: ABNORMAL
EOSINOPHIL # BLD AUTO: 0.2 K/UL
EOSINOPHIL # BLD AUTO: 0.2 K/UL
EOSINOPHIL NFR BLD: 1.1 %
EOSINOPHIL NFR BLD: 1.7 %
ERYTHROCYTE [DISTWIDTH] IN BLOOD BY AUTOMATED COUNT: 14.6 %
ERYTHROCYTE [DISTWIDTH] IN BLOOD BY AUTOMATED COUNT: 14.9 %
EST. GFR  (AFRICAN AMERICAN): >60 ML/MIN/1.73 M^2
EST. GFR  (AFRICAN AMERICAN): >60 ML/MIN/1.73 M^2
EST. GFR  (NON AFRICAN AMERICAN): >60 ML/MIN/1.73 M^2
EST. GFR  (NON AFRICAN AMERICAN): >60 ML/MIN/1.73 M^2
GLUCOSE SERPL-MCNC: 112 MG/DL
GLUCOSE SERPL-MCNC: 178 MG/DL
HCT VFR BLD AUTO: 35.6 %
HCT VFR BLD AUTO: 36 %
HGB BLD-MCNC: 11.9 G/DL
HGB BLD-MCNC: 11.9 G/DL
INR PPP: 1.1
LACTATE SERPL-SCNC: 1 MMOL/L
LACTATE SERPL-SCNC: 1.9 MMOL/L
LYMPHOCYTES # BLD AUTO: 1.4 K/UL
LYMPHOCYTES # BLD AUTO: 1.7 K/UL
LYMPHOCYTES NFR BLD: 11.7 %
LYMPHOCYTES NFR BLD: 9.4 %
MAGNESIUM SERPL-MCNC: 1.8 MG/DL
MCH RBC QN AUTO: 29.1 PG
MCH RBC QN AUTO: 29.2 PG
MCHC RBC AUTO-ENTMCNC: 33.1 G/DL
MCHC RBC AUTO-ENTMCNC: 33.3 G/DL
MCV RBC AUTO: 87 FL
MCV RBC AUTO: 88 FL
MONOCYTES # BLD AUTO: 1.2 K/UL
MONOCYTES # BLD AUTO: 1.5 K/UL
MONOCYTES NFR BLD: 10.4 %
MONOCYTES NFR BLD: 8 %
NEUTROPHILS # BLD AUTO: 10.7 K/UL
NEUTROPHILS # BLD AUTO: 12 K/UL
NEUTROPHILS NFR BLD: 75.5 %
NEUTROPHILS NFR BLD: 81.3 %
PHOSPHATE SERPL-MCNC: 3.5 MG/DL
PLATELET # BLD AUTO: 271 K/UL
PLATELET # BLD AUTO: 281 K/UL
PMV BLD AUTO: 7.2 FL
PMV BLD AUTO: 7.3 FL
POCT GLUCOSE: 103 MG/DL (ref 70–110)
POCT GLUCOSE: 137 MG/DL (ref 70–110)
POTASSIUM SERPL-SCNC: 3.8 MMOL/L
POTASSIUM SERPL-SCNC: 4.2 MMOL/L
PROT SERPL-MCNC: 5.1 G/DL
PROTHROMBIN TIME: 10.9 SEC
RBC # BLD AUTO: 4.07 M/UL
RBC # BLD AUTO: 4.09 M/UL
SODIUM SERPL-SCNC: 132 MMOL/L
SODIUM SERPL-SCNC: 133 MMOL/L
TROPONIN I SERPL DL<=0.01 NG/ML-MCNC: <0.006 NG/ML
WBC # BLD AUTO: 14.2 K/UL
WBC # BLD AUTO: 14.8 K/UL

## 2019-03-17 PROCEDURE — 63600175 PHARM REV CODE 636 W HCPCS: Performed by: INTERNAL MEDICINE

## 2019-03-17 PROCEDURE — 83605 ASSAY OF LACTIC ACID: CPT

## 2019-03-17 PROCEDURE — 85730 THROMBOPLASTIN TIME PARTIAL: CPT

## 2019-03-17 PROCEDURE — 25000003 PHARM REV CODE 250: Performed by: INTERNAL MEDICINE

## 2019-03-17 PROCEDURE — 85025 COMPLETE CBC W/AUTO DIFF WBC: CPT

## 2019-03-17 PROCEDURE — 63600175 PHARM REV CODE 636 W HCPCS: Performed by: THORACIC SURGERY (CARDIOTHORACIC VASCULAR SURGERY)

## 2019-03-17 PROCEDURE — 94799 UNLISTED PULMONARY SVC/PX: CPT

## 2019-03-17 PROCEDURE — 87040 BLOOD CULTURE FOR BACTERIA: CPT

## 2019-03-17 PROCEDURE — 36415 COLL VENOUS BLD VENIPUNCTURE: CPT

## 2019-03-17 PROCEDURE — 20000000 HC ICU ROOM

## 2019-03-17 PROCEDURE — 94761 N-INVAS EAR/PLS OXIMETRY MLT: CPT

## 2019-03-17 PROCEDURE — 63600175 PHARM REV CODE 636 W HCPCS: Performed by: NURSE PRACTITIONER

## 2019-03-17 PROCEDURE — 80048 BASIC METABOLIC PNL TOTAL CA: CPT

## 2019-03-17 PROCEDURE — 80053 COMPREHEN METABOLIC PANEL: CPT

## 2019-03-17 PROCEDURE — 83735 ASSAY OF MAGNESIUM: CPT

## 2019-03-17 PROCEDURE — 85610 PROTHROMBIN TIME: CPT

## 2019-03-17 PROCEDURE — 25000003 PHARM REV CODE 250: Performed by: NURSE PRACTITIONER

## 2019-03-17 PROCEDURE — 99900035 HC TECH TIME PER 15 MIN (STAT)

## 2019-03-17 PROCEDURE — 84100 ASSAY OF PHOSPHORUS: CPT

## 2019-03-17 PROCEDURE — 84484 ASSAY OF TROPONIN QUANT: CPT

## 2019-03-17 RX ORDER — ACETAMINOPHEN 500 MG
1000 TABLET ORAL EVERY 6 HOURS PRN
Status: DISCONTINUED | OUTPATIENT
Start: 2019-03-17 | End: 2019-03-20 | Stop reason: HOSPADM

## 2019-03-17 RX ORDER — IBUPROFEN 600 MG/1
600 TABLET ORAL EVERY 6 HOURS PRN
Status: DISCONTINUED | OUTPATIENT
Start: 2019-03-17 | End: 2019-03-20 | Stop reason: HOSPADM

## 2019-03-17 RX ADMIN — CLONAZEPAM 1 MG: 1 TABLET ORAL at 12:03

## 2019-03-17 RX ADMIN — IBUPROFEN 600 MG: 600 TABLET ORAL at 12:03

## 2019-03-17 RX ADMIN — OXYCODONE HYDROCHLORIDE 15 MG: 10 TABLET ORAL at 08:03

## 2019-03-17 RX ADMIN — MICONAZOLE NITRATE: 20 OINTMENT TOPICAL at 12:03

## 2019-03-17 RX ADMIN — PIPERACILLIN SODIUM AND TAZOBACTAM SODIUM 4.5 G: 4; .5 INJECTION, POWDER, LYOPHILIZED, FOR SOLUTION INTRAVENOUS at 05:03

## 2019-03-17 RX ADMIN — OXYCODONE HYDROCHLORIDE 15 MG: 10 TABLET ORAL at 06:03

## 2019-03-17 RX ADMIN — KETOROLAC TROMETHAMINE: 30 INJECTION, SOLUTION INTRAMUSCULAR at 12:03

## 2019-03-17 RX ADMIN — ACETAMINOPHEN 1000 MG: 500 TABLET ORAL at 12:03

## 2019-03-17 RX ADMIN — OXYCODONE HYDROCHLORIDE 15 MG: 10 TABLET ORAL at 02:03

## 2019-03-17 RX ADMIN — CLONAZEPAM 1 MG: 1 TABLET ORAL at 10:03

## 2019-03-17 RX ADMIN — KETOROLAC TROMETHAMINE 15 MG: 30 INJECTION, SOLUTION INTRAMUSCULAR at 05:03

## 2019-03-17 RX ADMIN — SENNOSIDES AND DOCUSATE SODIUM 1 TABLET: 8.6; 5 TABLET ORAL at 08:03

## 2019-03-17 RX ADMIN — ENOXAPARIN SODIUM 40 MG: 100 INJECTION SUBCUTANEOUS at 08:03

## 2019-03-17 RX ADMIN — ENOXAPARIN SODIUM 40 MG: 100 INJECTION SUBCUTANEOUS at 09:03

## 2019-03-17 RX ADMIN — OXYCODONE HYDROCHLORIDE 15 MG: 10 TABLET ORAL at 10:03

## 2019-03-17 RX ADMIN — CLONAZEPAM 1 MG: 1 TABLET ORAL at 05:03

## 2019-03-17 RX ADMIN — KETOROLAC TROMETHAMINE 15 MG: 30 INJECTION, SOLUTION INTRAMUSCULAR at 12:03

## 2019-03-17 RX ADMIN — MICONAZOLE NITRATE: 20 OINTMENT TOPICAL at 09:03

## 2019-03-17 RX ADMIN — PIPERACILLIN SODIUM AND TAZOBACTAM SODIUM 4.5 G: 4; .5 INJECTION, POWDER, LYOPHILIZED, FOR SOLUTION INTRAVENOUS at 01:03

## 2019-03-17 RX ADMIN — VANCOMYCIN HYDROCHLORIDE 1500 MG: 1 INJECTION, POWDER, LYOPHILIZED, FOR SOLUTION INTRAVENOUS at 05:03

## 2019-03-17 RX ADMIN — PIPERACILLIN SODIUM AND TAZOBACTAM SODIUM 4.5 G: 4; .5 INJECTION, POWDER, LYOPHILIZED, FOR SOLUTION INTRAVENOUS at 09:03

## 2019-03-17 RX ADMIN — LIDOCAINE 1 PATCH: 50 PATCH TOPICAL at 08:03

## 2019-03-17 RX ADMIN — HYDROMORPHONE HYDROCHLORIDE 1 MG: 2 INJECTION, SOLUTION INTRAMUSCULAR; INTRAVENOUS; SUBCUTANEOUS at 09:03

## 2019-03-17 NOTE — NURSING
Assisted OOB to chair. Respirations unlabored. CT and NICHOLE intact. Call bell at side. Wife at bedside.

## 2019-03-17 NOTE — SUBJECTIVE & OBJECTIVE
Interval History:  The patient is doing better today.  He has less pain.  He denies increased shortness of breath or cough.  No nausea or abdominal pain. He had a fever overnight    Review of Systems   Constitutional: Positive for fever. Negative for chills.   Respiratory: Negative for cough, shortness of breath and wheezing.    Cardiovascular: Negative for chest pain, palpitations and leg swelling.   Gastrointestinal: Negative for abdominal distention, abdominal pain, diarrhea and nausea.   Genitourinary: Negative for difficulty urinating, flank pain and hematuria.   Musculoskeletal: Negative for gait problem and joint swelling.   Neurological: Negative for tremors, weakness, light-headedness and headaches.   Psychiatric/Behavioral: Negative for agitation, behavioral problems and confusion.     Objective:     Vital Signs (Most Recent):  Temp: 98.9 °F (37.2 °C) (03/17/19 0400)  Pulse: 91 (03/17/19 1100)  Resp: 16 (03/17/19 1100)  BP: (!) 114/56 (03/17/19 1100)  SpO2: 98 % (03/17/19 1100) Vital Signs (24h Range):  Temp:  [98.6 °F (37 °C)-101.7 °F (38.7 °C)] 98.9 °F (37.2 °C)  Pulse:  [] 91  Resp:  [10-36] 16  SpO2:  [86 %-100 %] 98 %  BP: ()/() 114/56     Weight: (!) 196.2 kg (432 lb 8.7 oz)  Body mass index is 54.06 kg/m².    Intake/Output Summary (Last 24 hours) at 3/17/2019 1215  Last data filed at 3/17/2019 0600  Gross per 24 hour   Intake 840 ml   Output 4173 ml   Net -3333 ml      Physical Exam   Constitutional: He is oriented to person, place, and time.   Cardiovascular: Normal rate, regular rhythm, normal heart sounds and intact distal pulses.   Pulmonary/Chest: Effort normal and breath sounds normal. No respiratory distress. He has no wheezes.   Chest tube on the left side   Abdominal: Soft. Bowel sounds are normal. He exhibits no distension. There is no tenderness.   Musculoskeletal: Normal range of motion. He exhibits no edema or tenderness.   Neurological: He is alert and oriented to  person, place, and time. He displays normal reflexes. No cranial nerve deficit or sensory deficit.   Skin: Skin is warm and dry. Capillary refill takes less than 2 seconds. No rash noted.   Psychiatric: He has a normal mood and affect. His behavior is normal. Thought content normal.   Nursing note and vitals reviewed.      Significant Labs:   BMP:   Recent Labs   Lab 03/17/19  0100 03/17/19  0512   * 112*   * 133*   K 3.8 4.2   CL 98 99   CO2 26 26   BUN 17 20   CREATININE 1.1 1.2   CALCIUM 8.1* 8.4*   MG 1.8  --      CBC:   Recent Labs   Lab 03/16/19  0326 03/17/19  0100 03/17/19  0512   WBC 11.80  11.90 14.80* 14.20*   HGB 12.5*  12.5* 11.9* 11.9*   HCT 37.5*  37.8* 36.0* 35.6*     302 281 271       Significant Imaging: I have reviewed all pertinent imaging results/findings within the past 24 hours.   Assessment and plan              * Multiple closed fractures of ribs of left side  Pulmonary contusion     Status post rib plating  Patient is doing well post surgery and will be transferred to the floor if okay with Dr. Tami ALAN (generalized anxiety disorder)     Chronic problem, with acute decompensation  Increase clonopin to TID prn      Paroxysmal atrial fibrillation     S/p Ablation. Not on chronic anticoagulation therapy  Cardiac monitoring      Morbid obesity     Body mass index is 46.25 kg/m². Morbid obesity complicates all aspects of disease management from diagnostic modalities to treatment. Weight loss encouraged and health benefits explained to patient.      MANUELA on CPAP     Chronic issues  OK to use home CPAP.       Uncontrolled hypertension -  Continue Norvasc and lisinopril.  Use intravenous hydralazine 10 mg every 2 hr as needed for systolic blood pressure above 160 mm of mercury.                          VTE Risk Mitigation (From admission, onward)         Ordered       enoxaparin injection 40 mg  Every 12 hours      03/14/19 1022       IP VTE HIGH RISK PATIENT  Once       03/09/19 1830                                       VTE Risk Mitigation (From admission, onward)         Ordered       enoxaparin injection 40 mg  Every 12 hours      03/14/19 1022       IP VTE HIGH RISK PATIENT  Once      03/09/19 1830             Critical care time spent on the evaluation and treatment of severe organ dysfunction, review of pertinent labs and imaging studies, discussions with consulting providers and discussions with patient/family: 30 minutes.     Manda Riggs MD  Department of Hospital Medicine   [facility]

## 2019-03-17 NOTE — PLAN OF CARE
Problem: Adult Inpatient Plan of Care  Goal: Plan of Care Review  Outcome: Ongoing (interventions implemented as appropriate)  Pt sustained 101 Ax fever for first half of shift.  Tylenol administered as ordered and new orders placed when temp persisted.  Labs drawn, urine collected, bolus given all as ordered.  Pt was able to relax and sleep well throughout the night.  Both NICHOLE and chest tube output decreasing.  UO noted to be growing darker, MD aware urine contains infection as per urinalysis.  Pt wore bipap comfortably throughout the night and Pt wife allowed to stay to calm his anxiety and help facilitate movement.  Pt sucessfully ambulated to reclining chair and sat for hour before standing to be bathed.  Tolerated both well, will continue to monitor closely.

## 2019-03-17 NOTE — PROGRESS NOTES
Dr Garrett updated on outputs from chest tube and NICHOLE drain since 7pm 3/16/2019. Orders received to place chest tube to water seal; ok to transfer out of ICU; to PCU only. Dr Riggs to be updated.

## 2019-03-17 NOTE — PLAN OF CARE
Problem: Adult Inpatient Plan of Care  Goal: Plan of Care Review  Outcome: Ongoing (interventions implemented as appropriate)  Respirations unlabored. Poor cough effort. Reaches 1500 on IS. Left CT intact to pleuravac sx. Serosang drainage. NICHOLE intact with bloody drainage. Abdomen obese. [place]ton intact with adequate urine output. Bed in chair position today. Frequent c/o pain. Meds adjusted . Safety maintained.

## 2019-03-17 NOTE — CONSULTS
Bird Feng [id number] is a 54 y.o. male who had been consulted for vancomycin dosing.    Vancomycin has been discontinued.  Pharmacy consult for vancomycin dosing in no longer required.      Thank you for allowing us to participate in this patient's care.     Waldo Hernandez, KarenaD

## 2019-03-17 NOTE — PROGRESS NOTES
03/17/19 1250   Patient Assessment/Suction   Level of Consciousness (AVPU) alert   PRE-TX-O2   O2 Device (Oxygen Therapy) room air   SpO2 99 %   Pulse Oximetry Type Continuous   $ Pulse Oximetry - Multiple Charge Pulse Oximetry - Multiple   Pulse 101   Resp 18   Incentive Spirometer   $ Incentive Spirometer Charges done with encouragement   Administration (IS) instruction provided, follow-up   Number of Repetitions (IS) 10   Level Incentive Spirometer (mL) 2000   Patient Tolerance (IS) good

## 2019-03-17 NOTE — CONSULTS
Date: 3/17/2019   Bird Feng [id number] is a 54 y.o. male who has been consulted for vancomycin dosing.    The patient has the following labs:     Creatinine (mg/dl)  WBC Count  Serum creatinine: 1.2 mg/dL 03/17/19 0512  Estimated creatinine clearance: 123.4 mL/min  Lab Results  Component  Value  Date  WBC  14.20 (H)  03/17/2019    Current weight is (!) 186.8 kg (411 lb 13.1 oz)    Pt being treated with vancomycin for sepsis.    The patient will be started on vancomycin at a dose of 1500 mg every 8 hours. The vancomycin trough has been ordered for 03/17 at 2100.  Target goal is 15-20.     Patient will be followed by pharmacy for changes in renal function, toxicity, and efficacy.      Thank you for allowing us to participate in this patient's care.     Bonita Watters, KarenaD

## 2019-03-17 NOTE — PROGRESS NOTES
[facility] Medicine  Progress Note    Patient Name: Bird Feng  MRN: [id number]  Patient Class: IP- Inpatient   Admission Date: 3/9/2019  Length of Stay: 8 days  Attending Physician: Manda Riggs MD  Primary Care Provider: Jared Villa MD        Subjective:     Principal Problem:Multiple closed fractures of ribs of left side    HPI:  54 y.o. male  has a past medical history of Atrial fibrillation (2006), Atrial fibrillation, Hypertension, and Sleep apnea who presents to the ED via EMS for evaluation of left shoulder pain. Pt reports being in his normal state of health until just prior to arrival when he began experiencing severe L shoulder pain after falling from horse. In the ED pt was treated with multiple doses of IV dilaudid before experiencing any relief. Imaging preformed in the ED showed multiple rib fractures. Given significant pain medication requirement Hospital medicine was consulted for admission.     Other than presenting compliant pt reports doing well. Denies prior complaints.     Hospital Course:  No notes on file    Interval History:  The patient is doing better today.  He has less pain.  He denies increased shortness of breath or cough.  No nausea or abdominal pain. He had a fever overnight    Review of Systems   Constitutional: Positive for fever. Negative for chills.   Respiratory: Negative for cough, shortness of breath and wheezing.    Cardiovascular: Negative for chest pain, palpitations and leg swelling.   Gastrointestinal: Negative for abdominal distention, abdominal pain, diarrhea and nausea.   Genitourinary: Negative for difficulty urinating, flank pain and hematuria.   Musculoskeletal: Negative for gait problem and joint swelling.   Neurological: Negative for tremors, weakness, light-headedness and headaches.   Psychiatric/Behavioral: Negative for agitation, behavioral problems and confusion.     Objective:     Vital Signs (Most Recent):  Temp: 98.9  °F (37.2 °C) (03/17/19 0400)  Pulse: 91 (03/17/19 1100)  Resp: 16 (03/17/19 1100)  BP: (!) 114/56 (03/17/19 1100)  SpO2: 98 % (03/17/19 1100) Vital Signs (24h Range):  Temp:  [98.6 °F (37 °C)-101.7 °F (38.7 °C)] 98.9 °F (37.2 °C)  Pulse:  [] 91  Resp:  [10-36] 16  SpO2:  [86 %-100 %] 98 %  BP: ()/() 114/56     Weight: (!) 196.2 kg (432 lb 8.7 oz)  Body mass index is 54.06 kg/m².    Intake/Output Summary (Last 24 hours) at 3/17/2019 1215  Last data filed at 3/17/2019 0600  Gross per 24 hour   Intake 840 ml   Output 4173 ml   Net -3333 ml      Physical Exam   Constitutional: He is oriented to person, place, and time.   Cardiovascular: Normal rate, regular rhythm, normal heart sounds and intact distal pulses.   Pulmonary/Chest: Effort normal and breath sounds normal. No respiratory distress. He has no wheezes.   Chest tube on the left side   Abdominal: Soft. Bowel sounds are normal. He exhibits no distension. There is no tenderness.   Musculoskeletal: Normal range of motion. He exhibits no edema or tenderness.   Neurological: He is alert and oriented to person, place, and time. He displays normal reflexes. No cranial nerve deficit or sensory deficit.   Skin: Skin is warm and dry. Capillary refill takes less than 2 seconds. No rash noted.   Psychiatric: He has a normal mood and affect. His behavior is normal. Thought content normal.   Nursing note and vitals reviewed.      Significant Labs:   BMP:   Recent Labs   Lab 03/17/19  0100 03/17/19  0512   * 112*   * 133*   K 3.8 4.2   CL 98 99   CO2 26 26   BUN 17 20   CREATININE 1.1 1.2   CALCIUM 8.1* 8.4*   MG 1.8  --      CBC:   Recent Labs   Lab 03/16/19  0326 03/17/19  0100 03/17/19  0512   WBC 11.80  11.90 14.80* 14.20*   HGB 12.5*  12.5* 11.9* 11.9*   HCT 37.5*  37.8* 36.0* 35.6*     302 281 271       Significant Imaging: I have reviewed all pertinent imaging results/findings within the past 24 hours.   Assessment and  plan              * Multiple closed fractures of ribs of left side  Pulmonary contusion     Status post rib plating  Patient is doing well post surgery and will be transferred to the floor if okay with Dr. Garrett      DAYANNA (generalized anxiety disorder)     Chronic problem, with acute decompensation  Increase clonopin to TID prn      Paroxysmal atrial fibrillation     S/p Ablation. Not on chronic anticoagulation therapy  Cardiac monitoring      Morbid obesity     Body mass index is 46.25 kg/m². Morbid obesity complicates all aspects of disease management from diagnostic modalities to treatment. Weight loss encouraged and health benefits explained to patient.      MANUELA on CPAP      Continue CPAP at night      Hypertension -  Continue Norvasc and lisinopril.  Use intravenous hydralazine 10 mg every 2 hr as needed for systolic blood pressure above 160 mm of mercury.    Fever  --this is probably secondary to a urinary tract infection.  Chest x-ray was normal.  Blood and urine cultures were obtained  --continue Zosyn                          VTE Risk Mitigation (From admission, onward)         Ordered       enoxaparin injection 40 mg  Every 12 hours      03/14/19 1022       IP VTE HIGH RISK PATIENT  Once      03/09/19 1830                                     Critical care time spent on the evaluation and treatment of severe organ dysfunction, review of pertinent labs and imaging studies, discussions with consulting providers and discussions with patient/family: 30 minutes.     Manda Riggs MD  Department of [facility]   [facility]

## 2019-03-18 LAB
ANION GAP SERPL CALC-SCNC: 7 MMOL/L
BASOPHILS # BLD AUTO: 0 K/UL
BASOPHILS NFR BLD: 0.4 %
BUN SERPL-MCNC: 18 MG/DL
CALCIUM SERPL-MCNC: 8.6 MG/DL
CHLORIDE SERPL-SCNC: 100 MMOL/L
CO2 SERPL-SCNC: 26 MMOL/L
CREAT SERPL-MCNC: 0.9 MG/DL
DIFFERENTIAL METHOD: ABNORMAL
EOSINOPHIL # BLD AUTO: 0.2 K/UL
EOSINOPHIL NFR BLD: 2.1 %
ERYTHROCYTE [DISTWIDTH] IN BLOOD BY AUTOMATED COUNT: 14.7 %
EST. GFR  (AFRICAN AMERICAN): >60 ML/MIN/1.73 M^2
EST. GFR  (NON AFRICAN AMERICAN): >60 ML/MIN/1.73 M^2
GLUCOSE SERPL-MCNC: 114 MG/DL
GLUCOSE SERPL-MCNC: 166 MG/DL (ref 70–110)
HCO3 UR-SCNC: 29.5 MMOL/L (ref 24–28)
HCT VFR BLD AUTO: 35.2 %
HCT VFR BLD CALC: 37 %PCV (ref 36–54)
HGB BLD-MCNC: 11.7 G/DL
LYMPHOCYTES # BLD AUTO: 1.8 K/UL
LYMPHOCYTES NFR BLD: 15.7 %
MCH RBC QN AUTO: 29.3 PG
MCHC RBC AUTO-ENTMCNC: 33.2 G/DL
MCV RBC AUTO: 88 FL
MONOCYTES # BLD AUTO: 1 K/UL
MONOCYTES NFR BLD: 8.8 %
NEUTROPHILS # BLD AUTO: 8.6 K/UL
NEUTROPHILS NFR BLD: 73 %
PCO2 BLDA: 59.5 MMHG (ref 35–45)
PH SMN: 7.3 [PH] (ref 7.35–7.45)
PLATELET # BLD AUTO: 281 K/UL
PMV BLD AUTO: 7.4 FL
PO2 BLDA: 273 MMHG (ref 80–100)
POC BE: 3 MMOL/L
POC IONIZED CALCIUM: 1.13 MMOL/L (ref 1.06–1.42)
POC SATURATED O2: 100 % (ref 95–100)
POC TCO2: 31 MMOL/L (ref 23–27)
POCT GLUCOSE: 150 MG/DL (ref 70–110)
POCT GLUCOSE: 90 MG/DL (ref 70–110)
POCT GLUCOSE: 98 MG/DL (ref 70–110)
POTASSIUM BLD-SCNC: 5.8 MMOL/L (ref 3.5–5.1)
POTASSIUM SERPL-SCNC: 3.8 MMOL/L
RBC # BLD AUTO: 3.99 M/UL
SAMPLE: ABNORMAL
SODIUM BLD-SCNC: 132 MMOL/L (ref 136–145)
SODIUM SERPL-SCNC: 133 MMOL/L
WBC # BLD AUTO: 11.7 K/UL

## 2019-03-18 PROCEDURE — 97116 GAIT TRAINING THERAPY: CPT

## 2019-03-18 PROCEDURE — G8979 MOBILITY GOAL STATUS: HCPCS | Mod: CJ

## 2019-03-18 PROCEDURE — 97161 PT EVAL LOW COMPLEX 20 MIN: CPT

## 2019-03-18 PROCEDURE — 25000003 PHARM REV CODE 250: Performed by: INTERNAL MEDICINE

## 2019-03-18 PROCEDURE — 63600175 PHARM REV CODE 636 W HCPCS: Performed by: NURSE PRACTITIONER

## 2019-03-18 PROCEDURE — 63600175 PHARM REV CODE 636 W HCPCS: Performed by: THORACIC SURGERY (CARDIOTHORACIC VASCULAR SURGERY)

## 2019-03-18 PROCEDURE — 85025 COMPLETE CBC W/AUTO DIFF WBC: CPT

## 2019-03-18 PROCEDURE — 36415 COLL VENOUS BLD VENIPUNCTURE: CPT

## 2019-03-18 PROCEDURE — 25000003 PHARM REV CODE 250: Performed by: NURSE PRACTITIONER

## 2019-03-18 PROCEDURE — 63600175 PHARM REV CODE 636 W HCPCS: Performed by: ANESTHESIOLOGY

## 2019-03-18 PROCEDURE — 94799 UNLISTED PULMONARY SVC/PX: CPT

## 2019-03-18 PROCEDURE — G8978 MOBILITY CURRENT STATUS: HCPCS | Mod: CK

## 2019-03-18 PROCEDURE — 80048 BASIC METABOLIC PNL TOTAL CA: CPT

## 2019-03-18 PROCEDURE — 63600175 PHARM REV CODE 636 W HCPCS: Performed by: INTERNAL MEDICINE

## 2019-03-18 PROCEDURE — 94761 N-INVAS EAR/PLS OXIMETRY MLT: CPT

## 2019-03-18 PROCEDURE — 97802 MEDICAL NUTRITION INDIV IN: CPT

## 2019-03-18 PROCEDURE — 20000000 HC ICU ROOM

## 2019-03-18 RX ORDER — VANCOMYCIN HCL IN 5 % DEXTROSE 1G/250ML
1000 PLASTIC BAG, INJECTION (ML) INTRAVENOUS
Status: DISCONTINUED | OUTPATIENT
Start: 2019-03-18 | End: 2019-03-18

## 2019-03-18 RX ADMIN — PIPERACILLIN SODIUM AND TAZOBACTAM SODIUM 4.5 G: 4; .5 INJECTION, POWDER, LYOPHILIZED, FOR SOLUTION INTRAVENOUS at 08:03

## 2019-03-18 RX ADMIN — ENOXAPARIN SODIUM 40 MG: 100 INJECTION SUBCUTANEOUS at 08:03

## 2019-03-18 RX ADMIN — LIDOCAINE 1 PATCH: 50 PATCH TOPICAL at 10:03

## 2019-03-18 RX ADMIN — OXYCODONE HYDROCHLORIDE 15 MG: 10 TABLET ORAL at 03:03

## 2019-03-18 RX ADMIN — VANCOMYCIN HYDROCHLORIDE 2000 MG: 1 INJECTION, POWDER, FOR SOLUTION INTRAVENOUS at 11:03

## 2019-03-18 RX ADMIN — SENNOSIDES AND DOCUSATE SODIUM 1 TABLET: 8.6; 5 TABLET ORAL at 08:03

## 2019-03-18 RX ADMIN — MICONAZOLE NITRATE: 20 OINTMENT TOPICAL at 10:03

## 2019-03-18 RX ADMIN — MICONAZOLE NITRATE: 20 OINTMENT TOPICAL at 08:03

## 2019-03-18 RX ADMIN — OXYCODONE HYDROCHLORIDE 15 MG: 10 TABLET ORAL at 02:03

## 2019-03-18 RX ADMIN — PIPERACILLIN SODIUM AND TAZOBACTAM SODIUM 4.5 G: 4; .5 INJECTION, POWDER, LYOPHILIZED, FOR SOLUTION INTRAVENOUS at 02:03

## 2019-03-18 RX ADMIN — DIPHENHYDRAMINE HYDROCHLORIDE 12.5 MG: 50 INJECTION, SOLUTION INTRAMUSCULAR; INTRAVENOUS at 11:03

## 2019-03-18 RX ADMIN — ENOXAPARIN SODIUM 40 MG: 100 INJECTION SUBCUTANEOUS at 10:03

## 2019-03-18 RX ADMIN — SENNOSIDES AND DOCUSATE SODIUM 1 TABLET: 8.6; 5 TABLET ORAL at 10:03

## 2019-03-18 RX ADMIN — CLONAZEPAM 1 MG: 1 TABLET ORAL at 11:03

## 2019-03-18 RX ADMIN — CLONAZEPAM 1 MG: 1 TABLET ORAL at 12:03

## 2019-03-18 RX ADMIN — OXYCODONE HYDROCHLORIDE 15 MG: 10 TABLET ORAL at 08:03

## 2019-03-18 RX ADMIN — KETOROLAC TROMETHAMINE 15 MG: 30 INJECTION, SOLUTION INTRAMUSCULAR at 05:03

## 2019-03-18 RX ADMIN — KETOROLAC TROMETHAMINE 15 MG: 30 INJECTION, SOLUTION INTRAMUSCULAR at 12:03

## 2019-03-18 RX ADMIN — PIPERACILLIN SODIUM AND TAZOBACTAM SODIUM 4.5 G: 4; .5 INJECTION, POWDER, LYOPHILIZED, FOR SOLUTION INTRAVENOUS at 05:03

## 2019-03-18 RX ADMIN — KETOROLAC TROMETHAMINE 15 MG: 30 INJECTION, SOLUTION INTRAMUSCULAR at 06:03

## 2019-03-18 RX ADMIN — KETOROLAC TROMETHAMINE 15 MG: 30 INJECTION, SOLUTION INTRAMUSCULAR at 11:03

## 2019-03-18 NOTE — PROGRESS NOTES
Progress Note  Cardiothoracic Surgery    Admit Date: 3/9/2019  Post-operative Day: 3 Days Post-Op  Hospital Day: 10    SUBJECTIVE:ambulated today and did well, pain markedly reduced     Follow-up For: Procedure(s) (LRB):  ORIF, FRACTURE, RIBS (Left)    Scheduled Meds:   enoxaparin  40 mg Subcutaneous Q12H    ketorolac  15 mg Intravenous Q6H    lidocaine  1 patch Transdermal Daily    miconazole nitrate 2%   Topical (Top) BID    piperacillin-tazobactam (ZOSYN) IVPB  4.5 g Intravenous Q8H    senna-docusate 8.6-50 mg  1 tablet Oral BID    vancomycin (VANCOCIN) IVPB  2,000 mg Intravenous Q12H     Infusions/Drips:  PRN Meds: acetaminophen, aluminum-magnesium hydroxide-simethicone, clonazePAM, dextrose 50%, dextrose 50%, diphenhydrAMINE, diphenhydrAMINE, glucagon (human recombinant), glucose, glucose, hydrALAZINE, HYDROmorphone, HYDROmorphone, ibuprofen, influenza, insulin aspart U-100, lactulose, magnesium oxide, magnesium oxide, naloxone, ondansetron, ondansetron, oxyCODONE, potassium chloride 10%, potassium chloride 10%, promethazine (PHENERGAN) IVPB, promethazine (PHENERGAN) IVPB, sodium chloride 0.9%    Review of patient's allergies indicates:  No Known Allergies    OBJECTIVE:     Vital Signs (Most Recent)  Temp: 98.1 °F (36.7 °C) (03/18/19 1600)  Pulse: 92 (03/18/19 1700)  Resp: (!) 23 (03/18/19 1700)  BP: (!) 141/69 (03/18/19 1700)  SpO2: 99 % (03/18/19 1700)    Admission Weight: (!) 167.8 kg (370 lb) (03/09/19 1425)   Most Recent Weight: (!) 187.1 kg (412 lb 7.7 oz) (03/18/19 1329)    Vital Signs Range (Last 24H):  Temp:  [98 °F (36.7 °C)-99.1 °F (37.3 °C)]   Pulse:  []   Resp:  [11-72]   BP: ()/(49-93)   SpO2:  [96 %-100 %]     I & O (Last 24H):    Intake/Output Summary (Last 24 hours) at 3/18/2019 1820  Last data filed at 3/18/2019 1600  Gross per 24 hour   Intake 3421 ml   Output 3335 ml   Net 86 ml     Physical Exam:      Wound/Incision:  Healing well    Laboratory:  CBC:   Recent Labs   Lab  03/18/19  0306   WBC 11.70   RBC 3.99*   HGB 11.7*   HCT 35.2*      MCV 88   MCH 29.3   MCHC 33.2     BMP:   Recent Labs   Lab 03/17/19  0100  03/18/19  0306   *   < > 114*   *   < > 133*   K 3.8   < > 3.8   CL 98   < > 100   CO2 26   < > 26   BUN 17   < > 18   CREATININE 1.1   < > 0.9   CALCIUM 8.1*   < > 8.6*   MG 1.8  --   --     < > = values in this interval not displayed.       Diagnostic Results:  X-Ray: Reviewed    ASSESSMENT/PLAN:     Assessment: s/p ORIF ribs, doing very well    Plan: If drainage continues to taper will remove drains in AM

## 2019-03-18 NOTE — PLAN OF CARE
Problem: Physical Therapy Goal  Goal: Physical Therapy Goal  Goals to be met by: 2019     Patient will increase functional independence with mobility by performin. Sit to stand transfer with Contact Guard Assistance  2. Bed to chair transfer with Contact Guard Assistance   3. Gait  x 250x2 feet with Contact Guard Assistance using Rolling Walker/HHA    4. Lower extremity exercise program x20 reps     Outcome: Ongoing (interventions implemented as appropriate)  PT eval and treat completed. Gait 150ft with RW. With CT to water seal. OOB to chair

## 2019-03-18 NOTE — PLAN OF CARE
Problem: Adult Inpatient Plan of Care  Goal: Plan of Care Review  Outcome: Ongoing (interventions implemented as appropriate)  Care plan reviewed. Safety maintained. Patient ambulated hallway twice today, once with PT, second with nursing.  Up in chair for 2.75 hours.   Chest tube draining serous/serosang drainage. NICHOLE draining serous/serosanginous drainage.   Possible removal of CT and NICHOLE today, and transfer out of ICU.   Patient wears own CPAP as needed. Patient has been afebrile today. Has positive blood cultures.   Medicated today with PRN Oxycodone, PRN Klonopin.

## 2019-03-18 NOTE — PLAN OF CARE
Problem: Adult Inpatient Plan of Care  Goal: Plan of Care Review  Outcome: Ongoing (interventions implemented as appropriate)  Alert. Vital signs stable. Afebrile. Out of bed x2 during the night. Voiding without difficulty. Chest tube and Taras-Hurst drain with thin sero-sang drainage. Pain controlled with Oxycodone, Ketorolac, Hydromorphone.Surgical site stable.. Tolerating diet. Plan transfer to floor, encourage use of incentive spirometer. Chest tube out?

## 2019-03-18 NOTE — PT/OT/SLP EVAL
Physical Therapy Evaluation    Patient Name:  Bird Feng   MRN:  [id number]    Recommendations:     Discharge Recommendations:  home   Discharge Equipment Recommendations: none   Barriers to discharge: None    Assessment:     Bird Feng is a 54 y.o. male admitted with a medical diagnosis of Multiple closed fractures of ribs of left side.  He presents with the following impairments/functional limitations:  weakness, impaired self care skills, impaired endurance, impaired functional mobilty, impaired balance, orthopedic precautions, impaired cardiopulmonary response to activity, pain . Pt seen at ICU, eager to ambulate. Pt currently  requiring use of RW for gait stability. Pt to benefit from continued therapies.    Rehab Prognosis: Good; patient would benefit from acute skilled PT services to address these deficits and reach maximum level of function.    Recent Surgery: Procedure(s) (LRB):  ORIF, FRACTURE, RIBS (Left) 3 Days Post-Op    Plan:     During this hospitalization, patient to be seen 6 x/week to address the identified rehab impairments via gait training, therapeutic activities, therapeutic exercises and progress toward the following goals:    · Plan of Care Expires:  04/05/19    Subjective     Chief Complaint: pain L side but feels good to walk  Patient/Family Comments/goals: get well  Pain/Comfort:  · Pain Rating 1: 5/10  · Location - Side 1: Left  · Location 1: chest  · Pain Addressed 1: Reposition, Pre-medicate for activity    Patients cultural, spiritual, Congregation conflicts given the current situation:      Living Environment:  Home with spouse  Prior to admission, patients level of function was independent.  Equipment used at home: none.  DME owned (not currently used): none.  Upon discharge, patient will have assistance from spouse.    Objective:     Communicated with nurse Lei prior to session.  Patient found all lines intact, call button in reach and spouse present chest tube, blood pressure  cuff, telemetry  upon PT entry to room.    General Precautions: Standard, fall   Orthopedic Precautions:(multiple L rib fx 2,3,4,5)   Braces: N/A     Exams:  · Postural Exam:  Patient presented with the following abnormalities:    · -       Rounded shoulders  · -       Forward head  · -       tall/obese  · RLE ROM: WFL  · RLE Strength: WFL  · LLE ROM: WFL  · LLE Strength: WFL    Functional Mobility:  · Transfers:     · Sit to Stand:  minimum assistance and moderate assistance with hand-held assist  · Bed to Chair: minimum assistance with  hand-held assist  using  Stand Pivot  · Gait: 150ft with RW/CT to water seal. pt requiring assist maneuvering RW especially with turns      Therapeutic Activities and Exercises:   OOB to chair post PT  Spouse assisting with care  Pt instructed on LE's thera ex with AP,LAQ, marches    AM-PAC 6 CLICK MOBILITY  Total Score:16     Patient left up in chair with all lines intact, call button in reach and spouse present.    GOALS:   Multidisciplinary Problems     Physical Therapy Goals        Problem: Physical Therapy Goal    Goal Priority Disciplines Outcome Goal Variances Interventions   Physical Therapy Goal     PT, PT/OT Ongoing (interventions implemented as appropriate)     Description:  Goals to be met by: 2019     Patient will increase functional independence with mobility by performin. Sit to stand transfer with Contact Guard Assistance  2. Bed to chair transfer with Contact Guard Assistance   3. Gait  x 250x2 feet with Contact Guard Assistance using Rolling Walker/HHA    4. Lower extremity exercise program x20 reps                       History:     Past Medical History:   Diagnosis Date    Atrial fibrillation 2006    Cardioversion    Atrial fibrillation     Hypertension     Sleep apnea        Past Surgical History:   Procedure Laterality Date    CARDIOVERSION  2006    X2    REPAIR, HERNIA, UMBILICAL, AGE 4 YEARS OR OLDER N/A 2013    Performed by Adán  EMILIA Horne MD at [facility] OR    TONSILLECTOMY         Time Tracking:     PT Received On: 03/18/19  PT Start Time: 1008     PT Stop Time: 1027  PT Total Time (min): 19 min     Billable Minutes: Evaluation 8 and Gait Training 11      Evangelina Baker, PT  03/18/2019

## 2019-03-18 NOTE — PROGRESS NOTES
"[facility]  Adult Nutrition  Progress Note    SUMMARY   Intervention: fat/sodium/calorie modified diet and nutrition education-weight loss and heart healthy diet     Recommendation:   1) Continue cardiac diet   2) Weigh pt weekly or as needed   3) Nutrition eductaionand handouts given    Goals: 1) Pt Adheres to cardiac diet by f/u   Nutrition Goal Status: new  Communication of RD Recs: (POC, sticky note)    Reason for Assessment    Reason For Assessment: length of stay  Diagnosis: (multiple rib fractures)  Relevant Medical History: AFIB, HTN, MANUELA, morbid obesity  Interdisciplinary Rounds: attended    General Information Comments: 53 y/o male admitted to ICU with multiple rib fractures s/p fall. POD 3 sx. Plan to transfer to floor today. PTA pt was eating very well, eating out often because he travels a lot. Tolerating cardiac diet wwll, but wife bringing in food from outside, ex: steak, mashed potatoes, and broccoli from [employer] last night. NFPE done, pt appears obese. Educated pt and wife on tips for weight loss/heart healthy eating and provided meal plan. Set small goal such as cutting back on soda consumption.    Nutrition Discharge Planning: To be determined- Cardiac Diet    Nutrition Risk Screen    Nutrition Risk Screen: no indicators present    Nutrition/Diet History    Patient Reported Diet/Restrictions/Preferences: general  Typical Food/Fluid Intake: 5-6 (16 oz sodas) daily. 2 years ago pt followed a low carb diet and lost 100 lb, has since regained after getting  recently.  Spiritual, Cultural Beliefs, Voodoo Practices, Values that Affect Care: no  Food Allergies: NKFA(or intolerances)    Anthropometrics    Temp: 98.2 °F (36.8 °C)  Height Method: Measured  Height: 6' 3"  Height (inches): 75 in  Weight Method: Bed Scale  Weight: (!) 187.1 kg (412 lb 7.7 oz)  Weight (lb): (!) 412.48 lb  Ideal Body Weight (IBW), Male: 196 lb  % Ideal Body Weight, Male (lb): 210.45 " lb  BMI (Calculated): 51.7  BMI Grade: greater than 40 - morbid obesity  Usual Body Weight (UBW), kg: (!) 158.8 kg  % Usual Body Weight: 118.07  % Weight Change From Usual Weight: 17.82 %       Lab/Procedures/Meds    Pertinent Labs Reviewed: reviewed  BMP  Lab Results   Component Value Date     (L) 03/18/2019    K 3.8 03/18/2019     03/18/2019    CO2 26 03/18/2019    BUN 18 03/18/2019    CREATININE 0.9 03/18/2019    CALCIUM 8.6 (L) 03/18/2019    ANIONGAP 7 (L) 03/18/2019    ESTGFRAFRICA >60 03/18/2019    EGFRNONAA >60 03/18/2019     Lab Results   Component Value Date    CHOL 245 (H) 08/02/2018     Lab Results   Component Value Date    HDL 50 08/02/2018     Lab Results   Component Value Date    LDLCALC 167 (H) 08/02/2018     Lab Results   Component Value Date    TRIG 139 08/02/2018     Lab Results   Component Value Date    CHOLHDL 4.9 08/02/2018       Pertinent Medications Reviewed: reviewed  Pertinent Medications Comments: senna, lactulose, zofran, KCl    Estimated/Assessed Needs    Weight Used For Calorie Calculations: (!) 187.1 kg (412 lb 7.7 oz)  Energy Calorie Requirements (kcal): Cidra St Jeor ( - 500 kcal for wt loss) = 2291 kcal  Energy Need Method: Cidra-St Jeor  Protein Requirements: 0.8 g protein/kg r/t obesity  ( 149 g protein)   Weight Used For Protein Calculations: (!) 187.1 kg (412 lb 7.7 oz)  Fluid Requirements (mL): 2300 ml  Estimated Fluid Requirement Method: RDA Method  CHO Requirement: N/A      Nutrition Prescription Ordered    Current Diet Order: Cardiac    Evaluation of Received Nutrient/Fluid Intake    Energy Calories Required: meeting needs  Protein Required: meeting needs  Fluid Required: meeting needs  Tolerance: tolerating  % Intake of Estimated Energy Needs: 100%  % Meal Intake: 50-75% + food from outside    Nutrition Risk    Level of Risk/Frequency of Follow-up: low - moderate 1 x weekly     Assessment and Plan    Morbid obesity    Contributing Nutrition Diagnosis  Obesity  Stage 3    Related to (etiology):   Excessive energy intake    Signs and Symptoms (as evidenced by):   1) BMI > 50 kg/m2    Interventions/Recommendations (treatment strategy):  1) Cardiac diet 2) Weight loss education/meal plan given    Nutrition Diagnosis Status:   New            Monitor and Evaluation    Food and Nutrient Intake: energy intake  Food and Nutrient Adminstration: diet order  Anthropometric Measurements: weight  Biochemical Data, Medical Tests and Procedures: electrolyte and renal panel, lipid profile  Nutrition-Focused Physical Findings: overall appearance     Malnutrition Assessment     Skin (Micronutrient): (Surendra = 20, chest incision)  Teeth (Micronutrient): (WDL)                       Subcutaneous Fat Loss (Final Summary): well nourished  Muscle Loss Evaluation (Final Summary): well nourished         Nutrition Follow-Up    RD Follow-up?: Yes

## 2019-03-18 NOTE — PROGRESS NOTES
Progress Note  Hospital Medicine  Patient Name:Bird Feng  MRN:  [id number]  Patient Class: IP- Inpatient  Admit Date: 3/9/2019  Length of Stay: 9 days  Expected Discharge Date:   Attending Physician: Susan Toure MD  Primary Care Provider:  Jared Villa MD    SUBJECTIVE:     Principal Problem: Multiple closed fractures of ribs of left side  Initial history of present illness: 54 y.o. male  has a past medical history of Atrial fibrillation (2006), Atrial fibrillation, Hypertension, and Sleep apnea who presents to the ED via EMS for evaluation of left shoulder pain. Pt reports being in his normal state of health until just prior to arrival when he began experiencing severe L shoulder pain after falling from horse. In the ED pt was treated with multiple doses of IV dilaudid before experiencing any relief. Imaging preformed in the ED showed multiple rib fractures. Given significant pain medication requirement Hospital medicine was consulted for admission.     PMH/PSH/SH/FH/Meds: reviewed.    Symptoms/Review of Systems:  Afebrile.  Patient is in intensive care unit status post left ribs open reduction internal fixation surgery performed by Dr. Garrett on March 15, 2019.  1/2 blood cultures from March 17, 2019 are positive for Staph species.  Patient is sitting in chair.  Daughter is at bedside. No shortness of breath, cough, or headache, fever or abdominal pain.  Diet:  Cardiac  Activity level: Normal.    Pain:  Left chest wall pain    OBJECTIVE:   Vital Signs (Most Recent):      Temp: 98.2 °F (36.8 °C) (03/18/19 0800)  Pulse: 80 (03/18/19 0800)  Resp: 12 (03/18/19 0800)  BP: (!) 100/57 (03/18/19 0800)  SpO2: 98 % (03/18/19 0800)       Vital Signs Range (Last 24H):  Temp:  [98.2 °F (36.8 °C)-99.1 °F (37.3 °C)]   Pulse:  []   Resp:  [11-42]   BP: ()/(48-91)   SpO2:  [96 %-100 %]     Weight: (!) 187.1 kg (412 lb 7.7 oz)  Body mass index is 51.56 kg/m².    Intake/Output Summary (Last 24 hours) at 3/18/2019  0940  Last data filed at 3/18/2019 0800  Gross per 24 hour   Intake 2901 ml   Output 2680 ml   Net 221 ml     Physical Examination:  Constitutional: He is oriented to person, place, and time. He appears well-developed and well-nourished. No distress. Morbidly obese male.  HENT:   Head: Normocephalic and atraumatic.   Eyes: EOM are normal. Pupils are equal, round, and reactive to light.   Neck: Normal range of motion. Neck supple.   Cardiovascular: Regular rhythm.   Pulmonary/Chest: Effort normal and breath sounds normal. He has no rales.  Left chest with chest tube-underwater seal and a NICHOLE drain is in place.  Surgical dressings clean dry and intact.  Abdominal: Soft. Bowel sounds are normal. There is no tenderness.   Musculoskeletal: Normal range of motion.   Neurological: He is alert and oriented to person, place, and time.   Skin: Skin is warm and dry.   Excoriations on L lateral shoulder from fall     CBC:  Recent Labs   Lab 03/17/19  0100 03/17/19  0512 03/18/19  0306   WBC 14.80* 14.20* 11.70   RBC 4.09* 4.07* 3.99*   HGB 11.9* 11.9* 11.7*   HCT 36.0* 35.6* 35.2*    271 281   MCV 88 87 88   MCH 29.2 29.1 29.3   MCHC 33.1 33.3 33.2   BMP  Recent Labs   Lab 03/17/19  0100 03/17/19  0512 03/18/19  0306   * 112* 114*   * 133* 133*   K 3.8 4.2 3.8   CL 98 99 100   CO2 26 26 26   BUN 17 20 18   CREATININE 1.1 1.2 0.9   CALCIUM 8.1* 8.4* 8.6*   MG 1.8  --   --       Diagnostic Results:  Microbiology Results (last 7 days)     Procedure Component Value Units Date/Time    Blood culture [[id number]] Collected:  03/17/19 0430    Order Status:  Completed Specimen:  Blood from Peripheral, Right  Hand Updated:  03/18/19 0645     Blood Culture, Routine Gram stain aer bottle: Gram positive cocci in clusters resembling Staph      Blood Culture, Routine Results called to and read back by: Dulce Sandhu RN 03/18/2019  06:45    Narrative:       Aerobic and anaerobic  Collection has been rescheduled by [facility] at  03/16/2019 21:24 Reason:   nurse will call when to draw patient blood  Collection has been rescheduled by [facility] at 03/16/2019 21:24 Reason:   nurse will call when to draw patient blood    Blood culture [[id number]] Collected:  03/17/19 0512    Order Status:  Completed Specimen:  Blood from Peripheral, Right Updated:  03/17/19 1715     Blood Culture, Routine No Growth to date    Narrative:       Aerobic and anaerobic  Collection has been rescheduled by [facility] at 03/16/2019 21:24 Reason:   nurse will call when to draw patient blood  Collection has been rescheduled by [facility] at 03/16/2019 21:24 Reason:   nurse will call when to draw patient blood         CT scan of the chest without contrast:  Acute fractures of the left 2nd through 8th ribs.  Edge to edge apposition of the lower ribs with bayonet shortening of the 5th rib.  There is  subcutaneous emphysema however a pneumothorax is not seen.  Three small bands of pulmonary contusion noted in the left upper lobe.  Other fractures are not identified.  Mediastinal hemorrhage or hematoma is not seen.    Left scapula x-ray:  Negative plain x-rays of the left scapula.  Acute fractures of the left 2nd 3rd 4th and 5th ribs with subcutaneous emphysema and possible pneumothorax.    Left shoulder x-ray:  Acute fractures of the left 2nd 3rd 4th and 5th ribs with a possible small left apical pneumothorax and a small amount of subcutaneous emphysema.  A fracture of the scapula or of the shoulder itself is not seen.  Dislocation is not noted.     Left rib series:  Multiple displaced and angulated left rib fractures, similar in alignment comparing across modalities.  Pulmonary contusion in the left midlung zone.    Chest x-ray:  1. Trace left pneumothorax with chest tubes in place.  2. Improved alignment post internal fixation of several left rib fractures.  3. Cardiomegaly.  4. Mild pulmonary edema.  Left midlung contusion.    Chest x-ray:  Left apical pneumothorax is unchanged with 2 chest  tubes in place.  Resolving left pulmonary contusion.  Multiple left rib fractures, some with internal fixation.    Chest x-ray:  Right lung is clear.  Left midlung zone contusion less conspicuous.  Unchanged cardiac enlargement.  No pleural effusion.  Trace left apical pneumothorax with chest tube in place.  Multiple left rib fractures several which have undergone internal fixation.    Assessment/Plan:     * Multiple closed fractures of ribs of left side  Pulmonary contusion     2/2 fall from horse.  Status post rib plating  Imaging + contusion, however -ve for PTX  Continue Lidoderm patch.  Continue intravenous Zosyn antibiotics.  Continue incentive spirometry.  Follow Dr. Garrett recommendations.  Chest tube management as per Dr. Garrett.     DAYANNA (generalized anxiety disorder)     Chronic problem, with acute decompensation  Increase clonopin to TID prn     Paroxysmal atrial fibrillation     S/p Ablation. Not on chronic anticoagulation therapy  Cardiac monitoring     Morbid obesity     Body mass index is 46.25 kg/m². Morbid obesity complicates all aspects of disease management from diagnostic modalities to treatment. Weight loss encouraged and health benefits explained to patient.      MANUELA on CPAP     Chronic issues  OK to use home CPAP.      Hypertension- presently hypotensive  Discontinue Norvasc and lisinopril.  Monitor blood pressure closely.    Staph bacteremia - possibility of contamination exist  Follow blood cultures closely.  Continue intravenous vancomycin.  Pharmacist to dose vancomycin.    Discussed with patient's daughter.  Patient may be transferred to medicine telemetry floor once okay with Dr. Garrett.     VTE Risk Mitigation (From admission, onward)        Ordered     enoxaparin injection 40 mg  Every 12 hours      03/14/19 1022     IP VTE HIGH RISK PATIENT  Once      03/09/19 1830        Susan Toure MD  Department of Hospital Medicine   [facility]

## 2019-03-18 NOTE — PLAN OF CARE
Problem: Adult Inpatient Plan of Care  Goal: Plan of Care Review  Outcome: Ongoing (interventions implemented as appropriate)  Pt on room air with 99% sats and IS, pt achieves 1750 on IS, pt ready for self admin.

## 2019-03-18 NOTE — PLAN OF CARE
Problem: Adult Inpatient Plan of Care  Goal: Plan of Care Review  Outcome: Ongoing (interventions implemented as appropriate)  Respirations unlabored. CT intact to water seal. Serosang drainage. NICHOLE intact. Pain more controlled today. Activity increased. OOB x 3 hours. [place] d/c'd. Voids without problems. Safety maintained. Afebrile today.

## 2019-03-18 NOTE — ASSESSMENT & PLAN NOTE
Contributing Nutrition Diagnosis  Obesity Stage 3    Related to (etiology):   Excessive energy intake    Signs and Symptoms (as evidenced by):   1) BMI > 50 kg/m2    Interventions/Recommendations (treatment strategy):  1) Cardiac diet 2) Weight loss education/meal plan given    Nutrition Diagnosis Status:   New

## 2019-03-18 NOTE — PLAN OF CARE
Problem: Adult Inpatient Plan of Care  Goal: Patient-Specific Goal (Individualization)  Intervention: fat/sodium/calorie modified diet and nutrition education-weight loss and heart healthy diet      Recommendation:   1) Continue cardiac diet   2) Weigh pt weekly or as needed   3) Nutrition eductaionand handouts given     Goals: 1) Pt Adheres to cardiac diet by f/u   Nutrition Goal Status: new  Communication of RD Recs: (POC, sticky note)

## 2019-03-18 NOTE — CONSULTS
Bird Feng [id number] is a 54 y.o. male who has been consulted for vancomycin dosing.    The patient has the following labs:     Date Creatinine (mg/dl)    BUN WBC Count   3/18/2019 Estimated Creatinine Clearance: 164.6 mL/min (based on SCr of 0.9 mg/dL). Lab Results   Component Value Date    BUN 18 03/18/2019     Lab Results   Component Value Date    WBC 11.70 03/18/2019        Current weight is (!) 187.1 kg (412 lb 7.7 oz)    The patient will be started on vancomycin at a dose of 2000 mg every 12 hours (10 mg/kg/dose).  The vancomycin trough has been ordered for 03/19 at 1030.  Target trough goal is 15 to 20 mg/dL for sepsis.    Patient will be followed by pharmacy for changes in renal function, toxicity, and efficacy.   Thank you for allowing us to participate in this patient's care.     Waldo Hernandez, KarenaD

## 2019-03-19 LAB
ANION GAP SERPL CALC-SCNC: 9 MMOL/L
BASOPHILS # BLD AUTO: 0 K/UL
BASOPHILS NFR BLD: 0.4 %
BLD PROD TYP BPU: NORMAL
BLOOD UNIT EXPIRATION DATE: NORMAL
BLOOD UNIT TYPE CODE: 600
BLOOD UNIT TYPE: NORMAL
BUN SERPL-MCNC: 20 MG/DL
CALCIUM SERPL-MCNC: 8.9 MG/DL
CHLORIDE SERPL-SCNC: 101 MMOL/L
CO2 SERPL-SCNC: 24 MMOL/L
CODING SYSTEM: NORMAL
CREAT SERPL-MCNC: 0.9 MG/DL
DIFFERENTIAL METHOD: ABNORMAL
DISPENSE STATUS: NORMAL
EOSINOPHIL # BLD AUTO: 0.2 K/UL
EOSINOPHIL NFR BLD: 2.6 %
ERYTHROCYTE [DISTWIDTH] IN BLOOD BY AUTOMATED COUNT: 14.6 %
EST. GFR  (AFRICAN AMERICAN): >60 ML/MIN/1.73 M^2
EST. GFR  (NON AFRICAN AMERICAN): >60 ML/MIN/1.73 M^2
GLUCOSE SERPL-MCNC: 104 MG/DL
HCT VFR BLD AUTO: 34 %
HGB BLD-MCNC: 11.2 G/DL
LYMPHOCYTES # BLD AUTO: 1.6 K/UL
LYMPHOCYTES NFR BLD: 18.2 %
MCH RBC QN AUTO: 28.9 PG
MCHC RBC AUTO-ENTMCNC: 32.8 G/DL
MCV RBC AUTO: 88 FL
MONOCYTES # BLD AUTO: 0.8 K/UL
MONOCYTES NFR BLD: 9.7 %
NEUTROPHILS # BLD AUTO: 6 K/UL
NEUTROPHILS NFR BLD: 69.1 %
NUM UNITS TRANS PACKED RBC: NORMAL
PLATELET # BLD AUTO: 308 K/UL
PMV BLD AUTO: 7.3 FL
POCT GLUCOSE: 103 MG/DL (ref 70–110)
POCT GLUCOSE: 95 MG/DL (ref 70–110)
POTASSIUM SERPL-SCNC: 4 MMOL/L
RBC # BLD AUTO: 3.87 M/UL
SODIUM SERPL-SCNC: 134 MMOL/L
VANCOMYCIN TROUGH SERPL-MCNC: 10 UG/ML
WBC # BLD AUTO: 8.7 K/UL

## 2019-03-19 PROCEDURE — 94761 N-INVAS EAR/PLS OXIMETRY MLT: CPT

## 2019-03-19 PROCEDURE — 25000003 PHARM REV CODE 250: Performed by: INTERNAL MEDICINE

## 2019-03-19 PROCEDURE — 63600175 PHARM REV CODE 636 W HCPCS: Performed by: ANESTHESIOLOGY

## 2019-03-19 PROCEDURE — 25000003 PHARM REV CODE 250: Performed by: NURSE PRACTITIONER

## 2019-03-19 PROCEDURE — 27000221 HC OXYGEN, UP TO 24 HOURS

## 2019-03-19 PROCEDURE — 11000001 HC ACUTE MED/SURG PRIVATE ROOM

## 2019-03-19 PROCEDURE — 63600175 PHARM REV CODE 636 W HCPCS: Performed by: INTERNAL MEDICINE

## 2019-03-19 PROCEDURE — 80048 BASIC METABOLIC PNL TOTAL CA: CPT

## 2019-03-19 PROCEDURE — 94799 UNLISTED PULMONARY SVC/PX: CPT

## 2019-03-19 PROCEDURE — 80202 ASSAY OF VANCOMYCIN: CPT

## 2019-03-19 PROCEDURE — 63600175 PHARM REV CODE 636 W HCPCS: Performed by: NURSE PRACTITIONER

## 2019-03-19 PROCEDURE — 36415 COLL VENOUS BLD VENIPUNCTURE: CPT

## 2019-03-19 PROCEDURE — 85025 COMPLETE CBC W/AUTO DIFF WBC: CPT

## 2019-03-19 RX ORDER — HYDROCORTISONE 25 MG/G
CREAM TOPICAL EVERY 6 HOURS PRN
Status: DISCONTINUED | OUTPATIENT
Start: 2019-03-19 | End: 2019-03-20 | Stop reason: HOSPADM

## 2019-03-19 RX ORDER — OXYCODONE HYDROCHLORIDE 5 MG/1
15 TABLET ORAL ONCE
Status: COMPLETED | OUTPATIENT
Start: 2019-03-19 | End: 2019-03-19

## 2019-03-19 RX ADMIN — CLONAZEPAM 1 MG: 1 TABLET ORAL at 06:03

## 2019-03-19 RX ADMIN — SENNOSIDES AND DOCUSATE SODIUM 1 TABLET: 8.6; 5 TABLET ORAL at 08:03

## 2019-03-19 RX ADMIN — OXYCODONE HYDROCHLORIDE 15 MG: 10 TABLET ORAL at 07:03

## 2019-03-19 RX ADMIN — SENNOSIDES AND DOCUSATE SODIUM 1 TABLET: 8.6; 5 TABLET ORAL at 09:03

## 2019-03-19 RX ADMIN — PIPERACILLIN SODIUM AND TAZOBACTAM SODIUM 4.5 G: 4; .5 INJECTION, POWDER, LYOPHILIZED, FOR SOLUTION INTRAVENOUS at 02:03

## 2019-03-19 RX ADMIN — HYDROMORPHONE HYDROCHLORIDE 1 MG: 2 INJECTION, SOLUTION INTRAMUSCULAR; INTRAVENOUS; SUBCUTANEOUS at 06:03

## 2019-03-19 RX ADMIN — ENOXAPARIN SODIUM 40 MG: 100 INJECTION SUBCUTANEOUS at 09:03

## 2019-03-19 RX ADMIN — LIDOCAINE 1 PATCH: 50 PATCH TOPICAL at 08:03

## 2019-03-19 RX ADMIN — OXYCODONE HYDROCHLORIDE 15 MG: 5 TABLET ORAL at 11:03

## 2019-03-19 RX ADMIN — PIPERACILLIN SODIUM AND TAZOBACTAM SODIUM 4.5 G: 4; .5 INJECTION, POWDER, LYOPHILIZED, FOR SOLUTION INTRAVENOUS at 09:03

## 2019-03-19 RX ADMIN — CLONAZEPAM 1 MG: 1 TABLET ORAL at 07:03

## 2019-03-19 RX ADMIN — OXYCODONE HYDROCHLORIDE 15 MG: 10 TABLET ORAL at 03:03

## 2019-03-19 RX ADMIN — OXYCODONE HYDROCHLORIDE 15 MG: 10 TABLET ORAL at 10:03

## 2019-03-19 RX ADMIN — PIPERACILLIN SODIUM AND TAZOBACTAM SODIUM 4.5 G: 4; .5 INJECTION, POWDER, LYOPHILIZED, FOR SOLUTION INTRAVENOUS at 06:03

## 2019-03-19 RX ADMIN — MICONAZOLE NITRATE: 20 OINTMENT TOPICAL at 08:03

## 2019-03-19 RX ADMIN — MICONAZOLE NITRATE: 20 OINTMENT TOPICAL at 09:03

## 2019-03-19 RX ADMIN — HYDROCORTISONE: 25 CREAM TOPICAL at 05:03

## 2019-03-19 RX ADMIN — OXYCODONE HYDROCHLORIDE 15 MG: 10 TABLET ORAL at 11:03

## 2019-03-19 RX ADMIN — DIPHENHYDRAMINE HYDROCHLORIDE 12.5 MG: 50 INJECTION, SOLUTION INTRAMUSCULAR; INTRAVENOUS at 01:03

## 2019-03-19 RX ADMIN — ENOXAPARIN SODIUM 40 MG: 100 INJECTION SUBCUTANEOUS at 08:03

## 2019-03-19 RX ADMIN — IBUPROFEN 600 MG: 600 TABLET ORAL at 08:03

## 2019-03-19 RX ADMIN — VANCOMYCIN HYDROCHLORIDE 2000 MG: 1 INJECTION, POWDER, FOR SOLUTION INTRAVENOUS at 11:03

## 2019-03-19 NOTE — PROGRESS NOTES
Progress Note  Hospital Medicine  Patient Name:Bird Feng  MRN:  [id number]  Patient Class: IP- Inpatient  Admit Date: 3/9/2019  Length of Stay: 10 days  Expected Discharge Date:   Attending Physician: Susan Toure MD  Primary Care Provider:  Jared Villa MD    SUBJECTIVE:     Principal Problem: Multiple closed fractures of ribs of left side  Initial history of present illness: 54 y.o. male  has a past medical history of Atrial fibrillation (2006), Atrial fibrillation, Hypertension, and Sleep apnea who presents to the ED via EMS for evaluation of left shoulder pain. Pt reports being in his normal state of health until just prior to arrival when he began experiencing severe L shoulder pain after falling from horse. In the ED pt was treated with multiple doses of IV dilaudid before experiencing any relief. Imaging preformed in the ED showed multiple rib fractures. Given significant pain medication requirement Hospital medicine was consulted for admission.     PMH/PSH/SH/FH/Meds: reviewed.    Symptoms/Review of Systems:  Afebrile.  Feeling better after chest tube removal by Dr. Garrett this morning.  Rib pain is much improved. No shortness of breath, cough, or headache, fever or abdominal pain.  Diet:  Cardiac  Activity level: Normal.    Pain:  Left chest wall pain    OBJECTIVE:   Vital Signs (Most Recent):      Temp: 98.1 °F (36.7 °C) (03/19/19 0800)  Pulse: 87 (03/19/19 0900)  Resp: (!) 22 (03/19/19 0900)  BP: 128/62 (03/19/19 0900)  SpO2: 100 % (03/19/19 0900)       Vital Signs Range (Last 24H):  Temp:  [98 °F (36.7 °C)-98.6 °F (37 °C)]   Pulse:  [79-99]   Resp:  [15-72]   BP: (103-149)/(51-93)   SpO2:  [97 %-100 %]     Weight: (!) 196.5 kg (433 lb 3.3 oz)  Body mass index is 54.15 kg/m².    Intake/Output Summary (Last 24 hours) at 3/19/2019 0954  Last data filed at 3/19/2019 0900  Gross per 24 hour   Intake 3580 ml   Output 5675 ml   Net -2095 ml     Physical Examination:  Constitutional: He is oriented to  person, place, and time. He appears well-developed and well-nourished. No distress. Morbidly obese male.  HENT:   Head: Normocephalic and atraumatic.   Eyes: EOM are normal. Pupils are equal, round, and reactive to light.   Neck: Normal range of motion. Neck supple.   Cardiovascular: Regular rhythm.   Pulmonary/Chest: Effort normal and breath sounds normal. He has no rales.  Left chest wall dressing clean dry and intact.  Abdominal: Soft. Bowel sounds are normal. There is no tenderness.   Musculoskeletal: Normal range of motion.   Neurological: He is alert and oriented to person, place, and time.   Skin: Skin is warm and dry.   Excoriations on L lateral shoulder from fall     CBC:  Recent Labs   Lab 03/17/19  0512 03/18/19  0306 03/19/19  0306   WBC 14.20* 11.70 8.70   RBC 4.07* 3.99* 3.87*   HGB 11.9* 11.7* 11.2*   HCT 35.6* 35.2* 34.0*    281 308   MCV 87 88 88   MCH 29.1 29.3 28.9   MCHC 33.3 33.2 32.8   BMP  Recent Labs   Lab 03/17/19  0100 03/17/19  0512 03/18/19  0306 03/19/19  0306   * 112* 114* 104   * 133* 133* 134*   K 3.8 4.2 3.8 4.0   CL 98 99 100 101   CO2 26 26 26 24   BUN 17 20 18 20   CREATININE 1.1 1.2 0.9 0.9   CALCIUM 8.1* 8.4* 8.6* 8.9   MG 1.8  --   --   --       Diagnostic Results:  Microbiology Results (last 7 days)     Procedure Component Value Units Date/Time    Blood culture [[id number]] Collected:  03/17/19 0430    Order Status:  Completed Specimen:  Blood from Peripheral, Right  Hand Updated:  03/19/19 0921     Blood Culture, Routine Gram stain aer bottle: Gram positive cocci in clusters resembling Staph      Blood Culture, Routine Results called to and read back by: Dulce Sandhu RN 03/18/2019  06:45     Blood Culture, Routine --     COAGULASE-NEGATIVE STAPHYLOCOCCUS SPECIES  Organism is a probable contaminant      Narrative:       Aerobic and anaerobic  Collection has been rescheduled by [facility] at 03/16/2019 21:24 Reason:   nurse will call when to draw patient  blood  Collection has been rescheduled by [facility] at 03/16/2019 21:24 Reason:   nurse will call when to draw patient blood    Blood culture [[id number]] Collected:  03/17/19 0512    Order Status:  Completed Specimen:  Blood from Peripheral, Right Updated:  03/18/19 1212     Blood Culture, Routine No Growth to date     Blood Culture, Routine No Growth to date    Narrative:       Aerobic and anaerobic  Collection has been rescheduled by [facility] at 03/16/2019 21:24 Reason:   nurse will call when to draw patient blood  Collection has been rescheduled by [facility] at 03/16/2019 21:24 Reason:   nurse will call when to draw patient blood         CT scan of the chest without contrast:  Acute fractures of the left 2nd through 8th ribs.  Edge to edge apposition of the lower ribs with bayonet shortening of the 5th rib.  There is  subcutaneous emphysema however a pneumothorax is not seen.  Three small bands of pulmonary contusion noted in the left upper lobe.  Other fractures are not identified.  Mediastinal hemorrhage or hematoma is not seen.    Left scapula x-ray:  Negative plain x-rays of the left scapula.  Acute fractures of the left 2nd 3rd 4th and 5th ribs with subcutaneous emphysema and possible pneumothorax.    Left shoulder x-ray:  Acute fractures of the left 2nd 3rd 4th and 5th ribs with a possible small left apical pneumothorax and a small amount of subcutaneous emphysema.  A fracture of the scapula or of the shoulder itself is not seen.  Dislocation is not noted.     Left rib series:  Multiple displaced and angulated left rib fractures, similar in alignment comparing across modalities.  Pulmonary contusion in the left midlung zone.    Chest x-ray:  1. Trace left pneumothorax with chest tubes in place.  2. Improved alignment post internal fixation of several left rib fractures.  3. Cardiomegaly.  4. Mild pulmonary edema.  Left midlung contusion.    Chest x-ray:  Left apical pneumothorax is unchanged with 2 chest tubes in place.   Resolving left pulmonary contusion.  Multiple left rib fractures, some with internal fixation.    Chest x-ray:  Right lung is clear.  Left midlung zone contusion less conspicuous.  Unchanged cardiac enlargement.  No pleural effusion.  Trace left apical pneumothorax with chest tube in place.  Multiple left rib fractures several which have undergone internal fixation.    Chest x-ray:  Small left apical pneumothorax with chest tube in place, very mildly increased in size.  Multiple left rib ORIF.    Assessment/Plan:     * Multiple closed fractures of ribs of left side  Pulmonary contusion     2/2 fall from horse.  Status post rib plating  Continue Lidoderm patch.  Continue intravenous Zosyn antibiotics.  Continue incentive spirometry.  Follow Dr. Garrett recommendations.       DAYANNA (generalized anxiety disorder)     Chronic problem, with acute decompensation  Increase clonopin to TID prn     Paroxysmal atrial fibrillation     S/p Ablation. Not on chronic anticoagulation therapy  Cardiac monitoring     Morbid obesity     Body mass index is 46.25 kg/m². Morbid obesity complicates all aspects of disease management from diagnostic modalities to treatment. Weight loss encouraged and health benefits explained to patient.      MANUELA on CPAP     Chronic issues  OK to use home CPAP.      Hypertension  Stable, off Norvasc and lisinopril.  Monitor blood pressure closely.    Staph bacteremia - possibility of contamination exist  Discontinue vancomycin.    Discussed with patient's wife.  Patient encouraged to increase ambulation. Patient may be transferred to medicine telemetry floor once okay with Dr. Garrett.  Possible discharge home tomorrow once okay with Dr. Garrett.    VTE Risk Mitigation (From admission, onward)        Ordered     enoxaparin injection 40 mg  Every 12 hours      03/14/19 1022     IP VTE HIGH RISK PATIENT  Once      03/09/19 1830        uSsan Toure MD  Department of Hospital Medicine   [facility]

## 2019-03-19 NOTE — CONSULTS
Bird Feng [id number] is a 54 y.o. male who has been consulted for vancomycin dosing.    Pharmacy consult for vancomycin dosing is no longer required.  Vancomycin was discontinued.    Thank you for allowing us to participate in this patient's care.     Elissa Mcwilliams, KarenaD

## 2019-03-19 NOTE — NURSING
[id number]:  Assisted pt back to bed.  Minimal assistance needed.  MD huber'd additional dose of oxycodone.  Pt settled in bed.  No distress.

## 2019-03-19 NOTE — PLAN OF CARE
Problem: Adult Inpatient Plan of Care  Goal: Plan of Care Review  Outcome: Ongoing (interventions implemented as appropriate)  No acute events overnight.  Pt up and ambulated around unit without difficulty.  Pt requested to stand and stretch in middle of night and then up to chair this AM.  Clonopin and Oxycodone requested whenever available and given as ordered.  Dilaudid requested this AM and administered. Miconazole applied to rashes in skin fold and Calmoseptine applied to other areas of irritation. Plan for removal of chest tube today per Dr. Arroyo.  Minimal output from both NICHOLE drain (30mL) and moderated output from chest tube (130mL).  Voids per urinal with assistance holding, went with not problems.  Full bath given and sheets changed, will continue to monitor closely.

## 2019-03-19 NOTE — PROGRESS NOTES
03/19/19 0723   Incentive Spirometer   $ Incentive Spirometer Charges done with encouragement   Incentive Spirometer Predicted Level (mL) 3000   Administration (IS) proper technique demonstrated   Number of Repetitions (IS) 12   Level Incentive Spirometer (mL) 2000   Patient Tolerance (IS) good

## 2019-03-19 NOTE — NURSING
Pt transferred to unit from ICU.  Vitals stable.  He denies pain and discomfort at this time.  He is in good spirits, looking forward to leaving on tomorrow.  Antibiotic therapy continues.  Side rails up times two, bed low and locked, call light within reach. Wife at bedside.

## 2019-03-19 NOTE — PLAN OF CARE
03/18/19 1922   Patient Assessment/Suction   Level of Consciousness (AVPU) alert   Respiratory Effort Normal;Unlabored   Expansion/Accessory Muscles/Retractions no use of accessory muscles   All Lung Fields Breath Sounds Anterior:;clear;equal bilaterally   Rhythm/Pattern, Respiratory unlabored   PRE-TX-O2   O2 Device (Oxygen Therapy) room air   SpO2 99 %   Pulse 93   Resp (!) 28   Incentive Spirometer   $ Incentive Spirometer Charges done with encouragement   Administration (IS) proper technique demonstrated   Number of Repetitions (IS) 10   Level Incentive Spirometer (mL) 1900   Patient Tolerance (IS) good     Pt is sating 99% on RA. Does IS on his own. 1750-1900ml

## 2019-03-20 VITALS
RESPIRATION RATE: 20 BRPM | WEIGHT: 315 LBS | DIASTOLIC BLOOD PRESSURE: 55 MMHG | HEART RATE: 86 BPM | HEIGHT: 75 IN | TEMPERATURE: 97 F | SYSTOLIC BLOOD PRESSURE: 117 MMHG | OXYGEN SATURATION: 96 % | BODY MASS INDEX: 39.17 KG/M2

## 2019-03-20 LAB
ANION GAP SERPL CALC-SCNC: 10 MMOL/L
BACTERIA BLD CULT: NORMAL
BASOPHILS # BLD AUTO: 0 K/UL
BASOPHILS NFR BLD: 0.4 %
BUN SERPL-MCNC: 14 MG/DL
CALCIUM SERPL-MCNC: 9.2 MG/DL
CHLORIDE SERPL-SCNC: 102 MMOL/L
CO2 SERPL-SCNC: 26 MMOL/L
CREAT SERPL-MCNC: 1 MG/DL
DIFFERENTIAL METHOD: ABNORMAL
EOSINOPHIL # BLD AUTO: 0.3 K/UL
EOSINOPHIL NFR BLD: 2.8 %
ERYTHROCYTE [DISTWIDTH] IN BLOOD BY AUTOMATED COUNT: 14.9 %
EST. GFR  (AFRICAN AMERICAN): >60 ML/MIN/1.73 M^2
EST. GFR  (NON AFRICAN AMERICAN): >60 ML/MIN/1.73 M^2
GLUCOSE SERPL-MCNC: 136 MG/DL
HCT VFR BLD AUTO: 33.3 %
HGB BLD-MCNC: 10.9 G/DL
LYMPHOCYTES # BLD AUTO: 1.6 K/UL
LYMPHOCYTES NFR BLD: 16.5 %
MCH RBC QN AUTO: 28.8 PG
MCHC RBC AUTO-ENTMCNC: 32.7 G/DL
MCV RBC AUTO: 88 FL
MONOCYTES # BLD AUTO: 1 K/UL
MONOCYTES NFR BLD: 9.9 %
NEUTROPHILS # BLD AUTO: 6.8 K/UL
NEUTROPHILS NFR BLD: 70.4 %
PLATELET # BLD AUTO: 376 K/UL
PMV BLD AUTO: 7 FL
POTASSIUM SERPL-SCNC: 4.2 MMOL/L
RBC # BLD AUTO: 3.79 M/UL
SODIUM SERPL-SCNC: 138 MMOL/L
WBC # BLD AUTO: 9.6 K/UL

## 2019-03-20 PROCEDURE — 63600175 PHARM REV CODE 636 W HCPCS: Performed by: NURSE PRACTITIONER

## 2019-03-20 PROCEDURE — 25000003 PHARM REV CODE 250: Performed by: INTERNAL MEDICINE

## 2019-03-20 PROCEDURE — 99900035 HC TECH TIME PER 15 MIN (STAT)

## 2019-03-20 PROCEDURE — 36415 COLL VENOUS BLD VENIPUNCTURE: CPT

## 2019-03-20 PROCEDURE — 25000003 PHARM REV CODE 250: Performed by: NURSE PRACTITIONER

## 2019-03-20 PROCEDURE — 80048 BASIC METABOLIC PNL TOTAL CA: CPT

## 2019-03-20 PROCEDURE — 85025 COMPLETE CBC W/AUTO DIFF WBC: CPT

## 2019-03-20 PROCEDURE — 63600175 PHARM REV CODE 636 W HCPCS: Performed by: INTERNAL MEDICINE

## 2019-03-20 PROCEDURE — 94761 N-INVAS EAR/PLS OXIMETRY MLT: CPT

## 2019-03-20 PROCEDURE — 97116 GAIT TRAINING THERAPY: CPT

## 2019-03-20 RX ORDER — HYDROCODONE BITARTRATE AND ACETAMINOPHEN 10; 325 MG/1; MG/1
1 TABLET ORAL EVERY 6 HOURS PRN
Qty: 20 TABLET | Refills: 0 | Status: SHIPPED | OUTPATIENT
Start: 2019-03-20 | End: 2022-07-08

## 2019-03-20 RX ORDER — AMOXICILLIN 250 MG
1 CAPSULE ORAL 2 TIMES DAILY PRN
COMMUNITY
Start: 2019-03-20 | End: 2022-07-08

## 2019-03-20 RX ADMIN — PIPERACILLIN SODIUM AND TAZOBACTAM SODIUM 4.5 G: 4; .5 INJECTION, POWDER, LYOPHILIZED, FOR SOLUTION INTRAVENOUS at 04:03

## 2019-03-20 RX ADMIN — PIPERACILLIN SODIUM AND TAZOBACTAM SODIUM 4.5 G: 4; .5 INJECTION, POWDER, LYOPHILIZED, FOR SOLUTION INTRAVENOUS at 01:03

## 2019-03-20 RX ADMIN — MICONAZOLE NITRATE: 20 OINTMENT TOPICAL at 09:03

## 2019-03-20 RX ADMIN — DIPHENHYDRAMINE HYDROCHLORIDE 12.5 MG: 50 INJECTION, SOLUTION INTRAMUSCULAR; INTRAVENOUS at 09:03

## 2019-03-20 RX ADMIN — CLONAZEPAM 1 MG: 1 TABLET ORAL at 04:03

## 2019-03-20 RX ADMIN — SENNOSIDES AND DOCUSATE SODIUM 1 TABLET: 8.6; 5 TABLET ORAL at 09:03

## 2019-03-20 RX ADMIN — HYDROCORTISONE: 25 CREAM TOPICAL at 09:03

## 2019-03-20 RX ADMIN — IBUPROFEN 600 MG: 600 TABLET ORAL at 09:03

## 2019-03-20 RX ADMIN — LIDOCAINE 1 PATCH: 50 PATCH TOPICAL at 09:03

## 2019-03-20 RX ADMIN — OXYCODONE HYDROCHLORIDE 15 MG: 10 TABLET ORAL at 01:03

## 2019-03-20 RX ADMIN — DIPHENHYDRAMINE HYDROCHLORIDE 12.5 MG: 50 INJECTION, SOLUTION INTRAMUSCULAR; INTRAVENOUS at 03:03

## 2019-03-20 RX ADMIN — ENOXAPARIN SODIUM 40 MG: 100 INJECTION SUBCUTANEOUS at 09:03

## 2019-03-20 RX ADMIN — OXYCODONE HYDROCHLORIDE 15 MG: 10 TABLET ORAL at 05:03

## 2019-03-20 NOTE — NURSING
Pt's medication and discharge instructions given and reviewed, understanding verbalized.  PIV and telemetry monitor removed.  Vitals stable.  Discharged with his wife.

## 2019-03-20 NOTE — PLAN OF CARE
03/20/19 0451   Patient Assessment/Suction   Level of Consciousness (AVPU) alert   Respiratory Effort Normal;Unlabored   Expansion/Accessory Muscles/Retractions no use of accessory muscles   PRE-TX-O2   O2 Device (Oxygen Therapy) room air   SpO2 (!) 93 %   Pulse 87   Resp 16   Temp 97.3 °F (36.3 °C)   /60   Incentive Spirometer   $ Incentive Spirometer Charges done independently per patient   Administration (IS) self-administered   Number of Repetitions (IS) 10   Level Incentive Spirometer (mL) 2000   Patient Tolerance (IS) good   Pt tolerates RA well, and has great effort with IS.

## 2019-03-20 NOTE — PROGRESS NOTES
03/20/19 0934   Home Oxygen Qualification   Room Air SpO2 At Rest 94 %   Room Air SpO2 on Exertion 94 %   SpO2 on Recovery 96 %   Recovery Heart Rate 104 bpm   Home O2 Eval Comments Does not qualify for home 02

## 2019-03-20 NOTE — PLAN OF CARE
03/20/19 0934   PRE-TX-O2   O2 Device (Oxygen Therapy) room air   SpO2 (!) 94 %   Pulse Oximetry Type Intermittent   $ Pulse Oximetry - Multiple Charge Pulse Oximetry - Multiple   Home Oxygen Qualification   Room Air SpO2 At Rest 94 %   Room Air SpO2 on Exertion 94 %   SpO2 on Recovery 96 %   Recovery Heart Rate 104 bpm   Home O2 Eval Comments Does not qualify for home 02

## 2019-03-20 NOTE — PLAN OF CARE
Problem: Physical Therapy Goal  Goal: Physical Therapy Goal  Goals to be met by: 2019     Patient will increase functional independence with mobility by performin. Sit to stand transfer with Contact Guard Assistance  2. Bed to chair transfer with Contact Guard Assistance   3. Gait  x 250x2 feet with Contact Guard Assistance using Rolling Walker/HHA    4. Lower extremity exercise program x20 reps      Outcome: Ongoing (interventions implemented as appropriate)  PT for gait training

## 2019-03-20 NOTE — PT/OT/SLP PROGRESS
Physical Therapy Treatment    Patient Name:  Bird Feng   MRN:  [id number]    Recommendations:     Discharge Recommendations:  home       Assessment:     Bird Feng is a 54 y.o. male admitted with a medical diagnosis of Multiple closed fractures of ribs of left side.  He presents with the following impairments/functional limitations:  weakness, impaired self care skills, impaired functional mobilty, gait instability, impaired balance, orthopedic precautions .    Rehab Prognosis: Good; patient would benefit from acute skilled PT services to address these deficits and reach maximum level of function.    Recent Surgery: Procedure(s) (LRB):  ORIF, FRACTURE, RIBS (Left) 5 Days Post-Op    Plan:     During this hospitalization, patient to be seen 6 x/week to address the identified rehab impairments via gait training, therapeutic activities, therapeutic exercises and progress toward the following goals:    · Plan of Care Expires:  04/05/19    Subjective     Chief Complaint: generalized pain with mobility 2' rib fxs  Patient/Family Comments/goals: eager to ambulate, eager to go home  Pain/Comfort:  · Pain Rating 1: (did not rate)  · Location - Side 1: Left  · Location 1: chest  · Pain Addressed 1: Reposition, Nurse notified      Objective:     Communicated with nurse Recinos prior to session.  Patient found supine with telemetry upon PT entry to room.     General Precautions: Standard, fall   Orthopedic Precautions:(multiple L rib fx 2,3,4,5)   Braces: N/A     Functional Mobility:  · Bed Mobility:     · Supine to Sit: minimum assistance, moderate assistance and 2' rib pain    · Transfers:     · Sit to Stand:  modified independence with rolling walker    · Gait: 250' with SBA using RW. RT present for home O2 eval        Patient left seated EOB with multiple family members present with all lines intact, call button in reach and nurse notified..    GOALS:   Multidisciplinary Problems     Physical Therapy Goals        Problem:  Physical Therapy Goal    Goal Priority Disciplines Outcome Goal Variances Interventions   Physical Therapy Goal     PT, PT/OT Ongoing (interventions implemented as appropriate)     Description:  Goals to be met by: 2019     Patient will increase functional independence with mobility by performin. Sit to stand transfer with Contact Guard Assistance  2. Bed to chair transfer with Contact Guard Assistance   3. Gait  x 250x2 feet with Contact Guard Assistance using Rolling Walker/HHA    4. Lower extremity exercise program x20 reps                       Time Tracking:     PT Received On: 19  PT Start Time: 920     PT Stop Time: 932  PT Total Time (min): 12 min     Billable Minutes: Gait Training 12    Treatment Type: Treatment  PT/PTA: PTA     PTA Visit Number: 1     Ashely Crocker PTA  2019

## 2019-03-20 NOTE — DISCHARGE SUMMARY
Discharge Summary  Hospital Medicine    Admit Date: 3/9/2019    Date and Time: 3/20/33891:27 AM    Discharge Attending Physician: Susan Toure MD    Primary Care Physician: Jared Villa MD    Diagnoses:  Active Hospital Problems    Diagnosis  POA    *Multiple closed fractures of ribs of left side [S22.42XA] status post rib plating  Yes    DAYANNA (generalized anxiety disorder) [F41.1]  Unknown    Morbid obesity [E66.01]  Yes    MANUELA on CPAP [G47.33, Z99.89]  Not Applicable    Paroxysmal atrial fibrillation [I48.0]  Hypertension  Staph coagulase negative bacteremia (contamination type)  Yes     Discharged Condition: Good    Hospital Course:   54 y.o. male  has a past medical history of Atrial fibrillation (2006), Atrial fibrillation, Hypertension, and Sleep apnea who presented to the ED via EMS for evaluation of left shoulder pain. Pt reported being in his normal state of health until just prior to arrival when he began experiencing severe L shoulder pain after falling from horse. In the ED pt was treated with multiple doses of IV dilaudid before experiencing any relief. Imaging preformed in the ED showed multiple rib fractures. Patient was admitted to Hospitalist medicine service. Patient was evaluated by Dr. Garrett who later performed left multiple rib plating procedure/ ORIF.  Patient tolerated procedure well.  Postoperatively a small left pneumothorax was closely observed which resolved.  Pain come true old markedly improved.  Patient was cleared for discharge per Dr. Garrett.  Patient instructed to have a close follow-up with Dr. Garrett and continue aggressive incentive spirometry with use of oral narcotics.  Patient instructed to avoid riding horses again.  During hospital stay during acute pain phase, patient was noted to have high blood pressure which later on normalized without use of antihypertensive agents.  Patient instructed to keep blood pressure log. Patient was discharged home in stable  condition with following discharge plan of care. Total time with the patient was 30 minutes and greater than 50% was spent in counseling and coordination of care. The assessment and plan have been discussed at length. Physicians' notes reviewed. Labs and procedure reviewed.     Consults: Dr. Garrett    Significant Diagnostic Studies:   CT scan of the chest without contrast:  Acute fractures of the left 2nd through 8th ribs.  Edge to edge apposition of the lower ribs with bayonet shortening of the 5th rib.  There is  subcutaneous emphysema however a pneumothorax is not seen.  Three small bands of pulmonary contusion noted in the left upper lobe.  Other fractures are not identified.  Mediastinal hemorrhage or hematoma is not seen.     Left scapula x-ray:  Negative plain x-rays of the left scapula.  Acute fractures of the left 2nd 3rd 4th and 5th ribs with subcutaneous emphysema and possible pneumothorax.     Left shoulder x-ray:  Acute fractures of the left 2nd 3rd 4th and 5th ribs with a possible small left apical pneumothorax and a small amount of subcutaneous emphysema.  A fracture of the scapula or of the shoulder itself is not seen.  Dislocation is not noted.      Left rib series:  Multiple displaced and angulated left rib fractures, similar in alignment comparing across modalities.  Pulmonary contusion in the left midlung zone.     Chest x-ray:  1. Trace left pneumothorax with chest tubes in place.  2. Improved alignment post internal fixation of several left rib fractures.  3. Cardiomegaly.  4. Mild pulmonary edema.  Left midlung contusion.     Chest x-ray:  Left apical pneumothorax is unchanged with 2 chest tubes in place.  Resolving left pulmonary contusion.  Multiple left rib fractures, some with internal fixation.     Chest x-ray:  Right lung is clear.  Left midlung zone contusion less conspicuous.  Unchanged cardiac enlargement.  No pleural effusion.  Trace left apical pneumothorax with chest tube in  place.  Multiple left rib fractures several which have undergone internal fixation.     Chest x-ray:  Small left apical pneumothorax with chest tube in place, very mildly increased in size.  Multiple left rib ORIF.     CXR:  Very tiny left apical pneumothorax.    CXR:  Prior left rib fracture status post ORIF.  Mild cardiomegaly.  Left pneumothorax has resolved.    Microbiology Results (last 7 days)     Procedure Component Value Units Date/Time    Blood culture [[id number]] Collected:  03/17/19 0512    Order Status:  Completed Specimen:  Blood from Peripheral, Right Updated:  03/19/19 1212     Blood Culture, Routine No Growth to date     Blood Culture, Routine No Growth to date     Blood Culture, Routine No Growth to date    Narrative:       Aerobic and anaerobic  Collection has been rescheduled by [facility] at 03/16/2019 21:24 Reason:   nurse will call when to draw patient blood  Collection has been rescheduled by [facility] at 03/16/2019 21:24 Reason:   nurse will call when to draw patient blood    Blood culture [[id number]] Collected:  03/17/19 0430    Order Status:  Completed Specimen:  Blood from Peripheral, Right  Hand Updated:  03/19/19 0921     Blood Culture, Routine Gram stain aer bottle: Gram positive cocci in clusters resembling Staph      Blood Culture, Routine Results called to and read back by: Dulce Sandhu RN 03/18/2019  06:45     Blood Culture, Routine --     COAGULASE-NEGATIVE STAPHYLOCOCCUS SPECIES  Organism is a probable contaminant      Narrative:       Aerobic and anaerobic  Collection has been rescheduled by [facility] at 03/16/2019 21:24 Reason:   nurse will call when to draw patient blood  Collection has been rescheduled by [facility] at 03/16/2019 21:24 Reason:   nurse will call when to draw patient blood        Special Treatments/Procedures: as above  Disposition: Home or Self Care    Medications:  Reconciled Home Medications:   Current Discharge Medication List      START taking these medications    Details    HYDROcodone-acetaminophen (NORCO)  mg per tablet Take 1 tablet by mouth every 6 (six) hours as needed for Pain.  Qty: 20 tablet, Refills: 0      senna-docusate 8.6-50 mg (PERICOLACE) 8.6-50 mg per tablet Take 1 tablet by mouth 2 (two) times daily as needed for Constipation.         CONTINUE these medications which have NOT CHANGED    Details   clonazePAM (KLONOPIN) 1 MG tablet Take 1 tablet (1 mg total) by mouth 2 (two) times daily as needed for Anxiety.  Qty: 30 tablet, Refills: 2         STOP taking these medications       lisinopril 10 MG tablet Comments:   Reason for Stopping:             Discharge Procedure Orders   Diet Cardiac     Other restrictions (specify):   Order Comments: PLEASE OBSERVE FALL PRECAUTIONS     Call MD for:   Order Comments: For worsening symptoms, chest pain, shortness of breath, increased abdominal pain, high grade fever, stroke or stroke like symptoms, immediately go to the nearest Emergency Room or call 911 as soon as possible.     Follow-up Information     Jared Villa MD In 1 week.    Specialty:  Family Medicine  Contact information:  [address] [address]  [phone]             Eric Garrett MD In 2 weeks.    Specialty:  Cardiothoracic Surgery  Contact information:  [address] [address]  [phone]             Please follow up.    Contact information:  Continue use of incentive spirometry as directed.

## 2019-03-20 NOTE — PHYSICIAN QUERY
"PT Name: Bird Feng  MR #: [id number]    Physician Query Form - Hematology Clarification      CDS/: Fabiana Perdomo               Contact information:[phone]    This form is a permanent document in the medical record.      Query Date: March 20, 2019    By submitting this query, we are merely seeking further clarification of documentation. Please utilize your independent clinical judgment when addressing the question(s) below.    The Medical record contains the following:   Indicators  Supporting Clinical Findings Location in Medical Record    "Anemia" documented      x H & H =  14.6 & 44.3--->12.5 & 37.8--->11.9 & 35.6   11.7 & 35.2---->11.2 & 34.0-->10.9 & 33.3   Lab Results 3/10,3/16, 3/17,   3/18,3/19 & 3/20/2019    x BP =                     HR=  BP=()/ ( 51-93)   HR=  Vital Signs Flow Sheet 3/18, 3/19 & 3/20/2019    "GI bleeding" documented      Acute bleeding (Non GI site)      x Transfusion(s)      x Treatment:  CBC auto differential Daily   MD Orders 3/11/2019    x Other:   status post left ribs open reduction internal fixation surgery     PRBC= 600 mL  Blood Loss = 1600 mL    Hospital Medicine Progress Notes 3/18/2019    Anesthesia Information 3/15/2019     Provider, please specify diagnosis or diagnoses associated with above clinical findings.    [  ] Acute blood loss anemia expected post-operatively   [  ] Acute blood loss anemia     [  ] Other Hematological Diagnosis (please specify):     [ x ] Clinically Undetermined       Please document in your progress notes daily for the duration of treatment, until resolved, and include in your discharge summary.                                                                                                      "

## 2019-03-21 ENCOUNTER — PATIENT OUTREACH (OUTPATIENT)
Dept: ADMINISTRATIVE | Facility: CLINIC | Age: 54
End: 2019-03-21

## 2019-03-21 NOTE — PROGRESS NOTES
C3 nurse attempted to contact patient. No answer. The following message was left for the patient to return the call:  Good morning I am a nurse calling on behalf of [facility] from the [facility].  This is a Transitional Care Call for Bird Feng. When you have a moment please contact us at [phone] or [phone] Monday through Friday, between the hours of 8 am to 4 pm. We look forward to speaking with you. On behalf of [facility] have a nice day.    The patient does not have a scheduled HOSFU appointment within 7-14 days post hospital discharge date 3/20/19. Non [facility] Provider, unable to route at this time.

## 2019-03-21 NOTE — PLAN OF CARE
03/21/19 0816   Final Note   Assessment Type Final Discharge Note   Anticipated Discharge Disposition Home

## 2019-03-22 LAB — BACTERIA BLD CULT: NORMAL

## 2019-03-22 NOTE — TELEPHONE ENCOUNTER
Place call to Dr. Garrett office @ [phone] regarding patient requesting a call back from physician office regarding change in frequency of pain medication. Spoke with Ron and was informed she will contact patient and advise on frequency of pain medication.

## 2019-03-25 ENCOUNTER — TELEPHONE (OUTPATIENT)
Dept: MEDSURG UNIT | Facility: HOSPITAL | Age: 54
End: 2019-03-25

## 2019-04-08 NOTE — OP NOTE
DATE OF PROCEDURE:  03/15/2019    PREOPERATIVE DIAGNOSIS:  Blunt left chest trauma, multiple rib fractures,   displaced.    POSTOPERATIVE DIAGNOSIS:  Blunt left chest trauma, multiple rib fractures,   displaced.    TITLE OF OPERATION:  Open reduction internal fixation of multiple rib fractures.    SURGEON:  Eric Garrett M.D.    PROCEDURE IN DETAIL:  The patient was taken to the Operating Room, placed in   supine position, and after adequate induction of general anesthesia, was   repositioned with the left side up and prepped and draped in usual sterile   fashion.  Lateral thoracotomy incision was made, taken down to the subcutaneous   tissue.  Hemostasis maintained with electrocautery.  We proceeded down to the   chest wall in a muscle sparing fashion.  After brief exploration, the rib   fractures were identified.  Unfortunately, two of the most displaced fractures   were beneath the scapula making exposure for plating a challenge in addition to   the patient's overall body habitus.  Starting with the lowest rib that was   identified with a significantly displaced fracture, the fracture was reduced   after the rib was mobilized and exposed using periosteal elevator, the   appropriate plate was selected and positioned and then the screw holes were   predrilled and then the screws were inserted.  At least three screws were placed   on either side of the fracture and the end result was quite satisfactory, we   proceeded to the next level up in a similar fashion to that described above went   ahead and fixated the rib and then continued until the 4 ribs that required   reduction and stabilization were addressed.  While working on the highest rib,   but we did encounter some intercostal bleeding, but the intercostal vessel was   identified and controlled with Hemoclip.  We did enter the pleural space in the   process of fixing the ribs.  Therefore, once we were completed, the procedure, a   28-Armenian chest tube was  inserted directed posteriorly and apically and then a   15-Patrice drain was positioned outside the chest wall beneath the muscle.  Both   these tubes were brought out through separate stab incisions and secured to the   skin using heavy silk suture.  We copiously irrigated with antibiotic solution   and then proceeded to close first the subcutaneous tissue.  After the muscle was   returned to its normal anatomic position and tacked into place using   interrupted 0 Vicryl sutures, 2-0 Vicryl running subcutaneous stitch was then   accomplished and then we irrigated again with antibiotic solution and the skin   edges were reapproximated using a 3-0 Vicryl running subcuticular stitch.    Multilevel intercostal nerve block was then performed using 0.5% Marcaine.  Once   this was done, the site was cleaned.  Sterile dressing was applied.  The drains   were attached to Pleur-evac suction and bulb suction.  The patient tolerated   the procedure well and was transported to the Intensive Care Unit in stable   condition.      MELINDA  dd: 04/08/2019 00:15:48 (CDT)  td: 04/08/2019 02:55:08 (CD)  Doc ID   #[id number]  Job ID #[id number]    CC:

## 2020-11-24 ENCOUNTER — HOSPITAL ENCOUNTER (EMERGENCY)
Facility: HOSPITAL | Age: 55
Discharge: HOME | End: 2020-11-24
Attending: EMERGENCY MEDICINE
Payer: COMMERCIAL

## 2020-11-24 VITALS
RESPIRATION RATE: 20 BRPM | HEIGHT: 75 IN | DIASTOLIC BLOOD PRESSURE: 86 MMHG | OXYGEN SATURATION: 99 % | TEMPERATURE: 98 F | WEIGHT: 315 LBS | SYSTOLIC BLOOD PRESSURE: 139 MMHG | BODY MASS INDEX: 39.17 KG/M2 | HEART RATE: 76 BPM

## 2020-11-24 DIAGNOSIS — U07.1 COVID-19: Primary | ICD-10-CM

## 2020-11-24 DIAGNOSIS — R10.11 RUQ ABDOMINAL PAIN: ICD-10-CM

## 2020-11-24 DIAGNOSIS — U07.1 COVID-19 VIRUS DETECTED: ICD-10-CM

## 2020-11-24 LAB
ALBUMIN SERPL BCP-MCNC: 4.2 G/DL (ref 3.5–5.2)
ALP SERPL-CCNC: 71 U/L (ref 55–135)
ALT SERPL W/O P-5'-P-CCNC: 32 U/L (ref 10–44)
ANION GAP SERPL CALC-SCNC: 10 MMOL/L (ref 8–16)
AST SERPL-CCNC: 17 U/L (ref 10–40)
BASOPHILS # BLD AUTO: 0.01 K/UL (ref 0–0.2)
BASOPHILS NFR BLD: 0.3 % (ref 0–1.9)
BILIRUB SERPL-MCNC: 0.7 MG/DL (ref 0.1–1)
BNP SERPL-MCNC: 8 PG/ML (ref 0–99)
BUN SERPL-MCNC: 11 MG/DL (ref 6–20)
CALCIUM SERPL-MCNC: 9.2 MG/DL (ref 8.7–10.5)
CHLORIDE SERPL-SCNC: 106 MMOL/L (ref 95–110)
CO2 SERPL-SCNC: 24 MMOL/L (ref 23–29)
CREAT SERPL-MCNC: 1 MG/DL (ref 0.5–1.4)
DIFFERENTIAL METHOD: ABNORMAL
EOSINOPHIL # BLD AUTO: 0 K/UL (ref 0–0.5)
EOSINOPHIL NFR BLD: 0.3 % (ref 0–8)
ERYTHROCYTE [DISTWIDTH] IN BLOOD BY AUTOMATED COUNT: 13.5 % (ref 11.5–14.5)
EST. GFR  (AFRICAN AMERICAN): >60 ML/MIN/1.73 M^2
EST. GFR  (NON AFRICAN AMERICAN): >60 ML/MIN/1.73 M^2
GLUCOSE SERPL-MCNC: 105 MG/DL (ref 70–110)
HCT VFR BLD AUTO: 44.5 % (ref 40–54)
HGB BLD-MCNC: 14.5 G/DL (ref 14–18)
IMM GRANULOCYTES # BLD AUTO: 0.01 K/UL (ref 0–0.04)
IMM GRANULOCYTES NFR BLD AUTO: 0.3 % (ref 0–0.5)
INR PPP: 1.1
LIPASE SERPL-CCNC: 31 U/L (ref 4–60)
LYMPHOCYTES # BLD AUTO: 1 K/UL (ref 1–4.8)
LYMPHOCYTES NFR BLD: 24.9 % (ref 18–48)
MCH RBC QN AUTO: 28.7 PG (ref 27–31)
MCHC RBC AUTO-ENTMCNC: 32.6 G/DL (ref 32–36)
MCV RBC AUTO: 88 FL (ref 82–98)
MONOCYTES # BLD AUTO: 0.6 K/UL (ref 0.3–1)
MONOCYTES NFR BLD: 14.4 % (ref 4–15)
NEUTROPHILS # BLD AUTO: 2.3 K/UL (ref 1.8–7.7)
NEUTROPHILS NFR BLD: 59.8 % (ref 38–73)
NRBC BLD-RTO: 0 /100 WBC
PLATELET # BLD AUTO: 177 K/UL (ref 150–350)
PMV BLD AUTO: 10 FL (ref 9.2–12.9)
POTASSIUM SERPL-SCNC: 4.4 MMOL/L (ref 3.5–5.1)
PROT SERPL-MCNC: 7.3 G/DL (ref 6–8.4)
PROTHROMBIN TIME: 13.2 SEC (ref 10.6–14.8)
RBC # BLD AUTO: 5.05 M/UL (ref 4.6–6.2)
SARS-COV-2 RDRP RESP QL NAA+PROBE: POSITIVE
SODIUM SERPL-SCNC: 140 MMOL/L (ref 136–145)
TROPONIN I SERPL DL<=0.01 NG/ML-MCNC: <0.03 NG/ML
TROPONIN I SERPL DL<=0.01 NG/ML-MCNC: <0.03 NG/ML
WBC # BLD AUTO: 3.81 K/UL (ref 3.9–12.7)

## 2020-11-24 PROCEDURE — 36415 COLL VENOUS BLD VENIPUNCTURE: CPT

## 2020-11-24 PROCEDURE — 83880 ASSAY OF NATRIURETIC PEPTIDE: CPT

## 2020-11-24 PROCEDURE — 85610 PROTHROMBIN TIME: CPT

## 2020-11-24 PROCEDURE — 63600175 PHARM REV CODE 636 W HCPCS: Performed by: EMERGENCY MEDICINE

## 2020-11-24 PROCEDURE — 85025 COMPLETE CBC W/AUTO DIFF WBC: CPT

## 2020-11-24 PROCEDURE — 80053 COMPREHEN METABOLIC PANEL: CPT

## 2020-11-24 PROCEDURE — 25500020 PHARM REV CODE 255: Performed by: EMERGENCY MEDICINE

## 2020-11-24 PROCEDURE — 93010 ELECTROCARDIOGRAM REPORT: CPT | Mod: ,,, | Performed by: SPECIALIST

## 2020-11-24 PROCEDURE — 84484 ASSAY OF TROPONIN QUANT: CPT

## 2020-11-24 PROCEDURE — U0002 COVID-19 LAB TEST NON-CDC: HCPCS

## 2020-11-24 PROCEDURE — 99285 EMERGENCY DEPT VISIT HI MDM: CPT | Mod: 25

## 2020-11-24 PROCEDURE — 96375 TX/PRO/DX INJ NEW DRUG ADDON: CPT

## 2020-11-24 PROCEDURE — 83690 ASSAY OF LIPASE: CPT

## 2020-11-24 PROCEDURE — 93010 EKG 12-LEAD: ICD-10-PCS | Mod: ,,, | Performed by: SPECIALIST

## 2020-11-24 PROCEDURE — 84484 ASSAY OF TROPONIN QUANT: CPT | Mod: 91

## 2020-11-24 PROCEDURE — 96374 THER/PROPH/DIAG INJ IV PUSH: CPT

## 2020-11-24 PROCEDURE — 93005 ELECTROCARDIOGRAM TRACING: CPT | Performed by: SPECIALIST

## 2020-11-24 RX ORDER — DEXAMETHASONE SODIUM PHOSPHATE 4 MG/ML
6 INJECTION, SOLUTION INTRA-ARTICULAR; INTRALESIONAL; INTRAMUSCULAR; INTRAVENOUS; SOFT TISSUE
Status: COMPLETED | OUTPATIENT
Start: 2020-11-24 | End: 2020-11-24

## 2020-11-24 RX ORDER — MORPHINE SULFATE 2 MG/ML
2 INJECTION, SOLUTION INTRAMUSCULAR; INTRAVENOUS
Status: COMPLETED | OUTPATIENT
Start: 2020-11-24 | End: 2020-11-24

## 2020-11-24 RX ADMIN — IOHEXOL 100 ML: 350 INJECTION, SOLUTION INTRAVENOUS at 03:11

## 2020-11-24 RX ADMIN — DEXAMETHASONE SODIUM PHOSPHATE 6 MG: 4 INJECTION, SOLUTION INTRA-ARTICULAR; INTRALESIONAL; INTRAMUSCULAR; INTRAVENOUS; SOFT TISSUE at 05:11

## 2020-11-24 RX ADMIN — MORPHINE SULFATE 2 MG: 2 INJECTION, SOLUTION INTRAMUSCULAR; INTRAVENOUS at 03:11

## 2020-11-24 NOTE — ED PROVIDER NOTES
Encounter Date: 11/24/2020       History     Chief Complaint   Patient presents with    Chest Pain     X 2 DAYS    Back Pain     UNDER RT SHOULDER BLADE     55-year-old male presents complaining of right upper quadrant abdominal pain, patient has a past medical history of atrial fibrillation, hypertension, obesity patient was triaged as chest pain but reports pain is not located in his chest it is the right upper quadrant of the abdomen.  Patient denies fever cough or shortness of breath patient denies chest pain patient reports 8/10 abdominal pain starting 2 days ago with radiation to his right shoulder blade.        Review of patient's allergies indicates:  No Known Allergies  Past Medical History:   Diagnosis Date    Atrial fibrillation 2006    Cardioversion    Atrial fibrillation     Hypertension     Obese     Sleep apnea      Past Surgical History:   Procedure Laterality Date    CARDIOVERSION  2006    X2    OPEN REDUCTION AND INTERNAL FIXATION (ORIF) OF FRACTURE OF RIB Left 3/15/2019    Procedure: ORIF, FRACTURE, RIBS;  Surgeon: Eric Garrett MD;  Location: [facility];  Service: Cardiothoracic;  Laterality: Left;    TONSILLECTOMY       Family History   Problem Relation Age of Onset    Hypertension Mother     Hypertension Father     Heart disease Father      Social History     Tobacco Use    Smoking status: Never Smoker    Smokeless tobacco: Never Used   Substance Use Topics    Alcohol use: Yes     Alcohol/week: 4.2 standard drinks     Types: 5 drink(s) per week     Comment: Socially    Drug use: No     Review of Systems   Constitutional: Negative for fever.   HENT: Negative for congestion, rhinorrhea, sore throat and trouble swallowing.    Eyes: Negative for visual disturbance.   Respiratory: Negative for cough, chest tightness, shortness of breath and wheezing.    Cardiovascular: Negative for chest pain, palpitations and leg swelling.   Gastrointestinal: Positive for abdominal pain. Negative  for abdominal distention, constipation, diarrhea, nausea and vomiting.   Genitourinary: Negative for difficulty urinating, dysuria, flank pain and frequency.   Musculoskeletal: Negative for arthralgias, back pain, joint swelling and neck pain.   Skin: Negative for color change and rash.   Neurological: Negative for dizziness, syncope, speech difficulty, weakness, numbness and headaches.   All other systems reviewed and are negative.      Physical Exam     Initial Vitals [11/24/20 1201]   BP Pulse Resp Temp SpO2   (!) 140/82 75 20 98.5 °F (36.9 °C) 98 %      MAP       --         Physical Exam    Nursing note and vitals reviewed.  Constitutional: He appears well-developed and well-nourished. He is not diaphoretic. No distress.   HENT:   Head: Normocephalic and atraumatic.   Right Ear: External ear normal.   Left Ear: External ear normal.   Nose: Nose normal.   Mouth/Throat: Oropharynx is clear and moist. No oropharyngeal exudate.   Eyes: Conjunctivae and EOM are normal. Pupils are equal, round, and reactive to light. Right eye exhibits no discharge. Left eye exhibits no discharge. No scleral icterus.   Neck: Normal range of motion. Neck supple. No thyromegaly present. No tracheal deviation present. No JVD present.   Cardiovascular: Normal rate, regular rhythm, normal heart sounds and intact distal pulses. Exam reveals no gallop and no friction rub.    No murmur heard.  Pulmonary/Chest: Breath sounds normal. No stridor. No respiratory distress. He has no wheezes. He has no rhonchi. He has no rales. He exhibits no tenderness.   Abdominal: Soft. Bowel sounds are normal. He exhibits no distension and no mass. There is abdominal tenderness. There is no rebound and no guarding.   Tenderness to palpation to the right upper quadrant   Musculoskeletal: Normal range of motion. No tenderness or edema.   Lymphadenopathy:     He has no cervical adenopathy.   Neurological: He is alert and oriented to person, place, and time. He has  normal strength and normal reflexes. He displays normal reflexes. No cranial nerve deficit or sensory deficit.   Skin: Skin is warm and dry. No rash noted. No erythema.         ED Course   Procedures  Labs Reviewed   CBC W/ AUTO DIFFERENTIAL - Abnormal; Notable for the following components:       Result Value    WBC 3.81 (*)     All other components within normal limits   SARS-COV-2 RNA AMPLIFICATION, QUAL - Abnormal; Notable for the following components:    SARS-CoV-2 RNA, Amplification, Qual Positive (*)     All other components within normal limits    Narrative:     KARLY MEHTA RN, ED COVID-19 TEST = POSITIVE by JB3 11/24/2020   15:46   COMPREHENSIVE METABOLIC PANEL   B-TYPE NATRIURETIC PEPTIDE   TROPONIN I   PROTIME-INR   LIPASE   LIPASE   TROPONIN I        ECG Results          EKG 12-lead (Final result)  Result time 11/24/20 19:19:49    Final result by Interface, Lab In [facility] (11/24/20 19:19:49)                 Narrative:    Test Reason : R07.9,    Vent. Rate : 071 BPM     Atrial Rate : 071 BPM     P-R Int : 204 ms          QRS Dur : 098 ms      QT Int : 396 ms       P-R-T Axes : 060 083 045 degrees     QTc Int : 430 ms    Normal sinus rhythm with 1st degree A-V block    Incomplete right bundle branch block  Borderline Abnormal ECG  When compared with ECG of 14-NOV-2013 14:59,  Incomplete right bundle branch block is now Present  Confirmed by Arsh BAZAN, Jourdan CESAR ([id number]) on 11/24/2020 7:19:36 PM    Referred By:             Confirmed By:Juordan Caban MD                            Imaging Results          CT Abdomen Pelvis With Contrast (Final result)  Result time 11/24/20 15:15:24    Final result by Michele Kerr MD (11/24/20 15:15:24)                 Narrative:    CMS MANDATED QUALITY DATA - CT RADIATION  436    All CT scans at this facility utilize dose modulation, iterative  reconstruction, and/or weight based dosing when appropriate to reduce  radiation dose to as low as reasonably  achievable.    CT ABDOMEN PELVIS WITH CONTRAST    CLINICAL HISTORY:  55 years Male Biliary obstruction suspected (Ped 0-18y)    COMPARISON: Abdominal sonogram performed earlier today    FINDINGS: Right lung base is clear. Irregular lingular nodular opacity  measures 17 x 14 mm (image 16). Bone window images demonstrate no  acute or aggressive osseous abnormality.    Low hepatic attenuation compatible with steatosis. Focal fat  deposition adjacent to fissure for ligamentum teres. No focal hepatic  lesion. Gallbladder and biliary tree are unremarkable. Spleen,  pancreas, and adrenal glands are unremarkable. No renal calculi or  hydronephrosis. Ureters are normal in caliber. Urinary bladder is  collapsed.    Stomach is collapsed. No evidence of small bowel obstruction. Normal  appendix. Distal colonic diverticula. No evidence of acute colitis. No  free fluid or free air within the abdomen or pelvis. No pathologically  enlarged abdominopelvic lymph nodes. Fat-containing left inguinal  hernia.    IMPRESSION:    Negative for intrahepatic or extrahepatic bile duct dilation.    No CT evidence of acute abdominopelvic pathology.    Hepatic steatosis.    Nodular lingular opacity could reflect atelectasis/scarring, however  neoplasm cannot be excluded. If no prior chest imaging is available  for comparison, **follow-up chest CT in 1-2 months is recommended**.    Distal colonic diverticula and other incidental findings as above.    Electronically Signed by Michele KEEN on 11/24/2020 3:25 PM                             US Abdomen Limited (Gallbladder) (Final result)  Result time 11/24/20 14:12:12    Final result by Guido Lopez MD (11/24/20 14:12:12)                 Narrative:    Abdominal ultrasound limited    CLINICAL DATA: Abdominal pain    FINDINGS: Sonographic assessment targeted to the right upper quadrant  was performed.    The pancreas and midline retroperitoneum are obscured by bowel gas.    The liver is  echogenic consistent with fatty infiltration. Hepatopedal  flow is noted within the portal vein. No mass or contour abnormality  is identified.    The gallbladder is partially contracted but otherwise normal in  appearance. Common bile duct measures up to 5 mm. The right kidney has  a normal appearance. There is no right upper quadrant free fluid.    IMPRESSION:  1. Hepatic steatosis.  2. Poor visualization of the pancreas and midline retroperitoneum.  3. No other significant findings.    Electronically Signed by Dayday Lopez M.D. on 11/24/2020 2:22 PM                             X-Ray Chest AP Portable (Final result)  Result time 11/24/20 12:29:54    Final result by Robin Sweet MD (11/24/20 12:29:54)                 Narrative:    REASON: CHEST PAIN Rt side chest pain radiating into shoulder blade    FINDINGS:    Portable chest radiograph with no comparison. The cardiomediastinal  silhouette is within normal limits in size.The pulmonary vascular  structures are within normal limits. The lungs are clear. Plate-screw  fixation of multiple left ribs noted. No acute osseous abnormality.    IMPRESSION:    No acute cardiopulmonary process.    Electronically Signed by Robin Sweet on 11/24/2020 12:37 PM                               Medical Decision Making:   History:   Old Medical Records: I decided to obtain old medical records.  Initial Assessment:   Emergent evaluation of a 55-year-old male presenting with right upper quadrant pain differential diagnosis includes gallstones, obstruction, perforation              Attending Attestation:             Attending ED Notes:   Patient's imaging shows no acute abnormalities of abdomen, patient's labs are within normal limits except patient tested positive for COVID-19, patient reports that his abdominal pain has resolved and he is feeling better.  Patient ambulated and did not desaturate while on room air, regardless I did offer patient observational stay in hospital  given his COVID diagnosis and comorbidities however patient declined.  Patient is alert and oriented x4 he has capacity to make his own medical decisions and patient understands that he can change his mind and return and that he is advised to do so for any worsening or any further concerns patient has a pulse oximeter at home and he is instructed in monitoring for reductions in oxygen saturation.  Patient reports he has no problem returning for treatment should he experience any worsening.  Patient is otherwise advised to contact his primary care doctor and let them know that he is COVID positive and they are to set up tele health visits to monitor his progress.                    Clinical Impression:       ICD-10-CM ICD-9-CM   1. COVID-19  U07.1 079.89   2. RUQ abdominal pain  R10.11 789.01                          ED Disposition Condition    Discharge Stable        ED Prescriptions     None        Follow-up Information     Follow up With Specialties Details Why Contact Info Additional Information    Jared Villa MD Family Medicine Schedule an appointment as soon as possible for a visit in 1 day  [address] [address]  [phone]       [facility]  If symptoms worsen [address] [address]  [phone] 1st floor                                       Sushil Clark MD  11/24/20 [id number]

## 2021-05-10 ENCOUNTER — PATIENT MESSAGE (OUTPATIENT)
Dept: RESEARCH | Facility: HOSPITAL | Age: 56
End: 2021-05-10

## 2022-07-08 ENCOUNTER — HOSPITAL ENCOUNTER (OUTPATIENT)
Dept: RADIOLOGY | Facility: HOSPITAL | Age: 57
Discharge: HOME | End: 2022-07-08
Attending: PHYSICIAN ASSISTANT
Payer: COMMERCIAL

## 2022-07-08 ENCOUNTER — OFFICE VISIT (OUTPATIENT)
Dept: ORTHOPEDICS | Facility: CLINIC | Age: 57
End: 2022-07-08
Payer: COMMERCIAL

## 2022-07-08 VITALS — WEIGHT: 315 LBS | HEIGHT: 75 IN | BODY MASS INDEX: 39.17 KG/M2

## 2022-07-08 DIAGNOSIS — M17.11 ARTHRITIS OF KNEE, RIGHT: ICD-10-CM

## 2022-07-08 DIAGNOSIS — M79.604 PAIN AND SWELLING OF RIGHT LOWER EXTREMITY: Primary | ICD-10-CM

## 2022-07-08 DIAGNOSIS — M79.604 PAIN AND SWELLING OF RIGHT LOWER EXTREMITY: ICD-10-CM

## 2022-07-08 DIAGNOSIS — M79.89 PAIN AND SWELLING OF RIGHT LOWER EXTREMITY: ICD-10-CM

## 2022-07-08 DIAGNOSIS — M79.89 PAIN AND SWELLING OF RIGHT LOWER EXTREMITY: Primary | ICD-10-CM

## 2022-07-08 PROCEDURE — 3008F BODY MASS INDEX DOCD: CPT | Mod: CPTII,S$GLB,, | Performed by: PHYSICIAN ASSISTANT

## 2022-07-08 PROCEDURE — 93971 EXTREMITY STUDY: CPT | Mod: TC,RT

## 2022-07-08 PROCEDURE — 99203 PR OFFICE/OUTPT VISIT, NEW, LEVL III, 30-44 MIN: ICD-10-PCS | Mod: S$GLB,,, | Performed by: PHYSICIAN ASSISTANT

## 2022-07-08 PROCEDURE — 1160F RVW MEDS BY RX/DR IN RCRD: CPT | Mod: CPTII,S$GLB,, | Performed by: PHYSICIAN ASSISTANT

## 2022-07-08 PROCEDURE — 99203 OFFICE O/P NEW LOW 30 MIN: CPT | Mod: S$GLB,,, | Performed by: PHYSICIAN ASSISTANT

## 2022-07-08 PROCEDURE — 1159F PR MEDICATION LIST DOCUMENTED IN MEDICAL RECORD: ICD-10-PCS | Mod: CPTII,S$GLB,, | Performed by: PHYSICIAN ASSISTANT

## 2022-07-08 PROCEDURE — 1160F PR REVIEW ALL MEDS BY PRESCRIBER/CLIN PHARMACIST DOCUMENTED: ICD-10-PCS | Mod: CPTII,S$GLB,, | Performed by: PHYSICIAN ASSISTANT

## 2022-07-08 PROCEDURE — 1159F MED LIST DOCD IN RCRD: CPT | Mod: CPTII,S$GLB,, | Performed by: PHYSICIAN ASSISTANT

## 2022-07-08 PROCEDURE — 3008F PR BODY MASS INDEX (BMI) DOCUMENTED: ICD-10-PCS | Mod: CPTII,S$GLB,, | Performed by: PHYSICIAN ASSISTANT

## 2022-07-08 NOTE — PROGRESS NOTES
[facility]  [address]  [place] [address]  Phone: [phone]   [phone]    Patient's PCP: Jared Villa MD  Referring Provider: No ref. provider found    Subjective:      Chief Complaint:   Chief Complaint   Patient presents with    Right Knee - Pain     Pt is here with complaints of Right knee pain x 3 weeks. Was in [place] and did a lot of walking, has pain on medial side and in the calf area, has elevated it with some improvement, OTC med occasionally.        Past Medical History:   Diagnosis Date    Atrial fibrillation 2006    Cardioversion    Atrial fibrillation     Hypertension     Obese     Sleep apnea        Past Surgical History:   Procedure Laterality Date    CARDIOVERSION  2006    X2    OPEN REDUCTION AND INTERNAL FIXATION (ORIF) OF FRACTURE OF RIB Left 3/15/2019    Procedure: ORIF, FRACTURE, RIBS;  Surgeon: Eric Garrett MD;  Location: [facility];  Service: Cardiothoracic;  Laterality: Left;    TONSILLECTOMY         Current Outpatient Medications   Medication Sig    clonazePAM (KLONOPIN) 1 MG tablet Take 1 tablet (1 mg total) by mouth 2 (two) times daily as needed for Anxiety.     No current facility-administered medications for this visit.       Review of patient's allergies indicates:  No Known Allergies    Family History   Problem Relation Age of Onset    Hypertension Mother     Hypertension Father     Heart disease Father        Social History     Socioeconomic History    Marital status:    Tobacco Use    Smoking status: Never Smoker    Smokeless tobacco: Never Used   Substance and Sexual Activity    Alcohol use: Yes     Alcohol/week: 4.2 standard drinks     Types: 5 drink(s) per week     Comment: Socially    Drug use: No       History of present illness:  Mr. Pang presents to the clinic today as a new patient chief complaint of right posterior knee pain and calf pain that has been going on for about 3 weeks.  He reports the posterior  knee/distal hamstring pain began while on vacation and doing a lot a walking up hill/down hill in [place].  He denies any specific injury or trauma.  He denies any prior issues with his knee before this.  Reports the pain initially started in the posterior aspect of the distal hamstring and over time it progressively has begun to bother his calf on the right.    Review of Systems:    Constitutional: Negative for chills, fever and weight loss.   HENT: Negative for congestion.    Eyes: Negative for discharge and redness.   Respiratory: Negative for cough and shortness of breath.    Cardiovascular: Negative for chest pain.   Gastrointestinal: Negative for nausea and vomiting.   Musculoskeletal: See HPI.   Skin: Negative for rash.   Neurological: Negative for headaches.   Endo/Heme/Allergies: Does not bruise/bleed easily.   Psychiatric/Behavioral: The patient is not nervous/anxious.    All other systems reviewed and are negative.       Objective:      Physical Examination:    Vital Signs:  There were no vitals filed for this visit.    Body mass index is 49.37 kg/m².    This a well-developed, well nourished patient in no acute distress.  They are alert and oriented and cooperative to examination.     Right knee exam:  Skin was right knee is clean dry and intact.  There is no erythema or ecchymosis.  There are no signs or symptoms of infection.  Patient is able weight bear as tolerated right lower extremity but has an antalgic gait.  His right calf is soft but very tender to palpation.  He has a negative straight leg raise on the right.  Right knee active range of motion is approximately 3-100°.  Right knee is stable to varus and valgus stresses.  He is tender to palpation about the distal hamstring muscles/tendons.  He has no tenderness to palpation about the knee medially or laterally.  Majority of his discomfort is reproducible to palpation about the distal right hamstring and the right calf.  He has no reproduction of  pain in the right calf with dorsiflexion of the right ankle while knee is held in 90° of flexion or full extension.  It is only reproducible to palpation.  He has a negative Cobb sign on the right for an Achilles tendon rupture.    Pertinent New Results:        XRAY Report / Interpretation:   Three views were taken of the right knee today:  AP, lateral, and sunrise views.  They reveal no acute fractures or dislocations.  Patient has moderately severe arthrosis in the right knee in the patellofemoral and medial compartments with multiple osteophytes present.      Assessment:       1. Pain and swelling of right lower extremity    2. Arthritis of knee, right      Plan:     Pain and swelling of right lower extremity  -     X-Ray Knee 3 View Right  -     US Lower Extremity Veins Right; Future; Expected date: 07/08/2022    Arthritis of knee, right  -     Ambulatory referral/consult to Physical/Occupational Therapy; Future; Expected date: 07/15/2022        Follow up in about 6 weeks (around 8/19/2022) for 6 week f/up Right knee & PT Status.    My initial concern is that he may have a DVT in his right lower extremity after his prolonged plane trip on return back to the [place] from [place].  Therefore, we will start with a stat ultrasound of his right calf/popliteal space to rule out a deep vein thrombosis.  Secondarily he does have osteoarthritis in the right knee but it does not seem to be the primary cause of his right lower extremity discomfort.  I believe he simply has a right hamstring strain that is slow to improve and his subsequent antalgic gait has cause some strain of his right calf.  However, this right calf discomfort could be from the DVT and hopefully the ultrasound will help delineate that.  Therefore, if his ultrasound is negative, we will start him in formal physical therapy for treatment modalities to work on his hamstring and his calf.  Of course, if his ultrasound is positive for DVT lab to be  started on anticoagulant medication and that will become the priority treatment focus.  If it is negative, will have follow-up in 6 weeks after he has performed physical therapy for his right hamstring and calf to see how he is progressing with those treatments.  I did advise him he had advanced arthrosis in his right knee (as well as his left knee) and they may warrant further evaluation and treatment in the future if they become symptomatic.  I did discuss with him briefly corticosteroid injections in total knee arthroplasty however, the majority of this focus was spent towards the hamstring muscle/tendon and the calf and potential DVT.        Dane Meneses, [facility], PA-C    This note was created using [employer] voice recognition software that occasionally misinterprets words or phrases.

## 2022-12-01 ENCOUNTER — OFFICE VISIT (OUTPATIENT)
Dept: DERMATOLOGY | Facility: CLINIC | Age: 57
End: 2022-12-01
Payer: COMMERCIAL

## 2022-12-01 VITALS — WEIGHT: 315 LBS | HEIGHT: 75 IN | BODY MASS INDEX: 39.17 KG/M2

## 2022-12-01 DIAGNOSIS — L82.1 SEBORRHEIC KERATOSES: ICD-10-CM

## 2022-12-01 DIAGNOSIS — L28.0 FRICTIONAL LICHENOID DERMATOSIS: ICD-10-CM

## 2022-12-01 DIAGNOSIS — L81.4 SOLAR LENTIGO: ICD-10-CM

## 2022-12-01 DIAGNOSIS — L57.0 ACTINIC KERATOSES: Primary | ICD-10-CM

## 2022-12-01 PROCEDURE — 99203 OFFICE O/P NEW LOW 30 MIN: CPT | Mod: 25,S$GLB,, | Performed by: DERMATOLOGY

## 2022-12-01 PROCEDURE — 17003 DESTRUCTION, PREMALIGNANT LESIONS; SECOND THROUGH 14 LESIONS: ICD-10-PCS | Mod: S$GLB,,, | Performed by: DERMATOLOGY

## 2022-12-01 PROCEDURE — 1159F PR MEDICATION LIST DOCUMENTED IN MEDICAL RECORD: ICD-10-PCS | Mod: CPTII,S$GLB,, | Performed by: DERMATOLOGY

## 2022-12-01 PROCEDURE — 3008F PR BODY MASS INDEX (BMI) DOCUMENTED: ICD-10-PCS | Mod: CPTII,S$GLB,, | Performed by: DERMATOLOGY

## 2022-12-01 PROCEDURE — 17000 PR DESTRUCTION(LASER SURGERY,CRYOSURGERY,CHEMOSURGERY),PREMALIGNANT LESIONS,FIRST LESION: ICD-10-PCS | Mod: S$GLB,,, | Performed by: DERMATOLOGY

## 2022-12-01 PROCEDURE — 1159F MED LIST DOCD IN RCRD: CPT | Mod: CPTII,S$GLB,, | Performed by: DERMATOLOGY

## 2022-12-01 PROCEDURE — 1160F RVW MEDS BY RX/DR IN RCRD: CPT | Mod: CPTII,S$GLB,, | Performed by: DERMATOLOGY

## 2022-12-01 PROCEDURE — 17003 DESTRUCT PREMALG LES 2-14: CPT | Mod: S$GLB,,, | Performed by: DERMATOLOGY

## 2022-12-01 PROCEDURE — 3008F BODY MASS INDEX DOCD: CPT | Mod: CPTII,S$GLB,, | Performed by: DERMATOLOGY

## 2022-12-01 PROCEDURE — 99999 PR PBB SHADOW E&M-EST. PATIENT-LVL III: ICD-10-PCS | Mod: PBBFAC,,, | Performed by: DERMATOLOGY

## 2022-12-01 PROCEDURE — 99203 PR OFFICE/OUTPT VISIT, NEW, LEVL III, 30-44 MIN: ICD-10-PCS | Mod: 25,S$GLB,, | Performed by: DERMATOLOGY

## 2022-12-01 PROCEDURE — 1160F PR REVIEW ALL MEDS BY PRESCRIBER/CLIN PHARMACIST DOCUMENTED: ICD-10-PCS | Mod: CPTII,S$GLB,, | Performed by: DERMATOLOGY

## 2022-12-01 PROCEDURE — 99999 PR PBB SHADOW E&M-EST. PATIENT-LVL III: CPT | Mod: PBBFAC,,, | Performed by: DERMATOLOGY

## 2022-12-01 PROCEDURE — 17000 DESTRUCT PREMALG LESION: CPT | Mod: S$GLB,,, | Performed by: DERMATOLOGY

## 2022-12-01 RX ORDER — UREA 40 %
CREAM (GRAM) TOPICAL
Qty: 30 G | Refills: 5 | Status: SHIPPED | OUTPATIENT
Start: 2022-12-01 | End: 2024-04-03

## 2022-12-01 NOTE — PATIENT INSTRUCTIONS

## 2022-12-01 NOTE — PROGRESS NOTES
Subjective:       Patient ID:  Bird Feng is a 57 y.o. male who presents for   Chief Complaint   Patient presents with    Spot     On top of head     New patient    Here today for c/o dry spots on scalp x 6 months-he stated that they are like scabs that never go away  C/o dry spot on right elbow    Has no hx of NMSC  Has no fhx of MM    Current Outpatient Medications:   ·  clonazePAM (KLONOPIN) 1 MG tablet, Take 1 tablet (1 mg total) by mouth 2 (two) times daily as needed for Anxiety., Disp: 30 tablet, Rfl: 2      Review of Systems   Constitutional:  Negative for fever, chills and fatigue.   Skin:  Positive for dry skin, activity-related sunscreen use and wears hat. Negative for itching and rash.   Hematologic/Lymphatic: Does not bruise/bleed easily.      Objective:    Physical Exam   Constitutional: He appears well-developed and well-nourished. No distress.   Neurological: He is alert and oriented to person, place, and time. He is not disoriented.   Psychiatric: He has a normal mood and affect.   Skin:   Areas Examined (abnormalities noted in diagram):   Scalp / Hair Palpated and Inspected  Head / Face Inspection Performed  Neck Inspection Performed  Chest / Axilla Inspection Performed  Abdomen Inspection Performed  Back Inspection Performed  RUE Inspected  LUE Inspection Performed  Nails and Digits Inspection Performed                 Diagram Legend     Erythematous scaling macule/papule c/w actinic keratosis       Vascular papule c/w angioma      Pigmented verrucoid papule/plaque c/w seborrheic keratosis      Yellow umbilicated papule c/w sebaceous hyperplasia      Irregularly shaped tan macule c/w lentigo     1-2 mm smooth white papules consistent with Milia      Movable subcutaneous cyst with punctum c/w epidermal inclusion cyst      Subcutaneous movable cyst c/w pilar cyst      Firm pink to brown papule c/w dermatofibroma      Pedunculated fleshy papule(s) c/w skin tag(s)      Evenly pigmented macule c/w  junctional nevus     Mildly variegated pigmented, slightly irregular-bordered macule c/w mildly atypical nevus      Flesh colored to evenly pigmented papule c/w intradermal nevus       Pink pearly papule/plaque c/w basal cell carcinoma      Erythematous hyperkeratotic cursted plaque c/w SCC      Surgical scar with no sign of skin cancer recurrence      Open and closed comedones      Inflammatory papules and pustules      Verrucoid papule consistent consistent with wart     Erythematous eczematous patches and plaques     Dystrophic onycholytic nail with subungual debris c/w onychomycosis     Umbilicated papule    Erythematous-base heme-crusted tan verrucoid plaque consistent with inflamed seborrheic keratosis     Erythematous Silvery Scaling Plaque c/w Psoriasis     See annotation      Assessment / Plan:        Actinic keratoses  Cryosurgery Procedure Note    Verbal consent from the patient is obtained and the patient is aware of the precancerous quality and need for treatment of these lesions. Liquid nitrogen cryosurgery is applied to the 12 actinic keratoses, as detailed in the physical exam, to produce a freeze injury. The patient is aware that blisters may form and is instructed on wound care with gentle cleansing and use of vaseline ointment to keep moist until healed. The patient is supplied a handout on cryosurgery and is instructed to call if lesions do not completely resolve.    Discussed field treatment with fluorouracil cream, and anticipated robust reaction. Not interested for now.    Improve sun protection    Patient instructed in importance in daily broad spectrum sun protection of at least spf 30. Mineral sunscreen ingredients preferred (Zinc +/- Titanium) and can be found OTC.   Recommend Elta MD for daily use on face and neck.  Patient encouraged to wear hat for all outdoor exposure.   Also discussed sun avoidance and use of protective clothing.      Frictional dermatosis, elbows  -     urea (CARMOL)  40 % Crea; Apply thin film to elbows daily to BID  Dispense: 30 g; Refill: 5  Or cerave for psoriasis  May find urea on amazon.com     Seborrheic keratoses  These are benign inherited growths without a malignant potential. Reassurance given to patient. No treatment is necessary.     Solar lentigo  This is a benign hyperpigmented sun induced lesion. Daily sun protection will reduce the number of new lesions. Treatment of these benign lesions are considered cosmetic.    Patient instructed in importance in daily broad spectrum sun protection of at least spf 30. Mineral sunscreen ingredients preferred (Zinc +/- Titanium) and can be found OTC.   Recommend Elta MD for daily use on face and neck.  Patient encouraged to wear hat for all outdoor exposure.   Also discussed sun avoidance and use of protective clothing.             Follow up in about 6 months (around 6/1/2023).

## 2023-08-28 ENCOUNTER — OFFICE VISIT (OUTPATIENT)
Dept: FAMILY MEDICINE | Facility: CLINIC | Age: 58
End: 2023-08-28
Payer: COMMERCIAL

## 2023-08-28 VITALS
BODY MASS INDEX: 39.17 KG/M2 | HEIGHT: 75 IN | TEMPERATURE: 97 F | HEART RATE: 75 BPM | SYSTOLIC BLOOD PRESSURE: 134 MMHG | DIASTOLIC BLOOD PRESSURE: 80 MMHG | WEIGHT: 315 LBS | OXYGEN SATURATION: 97 %

## 2023-08-28 DIAGNOSIS — K04.7 DENTAL ABSCESS: ICD-10-CM

## 2023-08-28 DIAGNOSIS — J02.9 SORE THROAT: Primary | ICD-10-CM

## 2023-08-28 PROCEDURE — 1160F PR REVIEW ALL MEDS BY PRESCRIBER/CLIN PHARMACIST DOCUMENTED: ICD-10-PCS | Mod: CPTII,S$GLB,,

## 2023-08-28 PROCEDURE — 3008F PR BODY MASS INDEX (BMI) DOCUMENTED: ICD-10-PCS | Mod: CPTII,S$GLB,,

## 2023-08-28 PROCEDURE — 3008F BODY MASS INDEX DOCD: CPT | Mod: CPTII,S$GLB,,

## 2023-08-28 PROCEDURE — 3079F PR MOST RECENT DIASTOLIC BLOOD PRESSURE 80-89 MM HG: ICD-10-PCS | Mod: CPTII,S$GLB,,

## 2023-08-28 PROCEDURE — 1159F MED LIST DOCD IN RCRD: CPT | Mod: CPTII,S$GLB,,

## 2023-08-28 PROCEDURE — 99214 PR OFFICE/OUTPT VISIT, EST, LEVL IV, 30-39 MIN: ICD-10-PCS | Mod: S$GLB,,,

## 2023-08-28 PROCEDURE — 3075F SYST BP GE 130 - 139MM HG: CPT | Mod: CPTII,S$GLB,,

## 2023-08-28 PROCEDURE — 3075F PR MOST RECENT SYSTOLIC BLOOD PRESS GE 130-139MM HG: ICD-10-PCS | Mod: CPTII,S$GLB,,

## 2023-08-28 PROCEDURE — 1160F RVW MEDS BY RX/DR IN RCRD: CPT | Mod: CPTII,S$GLB,,

## 2023-08-28 PROCEDURE — 99214 OFFICE O/P EST MOD 30 MIN: CPT | Mod: S$GLB,,,

## 2023-08-28 PROCEDURE — 1159F PR MEDICATION LIST DOCUMENTED IN MEDICAL RECORD: ICD-10-PCS | Mod: CPTII,S$GLB,,

## 2023-08-28 PROCEDURE — 3079F DIAST BP 80-89 MM HG: CPT | Mod: CPTII,S$GLB,,

## 2023-08-28 RX ORDER — AMOXICILLIN AND CLAVULANATE POTASSIUM 875; 125 MG/1; MG/1
1 TABLET, FILM COATED ORAL EVERY 12 HOURS
Qty: 20 TABLET | Refills: 0 | Status: SHIPPED | OUTPATIENT
Start: 2023-08-28 | End: 2023-09-07

## 2023-08-28 RX ORDER — ALPRAZOLAM 0.5 MG/1
0.5 TABLET ORAL NIGHTLY
COMMUNITY
Start: 2023-08-05

## 2023-08-28 NOTE — PROGRESS NOTES
Subjective:       Patient ID: Bird Feng is a 58 y.o. male.    Chief Complaint: Facial Swelling    Bird Feng is a 58 y.o. male patient who presents to clinic with complaints of swollen throat and face. When he woke up this morning he was having trouble swallowing and his wife noticed the right side of his face was swollen. He did have a tooth pulled on right side about 3 weeks ago. He took an antibiotic for 10 days after extraction. No body aches or chills. His wife felt his head and states he felt warm today. His son was diagnosed with strep on Friday. He also complains of fullness and popping in his right ear.     Review of patient's allergies indicates:  No Known Allergies  Social Determinants of Health     Tobacco Use: Low Risk  (8/28/2023)    Patient History     Smoking Tobacco Use: Never     Smokeless Tobacco Use: Never     Passive Exposure: Not on file   Alcohol Use: Not on file   Financial Resource Strain: Not on file   Food Insecurity: Not on file   Transportation Needs: Not on file   Physical Activity: Not on file   Stress: Not on file   Social Connections: Not on file   Housing Stability: Not on file   Depression: Low Risk  (8/28/2023)    Depression     Last PHQ-4: Flowsheet Data: 0      Past Medical History:   Diagnosis Date    Atrial fibrillation 2006    Cardioversion    Atrial fibrillation     Hypertension     Obese     Sleep apnea       Past Surgical History:   Procedure Laterality Date    CARDIOVERSION  2006    X2    OPEN REDUCTION AND INTERNAL FIXATION (ORIF) OF FRACTURE OF RIB Left 3/15/2019    Procedure: ORIF, FRACTURE, RIBS;  Surgeon: Eric Garrett MD;  Location: [facility];  Service: Cardiothoracic;  Laterality: Left;    TONSILLECTOMY        Social History     Socioeconomic History    Marital status:          Current Outpatient Medications:     ALPRAZolam (XANAX) 0.5 MG tablet, Take 0.5 mg by mouth every evening., Disp: , Rfl:     amoxicillin-clavulanate 875-125mg (AUGMENTIN) 875-125  mg per tablet, Take 1 tablet by mouth every 12 (twelve) hours. for 10 days, Disp: 20 tablet, Rfl: 0    clonazePAM (KLONOPIN) 1 MG tablet, Take 1 tablet (1 mg total) by mouth 2 (two) times daily as needed for Anxiety., Disp: 30 tablet, Rfl: 2    urea (CARMOL) 40 % Crea, Apply thin film to elbows daily to BID (Patient not taking: Reported on 8/28/2023), Disp: 30 g, Rfl: 5    Lab Results   Component Value Date    WBC 3.81 (L) 11/24/2020    HGB 14.5 11/24/2020    HCT 44.5 11/24/2020     11/24/2020    CHOL 245 (H) 08/02/2018    TRIG 139 08/02/2018    HDL 50 08/02/2018    ALT 32 11/24/2020    AST 17 11/24/2020     11/24/2020    K 4.4 11/24/2020     11/24/2020    CREATININE 1.0 11/24/2020    BUN 11 11/24/2020    CO2 24 11/24/2020    TSH 1.60 08/02/2018    INR 1.1 11/24/2020    HGBA1C 6.3 (H) 03/09/2019       Review of Systems   Constitutional:  Negative for chills and fever.   HENT:  Positive for ear pain, facial swelling and sinus pressure/congestion. Negative for nasal congestion, sore throat and trouble swallowing.    Respiratory:  Negative for cough, chest tightness and shortness of breath.    Cardiovascular:  Negative for chest pain and palpitations.       Objective:      Physical Exam  Vitals reviewed.   Constitutional:       Appearance: Normal appearance.   HENT:      Head: Normocephalic and atraumatic.      Right Ear: Tympanic membrane normal. There is impacted cerumen.      Left Ear: Tympanic membrane normal.      Ears:      Comments: Right ear cerumen impaction removed with lavage.  Tolerated well.      Nose: No congestion.      Mouth/Throat:      Mouth: Mucous membranes are moist.      Dentition: Dental abscesses present.      Pharynx: Oropharynx is clear. Posterior oropharyngeal erythema present.      Comments: Swelling noted to posterior upper right arch  Cardiovascular:      Rate and Rhythm: Normal rate and regular rhythm.      Pulses: Normal pulses.      Heart sounds: Normal heart sounds,  S1 normal and S2 normal.   Pulmonary:      Effort: Pulmonary effort is normal.      Breath sounds: Normal breath sounds.   Neurological:      Mental Status: He is alert.         Assessment:       1. Sore throat    2. Dental abscess        Plan:       Bird was seen today for facial swelling.    Diagnoses and all orders for this visit:    Sore throat  -     POCT Strep A, Molecular    Dental abscess  -     amoxicillin-clavulanate 875-125mg (AUGMENTIN) 875-125 mg per tablet; Take 1 tablet by mouth every 12 (twelve) hours. for 10 days     Strep swab negative. Swelling noted to posterior upper arch on the right side appear to be an abscess. Take augmentin as prescribed and follow up with Dentist. Right ear cerumen impaction removed with lavage.       Follow up with dentist.

## 2024-04-03 ENCOUNTER — OFFICE VISIT (OUTPATIENT)
Dept: FAMILY MEDICINE | Facility: CLINIC | Age: 59
End: 2024-04-03
Payer: COMMERCIAL

## 2024-04-03 ENCOUNTER — LAB VISIT (OUTPATIENT)
Dept: LAB | Facility: HOSPITAL | Age: 59
End: 2024-04-03
Attending: STUDENT IN AN ORGANIZED HEALTH CARE EDUCATION/TRAINING PROGRAM
Payer: COMMERCIAL

## 2024-04-03 VITALS
WEIGHT: 315 LBS | TEMPERATURE: 98 F | HEIGHT: 75 IN | OXYGEN SATURATION: 96 % | DIASTOLIC BLOOD PRESSURE: 86 MMHG | SYSTOLIC BLOOD PRESSURE: 116 MMHG | HEART RATE: 88 BPM | BODY MASS INDEX: 39.17 KG/M2

## 2024-04-03 DIAGNOSIS — E66.01 CLASS 3 SEVERE OBESITY DUE TO EXCESS CALORIES WITHOUT SERIOUS COMORBIDITY WITH BODY MASS INDEX (BMI) OF 50.0 TO 59.9 IN ADULT: ICD-10-CM

## 2024-04-03 DIAGNOSIS — E78.00 PURE HYPERCHOLESTEROLEMIA: ICD-10-CM

## 2024-04-03 DIAGNOSIS — G47.33 OSA ON CPAP: ICD-10-CM

## 2024-04-03 DIAGNOSIS — Z11.4 ENCOUNTER FOR SCREENING FOR HIV: ICD-10-CM

## 2024-04-03 DIAGNOSIS — R73.03 PREDIABETES: ICD-10-CM

## 2024-04-03 DIAGNOSIS — Z11.59 ENCOUNTER FOR HEPATITIS C SCREENING TEST FOR LOW RISK PATIENT: ICD-10-CM

## 2024-04-03 DIAGNOSIS — Z86.79 HISTORY OF ATRIAL FIBRILLATION: ICD-10-CM

## 2024-04-03 DIAGNOSIS — Z76.89 ENCOUNTER TO ESTABLISH CARE: Primary | ICD-10-CM

## 2024-04-03 DIAGNOSIS — Z76.89 ENCOUNTER TO ESTABLISH CARE: ICD-10-CM

## 2024-04-03 PROBLEM — E66.813 CLASS 3 SEVERE OBESITY DUE TO EXCESS CALORIES WITHOUT SERIOUS COMORBIDITY WITH BODY MASS INDEX (BMI) OF 50.0 TO 59.9 IN ADULT: Status: ACTIVE | Noted: 2018-07-23

## 2024-04-03 LAB
ALBUMIN SERPL BCP-MCNC: 3.8 G/DL (ref 3.5–5.2)
ALP SERPL-CCNC: 67 U/L (ref 55–135)
ALT SERPL W/O P-5'-P-CCNC: 28 U/L (ref 10–44)
ANION GAP SERPL CALC-SCNC: 9 MMOL/L (ref 8–16)
AST SERPL-CCNC: 14 U/L (ref 10–40)
BASOPHILS # BLD AUTO: 0.05 K/UL (ref 0–0.2)
BASOPHILS NFR BLD: 0.8 % (ref 0–1.9)
BILIRUB SERPL-MCNC: 0.5 MG/DL (ref 0.1–1)
BUN SERPL-MCNC: 11 MG/DL (ref 6–20)
CALCIUM SERPL-MCNC: 9.3 MG/DL (ref 8.7–10.5)
CHLORIDE SERPL-SCNC: 108 MMOL/L (ref 95–110)
CHOLEST SERPL-MCNC: 233 MG/DL (ref 120–199)
CHOLEST/HDLC SERPL: 5 {RATIO} (ref 2–5)
CO2 SERPL-SCNC: 23 MMOL/L (ref 23–29)
CREAT SERPL-MCNC: 1.1 MG/DL (ref 0.5–1.4)
DIFFERENTIAL METHOD BLD: NORMAL
EOSINOPHIL # BLD AUTO: 0.1 K/UL (ref 0–0.5)
EOSINOPHIL NFR BLD: 0.8 % (ref 0–8)
ERYTHROCYTE [DISTWIDTH] IN BLOOD BY AUTOMATED COUNT: 13.5 % (ref 11.5–14.5)
EST. GFR  (NO RACE VARIABLE): >60 ML/MIN/1.73 M^2
ESTIMATED AVG GLUCOSE: 177 MG/DL (ref 68–131)
GLUCOSE SERPL-MCNC: 152 MG/DL (ref 70–110)
HBA1C MFR BLD: 7.8 % (ref 4–5.6)
HCT VFR BLD AUTO: 45.7 % (ref 40–54)
HCV AB SERPL QL IA: NORMAL
HDLC SERPL-MCNC: 47 MG/DL (ref 40–75)
HDLC SERPL: 20.2 % (ref 20–50)
HGB BLD-MCNC: 15.1 G/DL (ref 14–18)
HIV 1+2 AB+HIV1 P24 AG SERPL QL IA: NORMAL
IMM GRANULOCYTES # BLD AUTO: 0.03 K/UL (ref 0–0.04)
IMM GRANULOCYTES NFR BLD AUTO: 0.5 % (ref 0–0.5)
LDLC SERPL CALC-MCNC: 161.6 MG/DL (ref 63–159)
LYMPHOCYTES # BLD AUTO: 1.6 K/UL (ref 1–4.8)
LYMPHOCYTES NFR BLD: 25.4 % (ref 18–48)
MCH RBC QN AUTO: 30.5 PG (ref 27–31)
MCHC RBC AUTO-ENTMCNC: 33 G/DL (ref 32–36)
MCV RBC AUTO: 92 FL (ref 82–98)
MONOCYTES # BLD AUTO: 0.8 K/UL (ref 0.3–1)
MONOCYTES NFR BLD: 12.6 % (ref 4–15)
NEUTROPHILS # BLD AUTO: 3.8 K/UL (ref 1.8–7.7)
NEUTROPHILS NFR BLD: 59.9 % (ref 38–73)
NONHDLC SERPL-MCNC: 186 MG/DL
NRBC BLD-RTO: 0 /100 WBC
PLATELET # BLD AUTO: 239 K/UL (ref 150–450)
PMV BLD AUTO: 10.4 FL (ref 9.2–12.9)
POTASSIUM SERPL-SCNC: 4.5 MMOL/L (ref 3.5–5.1)
PROT SERPL-MCNC: 7.1 G/DL (ref 6–8.4)
RBC # BLD AUTO: 4.95 M/UL (ref 4.6–6.2)
SODIUM SERPL-SCNC: 140 MMOL/L (ref 136–145)
TRIGL SERPL-MCNC: 122 MG/DL (ref 30–150)
TSH SERPL DL<=0.005 MIU/L-ACNC: 2.1 UIU/ML (ref 0.4–4)
WBC # BLD AUTO: 6.41 K/UL (ref 3.9–12.7)

## 2024-04-03 PROCEDURE — 80061 LIPID PANEL: CPT | Performed by: STUDENT IN AN ORGANIZED HEALTH CARE EDUCATION/TRAINING PROGRAM

## 2024-04-03 PROCEDURE — 87389 HIV-1 AG W/HIV-1&-2 AB AG IA: CPT | Performed by: STUDENT IN AN ORGANIZED HEALTH CARE EDUCATION/TRAINING PROGRAM

## 2024-04-03 PROCEDURE — 80053 COMPREHEN METABOLIC PANEL: CPT | Performed by: STUDENT IN AN ORGANIZED HEALTH CARE EDUCATION/TRAINING PROGRAM

## 2024-04-03 PROCEDURE — 99999 PR PBB SHADOW E&M-EST. PATIENT-LVL III: CPT | Mod: PBBFAC,,, | Performed by: STUDENT IN AN ORGANIZED HEALTH CARE EDUCATION/TRAINING PROGRAM

## 2024-04-03 PROCEDURE — 86803 HEPATITIS C AB TEST: CPT | Performed by: STUDENT IN AN ORGANIZED HEALTH CARE EDUCATION/TRAINING PROGRAM

## 2024-04-03 PROCEDURE — 84443 ASSAY THYROID STIM HORMONE: CPT | Performed by: STUDENT IN AN ORGANIZED HEALTH CARE EDUCATION/TRAINING PROGRAM

## 2024-04-03 PROCEDURE — 99204 OFFICE O/P NEW MOD 45 MIN: CPT | Mod: S$GLB,,, | Performed by: STUDENT IN AN ORGANIZED HEALTH CARE EDUCATION/TRAINING PROGRAM

## 2024-04-03 PROCEDURE — 83036 HEMOGLOBIN GLYCOSYLATED A1C: CPT | Performed by: STUDENT IN AN ORGANIZED HEALTH CARE EDUCATION/TRAINING PROGRAM

## 2024-04-03 PROCEDURE — 36415 COLL VENOUS BLD VENIPUNCTURE: CPT | Mod: PO | Performed by: STUDENT IN AN ORGANIZED HEALTH CARE EDUCATION/TRAINING PROGRAM

## 2024-04-03 PROCEDURE — 85025 COMPLETE CBC W/AUTO DIFF WBC: CPT | Performed by: STUDENT IN AN ORGANIZED HEALTH CARE EDUCATION/TRAINING PROGRAM

## 2024-04-03 PROCEDURE — 99213 OFFICE O/P EST LOW 20 MIN: CPT | Mod: PBBFAC,PO | Performed by: STUDENT IN AN ORGANIZED HEALTH CARE EDUCATION/TRAINING PROGRAM

## 2024-04-03 NOTE — PATIENT INSTRUCTIONS
[facility],     If you are due for any health screening(s) below please notify me so we can arrange them to be ordered and scheduled. Most healthy patients at your age complete them, but you are free to accept or refuse.     If you can't do it, I'll definitely understand. If you can, I'd certainly appreciate it!    Tests to Keep You Healthy    Colon Cancer Screening: DUE      Its time for your colon cancer screening     Colorectal cancer is one of the leading causes of cancer death for men and women but it doesnt have to be. Screenings can prevent colorectal cancer or find it early enough to treat and cure the disease.     Our records indicate that you may be overdue for colon cancer screening. A colonoscopy or stool screening test can help identify patients at risk for developing colon cancer. Cancer screenings save lives, so schedule yours today to stay healthy.     A colonoscopy is the preferred test for detecting colon cancer. It is needed only once every 10 years if results are negative. While you are sedated, a flexible, lighted tube with a tiny camera is inserted into the rectum and advanced through the colon to look for cancers.     An alternative screening test that is used at home and returned to the lab may also be used. It detects hidden blood in bowel movements which could indicate cancer in the colon. If results are positive, you will need a colonoscopy to determine if the blood is a sign of cancer. This type of follow up (diagnostic) colonoscopy usually requires additional copays as required by your insurance provider.     If you recently had your colon cancer screening performed outside of [facility], please let your Health care team know so that they can update your health record. Please contact your PCP if you have any questions.

## 2024-04-03 NOTE — PROGRESS NOTES
[facility] Note    Subjective:  Chief Complaint:   Chief Complaint   Patient presents with    Establish Care       History of Present Illness:  Bird is here for establishing care   Feels well today. No daily medications.     PreDM  Previous A1c 6.3 (3/9/2019)  Recent Home check A1c 7.0     H/o Afib s/p cardioversion  Not currently on blood thinner     MANULEA on CPAP  Alprazolam 0.5 last fill 8/5/2023 #30  Managed by Sleep Medicine Jayme Martinez MD     CRC Screening: discussed risks vs benefits of cologuard vs colonoscopy - 2 years ago, due in 6 months     Recommend COVID, Shingles, RSV, flu vaccinations. Had Shingles vaccine     Screening  Health Maintenance         Date Due Completion Date    Hepatitis C Screening Never done ---    COVID-19 Vaccine (1) Never done ---    HIV Screening Never done ---    Colorectal Cancer Screening Never done ---    Shingles Vaccine (1 of 2) Never done ---    Hemoglobin A1c (Prediabetes) 03/09/2020 3/9/2019    Lipid Panel 08/02/2023 8/2/2018    Influenza Vaccine (1) Never done ---    RSV Vaccine (Age 60+ and Pregnant patients) (1 - 1-dose 60+ series) Never done ---    TETANUS VACCINE 02/11/2027 2/11/2017          Immunization History   Administered Date(s) Administered    Td (Adult), Unspecified Formulation 02/11/2017     The ASCVD Risk score (Matteo DK, et al., 2019) failed to calculate for the following reasons:    Cannot find a previous HDL lab    Cannot find a previous total cholesterol lab    Allergies:  Review of patient's allergies indicates:  No Known Allergies    Home Medications:  Current Outpatient Medications on File Prior to Visit   Medication Sig    ALPRAZolam (XANAX) 0.5 MG tablet Take 0.5 mg by mouth every evening.    [DISCONTINUED] clonazePAM (KLONOPIN) 1 MG tablet Take 1 tablet (1 mg total) by mouth 2 (two) times daily as needed for Anxiety.    [DISCONTINUED] urea (CARMOL) 40 % Crea Apply thin film to elbows daily to BID (Patient not taking:  Reported on 2023)     No current facility-administered medications on file prior to visit.       Past Medical History:   Diagnosis Date    Atrial fibrillation 2006    Cardioversion    Atrial fibrillation     Hypertension     Obese     Sleep apnea      Past Surgical History:   Procedure Laterality Date    CARDIOVERSION  2006    X2    OPEN REDUCTION AND INTERNAL FIXATION (ORIF) OF FRACTURE OF RIB Left 3/15/2019    Procedure: ORIF, FRACTURE, RIBS;  Surgeon: Eric Garrett MD;  Location: [facility];  Service: Cardiothoracic;  Laterality: Left;    TONSILLECTOMY       Family History   Problem Relation Age of Onset    Hypertension Mother     Hypertension Father     Heart disease Father     Abnormal  screen Father      Social History     Tobacco Use    Smoking status: Never    Smokeless tobacco: Never   Substance Use Topics    Alcohol use: Yes     Alcohol/week: 4.2 standard drinks of alcohol     Types: 5 drink(s) per week     Comment: Socially    Drug use: No            The patient's past medical history, surgical history, social history, family history, allergies and medications have been reviewed.    Review of Systems     10 point review of systems was conducted and only the pertinent positives and pertinent negatives are noted above in the HPI section.    Physical Examination  General appearance: alert, cooperative, no distress  HEENT: normocephalic, atraumatic, PERRLA   Neck: trachea midline,  no neck stiffness  Lungs: clear to auscultation, no wheezes, rales or rhonchi, symmetric air entry  Heart: normal rate, regular rhythm, normal S1, S2, no murmurs, rubs, clicks or gallops  Abdomen: soft, nontender, nondistended, no rigidity, rebound, or guarding.   Skin: No rashes or abnormal skin lesions, no apparent jaundice or bruising  Extremities: Full ROM of all extremities, peripheral pulses normal, no unilateral leg swelling or calf tenderness   Neurological:alert, oriented, normal speech, no new focal findings  "or movement disorder noted from baseline      BP Readings from Last 3 Encounters:   04/03/24 116/86   08/28/23 134/80   11/24/20 139/86     Wt Readings from Last 3 Encounters:   04/03/24 (!) 184.3 kg (406 lb 4.9 oz)   08/28/23 (!) 175.4 kg (386 lb 9.2 oz)   12/01/22 (!) 179.2 kg (395 lb)     /86 (BP Location: Left arm, Patient Position: Sitting, BP Method: Large (Manual))   Pulse 88   Temp 97.8 °F (36.6 °C) (Oral)   Ht 6' 3" (1.905 m)   Wt (!) 184.3 kg (406 lb 4.9 oz)   SpO2 96%   BMI 50.78 kg/m²       274}  Laboratory: I have reviewed old labs below:       274}    Lab Results   Component Value Date    WBC 3.81 (L) 11/24/2020    HGB 14.5 11/24/2020    HCT 44.5 11/24/2020    MCV 88 11/24/2020     11/24/2020     11/24/2020    K 4.4 11/24/2020     11/24/2020    CALCIUM 9.2 11/24/2020    PHOS 3.5 03/17/2019    CO2 24 11/24/2020     11/24/2020    BUN 11 11/24/2020    CREATININE 1.0 11/24/2020    ANIONGAP 10 11/24/2020    PROT 7.3 11/24/2020    ALBUMIN 4.2 11/24/2020    BILITOT 0.7 11/24/2020    ALKPHOS 71 11/24/2020    ALT 32 11/24/2020    AST 17 11/24/2020    INR 1.1 11/24/2020    CHOL 245 (H) 08/02/2018    TRIG 139 08/02/2018    HDL 50 08/02/2018    LDLCALC 167 (H) 08/02/2018    TSH 1.60 08/02/2018    HGBA1C 6.3 (H) 03/09/2019      Lab reviewed by me: Particular labs of significance that I will monitor, workup, or treat to improve are mentioned below in diagnostic impression remarks.    Imaging/EKG: I have reviewed the pertinent results and my findings are noted in remarks.     274}    CC:   Chief Complaint   Patient presents with    Establish Care           274}    Assessment/Plan  Bird Feng is a 59 y.o. male who presents to clinic with:  1. Encounter to establish care    2. Class 3 severe obesity due to excess calories without serious comorbidity with body mass index (BMI) of 50.0 to 59.9 in adult    3. History of atrial fibrillation    4. MANUELA on CPAP    5. Prediabetes    6. " Pure hypercholesterolemia    7. Encounter for hepatitis C screening test for low risk patient    8. Encounter for screening for HIV          274}  Diagnostic Impression Remarks       59 y.o. male who presents to clinic today for est care     This is the extent of this pleasant patient's concerns at this present time.  I also discussed the importance of close follow up to discuss labs, change or modify his medications if needed, monitor side effects, and further evaluation of medical problems.     Additional workup planned: see labs ordered below.    See below for labs and meds ordered with associated diagnosis      1. Encounter to establish care  - CBC Auto Differential; Future  - Comprehensive Metabolic Panel; Future  - Hemoglobin A1C; Future  - Lipid Panel; Future  - TSH; Future  - Microalbumin/Creatinine Ratio, Urine; Future  - HIV 1/2 Ag/Ab (4th Gen); Future  - Hepatitis C Antibody; Future    2. Class 3 severe obesity due to excess calories without serious comorbidity with body mass index (BMI) of 50.0 to 59.9 in adult  - CBC Auto Differential; Future  - Comprehensive Metabolic Panel; Future  - Hemoglobin A1C; Future  - Lipid Panel; Future  - TSH; Future    I recommend the following therapeutic lifestyle changes:     Transition to a low salt, primarily plant-based diet which emphasizes:  Increase fiber with vegetables, whole grains, legumes   Increase lean protein by eating beans, legumes, fish, poultry, eggs, bison  Good dairy choices for lean protein include greek yogurt (low sugar options) and cottage cheese  Replacing butter with healthy fats, such as olive oil and nuts  Using herbs and spices instead of salt to flavor foods   Limiting red meat to no more than a few times a month   Limit added sugars (sodas, fruit juices, fancy coffee drinks)  Limit processed foods        Initiation of a graded aerobic exercise plan (goal 30-45 minutes per day 4-5 times per week)  Patient encouraged to participate in low  intensity strength exercising and if unfamiliar with strength and conditioning training, I recommend joining a gym with access to a  to further instruct the patient.      If weight/obesity is a concern a reasonable weight loss goal of 1-2lbs per month to reach a goal of 5-10% weight loss in 5-6 months, or a BMI less than 25.    3. History of atrial fibrillation  RRR. Monitor     4. MANUELA on CPAP  Stable. Managed by sleep med    5. Prediabetes  - Comprehensive Metabolic Panel; Future  - Hemoglobin A1C; Future  - Microalbumin/Creatinine Ratio, Urine; Future    6. Pure hypercholesterolemia  - Lipid Panel; Future    7. Encounter for hepatitis C screening test for low risk patient  - Hepatitis C Antibody; Future    8. Encounter for screening for HIV  - HIV 1/2 Ag/Ab (4th Gen); Future      Pending labs   RTC annually or PRN     Ame Mar MD, [facility]   Family Medicine Physician  04/03/2024                 The following information is provided to all patients.  This information is to help you find resources for any of the problems found today that may be affecting your health:                Living healthy guide: www.[facility].[place].gov       Understanding Diabetes: www.diabetes.org       Eating healthy: www.cdc.gov/healthyweight      [facility] home safety checklist: www.cdc.gov/steadi/patient.html      Agency on Aging: www.goea.[place].gov       Alcoholics anonymous (AA): www.aa.org      Physical Activity: www.anu.nih.gov/qv0yhkk       Tobacco use: www.quitwithusla.org

## 2024-04-11 ENCOUNTER — TELEPHONE (OUTPATIENT)
Dept: FAMILY MEDICINE | Facility: CLINIC | Age: 59
End: 2024-04-11
Payer: COMMERCIAL
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
## 2024-04-16 ENCOUNTER — OFFICE VISIT (OUTPATIENT)
Dept: FAMILY MEDICINE | Facility: CLINIC | Age: 59
End: 2024-04-16
Payer: COMMERCIAL

## 2024-04-16 DIAGNOSIS — E11.9 DIABETES MELLITUS, NEW ONSET: Primary | ICD-10-CM

## 2024-04-16 DIAGNOSIS — E78.00 PURE HYPERCHOLESTEROLEMIA: ICD-10-CM

## 2024-04-16 DIAGNOSIS — E66.01 CLASS 3 SEVERE OBESITY DUE TO EXCESS CALORIES WITHOUT SERIOUS COMORBIDITY WITH BODY MASS INDEX (BMI) OF 50.0 TO 59.9 IN ADULT: ICD-10-CM

## 2024-04-16 PROCEDURE — 1160F RVW MEDS BY RX/DR IN RCRD: CPT | Mod: CPTII,95,, | Performed by: STUDENT IN AN ORGANIZED HEALTH CARE EDUCATION/TRAINING PROGRAM

## 2024-04-16 PROCEDURE — 3051F HG A1C>EQUAL 7.0%<8.0%: CPT | Mod: CPTII,95,, | Performed by: STUDENT IN AN ORGANIZED HEALTH CARE EDUCATION/TRAINING PROGRAM

## 2024-04-16 PROCEDURE — 99214 OFFICE O/P EST MOD 30 MIN: CPT | Mod: 95,,, | Performed by: STUDENT IN AN ORGANIZED HEALTH CARE EDUCATION/TRAINING PROGRAM

## 2024-04-16 PROCEDURE — 1159F MED LIST DOCD IN RCRD: CPT | Mod: CPTII,95,, | Performed by: STUDENT IN AN ORGANIZED HEALTH CARE EDUCATION/TRAINING PROGRAM

## 2024-04-16 RX ORDER — METFORMIN HYDROCHLORIDE 500 MG/1
TABLET, EXTENDED RELEASE ORAL
Qty: 187 TABLET | Refills: 0 | Status: SHIPPED | OUTPATIENT
Start: 2024-04-16 | End: 2024-07-22

## 2024-04-16 NOTE — PROGRESS NOTES
[facility]    Subjective:    Chief Complaint: No chief complaint on file.    Patient is here for telemedicine visit  The patient location is: [place]     Visit type: Virtual visit with synchronous audio and video  Each patient to whom he or she provides medical services by telemedicine is:  (1) informed of the relationship between the physician and patient and the respective role of any other health care provider with respect to management of the patient; and (2) notified that he or she may decline to receive medical services by telemedicine and may withdraw from such care at any time.    The following portions of the patient's history were reviewed and updated as appropriate: allergies, current medications, past family history, past medical history, past social history, past surgical history and problem list.    He  has a past medical history of Atrial fibrillation (), Atrial fibrillation, Hypertension, Obese, and Sleep apnea.  He  has a past surgical history that includes Cardioversion (); Tonsillectomy; and Open reduction and internal fixation (ORIF) of fracture of rib (Left, 3/15/2019).  He  reports that he has never smoked. He has never used smokeless tobacco. He reports current alcohol use of about 4.2 standard drinks of alcohol per week. He reports that he does not use drugs.  He family history includes Abnormal  screen in his father; Heart disease in his father; Hypertension in his father and mother.    Review of patient's allergies indicates:  No Known Allergies  Objective:      Physical Examination  Wt Readings from Last 3 Encounters:   24 (!) 184.3 kg (406 lb 4.9 oz)   23 (!) 175.4 kg (386 lb 9.2 oz)   22 (!) 179.2 kg (395 lb)     BP Readings from Last 3 Encounters:   24 116/86   23 134/80   20 139/86     Estimated body mass index is 50.78 kg/m² as calculated from the following:    Height as of 4/3/24: 6' 3" (1.905 m).    " Weight as of 4/3/24: 184.3 kg (406 lb 4.9 oz).     CONSTITUTIONAL: No apparent distress. Does not appear acutely ill or septic. Appears adequately hydrated.  PULM: Breathing unlabored.  PSYCHIATRIC: Alert and conversant and grossly oriented. Mood is grossly neutral. Affect appropriate. Judgment and insight grossly intact.  Integument: normal coloration and turgor, no rashes, no suspicious skin lesions noted.    Data reviewed  Previous medical records reviewed and summarized in HPI.     Assessment:       Bird Feng is a 59 y.o. male who presents with:    1. Diabetes mellitus, new onset    2. Pure hypercholesterolemia    3. Class 3 severe obesity due to excess calories without serious comorbidity with body mass index (BMI) of 50.0 to 59.9 in adult      Previously prediabetes A1c 6.3, now A1c 7.8 on last labs  No previous diabetic medications   Cr 1.1     The 10-year ASCVD risk score (Matteo LUGO, et al., 2019) is: 8.8%    Values used to calculate the score:      Age: 59 years      Sex: Male      Is Non- : No      Diabetic: No      Tobacco smoker: No      Systolic Blood Pressure: 116 mmHg      Is BP treated: No      HDL Cholesterol: 47 mg/dL      Total Cholesterol: 233 mg/dL      Plan:   1. Diabetes mellitus, new onset  - metFORMIN (GLUCOPHAGE-XR) 500 MG ER 24hr tablet; Take 1 tablet (500 mg total) by mouth daily with breakfast for 7 days, THEN 1 tablet (500 mg total) 2 (two) times daily with meals.  Dispense: 187 tablet; Refill: 0  - CBC Auto Differential; Future  - Comprehensive Metabolic Panel; Future  - Hemoglobin A1C; Future  - VITAMIN B12; Future  Plan of care reviewed with patient. Discussed diabetes with the patient,overview of disease state,A1c, Foot inspections, exercise,  medications oral and insulin, diabetic neuropathy, and diet. Goal's of treatment reviewed: hemoglobin A1C <7.0 unless very frail then we shoot for <7.5-8.0, while avoiding HYPOGLYCEMIA, yearly checks on lipid profile  or more frequent if LDL not at goal of <100 without CAD or <70 with CAD, monitor for microalbuminuria to avoid CKD and to keep b/p at a goal.     2. Pure hypercholesterolemia  Discussed statin therapy - would like to start metformin and re-discuss atorvastatin next visit     3. Class 3 severe obesity due to excess calories without serious comorbidity with body mass index (BMI) of 50.0 to 59.9 in adult    I recommend the following therapeutic lifestyle changes:     Transition to a low salt, primarily plant-based diet which emphasizes:  Increase fiber with vegetables, whole grains, legumes   Increase lean protein by eating beans, legumes, fish, poultry, eggs, bison  Good dairy choices for lean protein include greek yogurt (low sugar options) and cottage cheese  Replacing butter with healthy fats, such as olive oil and nuts  Using herbs and spices instead of salt to flavor foods   Limiting red meat to no more than a few times a month   Limit added sugars (sodas, fruit juices, fancy coffee drinks)  Limit processed foods        Initiation of a graded aerobic exercise plan (goal 30-45 minutes per day 4-5 times per week)  Patient encouraged to participate in low intensity strength exercising and if unfamiliar with strength and conditioning training, I recommend joining a gym with access to a  to further instruct the patient.      If weight/obesity is a concern a reasonable weight loss goal of 1-2lbs per month to reach a goal of 5-10% weight loss in 5-6 months, or a BMI less than 25.      Medication List with Changes/Refills   New Medications    METFORMIN (GLUCOPHAGE-XR) 500 MG ER 24HR TABLET    Take 1 tablet (500 mg total) by mouth daily with breakfast for 7 days, THEN 1 tablet (500 mg total) 2 (two) times daily with meals.   Current Medications    ALPRAZOLAM (XANAX) 0.5 MG TABLET    Take 0.5 mg by mouth every evening.     Modified Medications    No medications on file     Monitor: A1c 3-6 months, cbc, cmp,  b12    Time spent with patient: 20 minutes    Patient with be reevaluated in 3 months or sooner prn    Greater than 50% of this visit was spent counseling as described in above documentation:Yes    Ame Mar MD, [facility]   Family Medicine Physician  04/16/2024      Hemoglobin A1C   Date Value Ref Range Status   04/03/2024 7.8 (H) 4.0 - 5.6 % Final     Comment:     ADA Screening Guidelines:  5.7-6.4%  Consistent with prediabetes  >or=6.5%  Consistent with diabetes    High levels of fetal hemoglobin interfere with the HbA1C  assay. Heterozygous hemoglobin variants (HbS, HgC, etc)do  not significantly interfere with this assay.   However, presence of multiple variants may affect accuracy.     03/09/2019 6.3 (H) 4.0 - 5.6 % Final     Comment:     ADA Screening Guidelines:  5.7-6.4%  Consistent with prediabetes  >or=6.5%  Consistent with diabetes  High levels of fetal hemoglobin interfere with the HbA1C  assay. Heterozygous hemoglobin variants (HbS, HgC, etc)do  not significantly interfere with this assay.   However, presence of multiple variants may affect accuracy.       Lab Results   Component Value Date    CHOL 233 (H) 04/03/2024    CHOL 245 (H) 08/02/2018     Lab Results   Component Value Date    HDL 47 04/03/2024    HDL 50 08/02/2018     Lab Results   Component Value Date    LDLCALC 161.6 (H) 04/03/2024    LDLCALC 167 (H) 08/02/2018     Lab Results   Component Value Date    TRIG 122 04/03/2024    TRIG 139 08/02/2018     Lab Results   Component Value Date    CHOLHDL 20.2 04/03/2024    CHOLHDL 4.9 08/02/2018           About The Book  Improve all areas of your health from your weight, sleep, cravings, mood, energy, skin, and even slow down aging, with easy-to-implement, science-based hacks to manage your blood sugar levels while still eating the foods you love.    Glucose, or blood sugar, is a tiny molecule in our body that has a huge impact on our health. It enters our bloodstream through the starchy or sweet  foods we eat. Ninety percent of us suffer from too much glucose in our system--and most of us don't know it.    The symptoms? Cravings, fatigue, infertility, hormonal issues, acne, wrinkles And over time, the development of conditions like type 2 diabetes, polycystic ovarian syndrome, cancer, dementia, and heart disease.    Drawing on cutting-edge science and her own pioneering research,  Kyung Hernandez offers ten simple, surprising hacks to help you balance your glucose levels and reverse your symptoms--without going on a diet or giving up the foods you love. For example:  * How eating foods in the right order will make you lose weight effortlessly  * What secret ingredient will allow you to eat dessert and still go into fat-burning mode  * What small change to your breakfast will unlock energy and cut your cravings    Both entertaining, informative, and packed with the latest scientific data, this book presents a new way to think about better health. Glucose Revolution is chock-full of tips that can drastically and immediately improve your life, whatever your dietary preferences.    About the author     Kyung Hernandez is on a mission to translate cutting-edge science into easy tips to help people improve their physical and mental health. Shes the  of the wildly popular [employer] account @[employer] where she teaches tens of thousands of people about healthy food habits. She holds a Bachelor of Science in mathematics from [employer], and a Master of Science in biochemistry from [employer]. 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 Glucose Revolution is her first book.

## 2024-04-25 ENCOUNTER — PATIENT MESSAGE (OUTPATIENT)
Dept: FAMILY MEDICINE | Facility: CLINIC | Age: 59
End: 2024-04-25
Payer: COMMERCIAL

## 2024-04-26 DIAGNOSIS — E11.9 DIABETES MELLITUS, NEW ONSET: Primary | ICD-10-CM

## 2024-04-26 RX ORDER — BLOOD-GLUCOSE SENSOR
EACH MISCELLANEOUS
Qty: 3 EACH | Refills: 11 | Status: SHIPPED | OUTPATIENT
Start: 2024-04-26

## 2024-06-06 ENCOUNTER — PATIENT MESSAGE (OUTPATIENT)
Dept: FAMILY MEDICINE | Facility: CLINIC | Age: 59
End: 2024-06-06
Payer: COMMERCIAL

## 2024-06-13 ENCOUNTER — TELEPHONE (OUTPATIENT)
Dept: FAMILY MEDICINE | Facility: CLINIC | Age: 59
End: 2024-06-13
Payer: COMMERCIAL

## 2024-06-13 ENCOUNTER — OFFICE VISIT (OUTPATIENT)
Dept: FAMILY MEDICINE | Facility: CLINIC | Age: 59
End: 2024-06-13
Payer: COMMERCIAL

## 2024-06-13 VITALS
BODY MASS INDEX: 39.17 KG/M2 | WEIGHT: 315 LBS | DIASTOLIC BLOOD PRESSURE: 80 MMHG | HEIGHT: 75 IN | HEART RATE: 90 BPM | TEMPERATURE: 98 F | OXYGEN SATURATION: 97 % | SYSTOLIC BLOOD PRESSURE: 124 MMHG

## 2024-06-13 DIAGNOSIS — J20.9 ACUTE BRONCHITIS, UNSPECIFIED ORGANISM: ICD-10-CM

## 2024-06-13 DIAGNOSIS — Z20.822 CLOSE EXPOSURE TO COVID-19 VIRUS: Primary | ICD-10-CM

## 2024-06-13 PROCEDURE — 99213 OFFICE O/P EST LOW 20 MIN: CPT | Mod: S$GLB,,, | Performed by: FAMILY MEDICINE

## 2024-06-13 PROCEDURE — 3074F SYST BP LT 130 MM HG: CPT | Mod: CPTII,S$GLB,, | Performed by: FAMILY MEDICINE

## 2024-06-13 PROCEDURE — 3051F HG A1C>EQUAL 7.0%<8.0%: CPT | Mod: CPTII,S$GLB,, | Performed by: FAMILY MEDICINE

## 2024-06-13 PROCEDURE — 1159F MED LIST DOCD IN RCRD: CPT | Mod: CPTII,S$GLB,, | Performed by: FAMILY MEDICINE

## 2024-06-13 PROCEDURE — 3079F DIAST BP 80-89 MM HG: CPT | Mod: CPTII,S$GLB,, | Performed by: FAMILY MEDICINE

## 2024-06-13 PROCEDURE — 3008F BODY MASS INDEX DOCD: CPT | Mod: CPTII,S$GLB,, | Performed by: FAMILY MEDICINE

## 2024-06-13 PROCEDURE — 99999 PR PBB SHADOW E&M-EST. PATIENT-LVL III: CPT | Mod: PBBFAC,,, | Performed by: FAMILY MEDICINE

## 2024-06-13 RX ORDER — AZITHROMYCIN 250 MG/1
TABLET, FILM COATED ORAL
Qty: 6 TABLET | Refills: 0 | Status: SHIPPED | OUTPATIENT
Start: 2024-06-13

## 2024-06-13 NOTE — PROGRESS NOTES
Subjective:       Patient ID: Bird Feng is a 59 y.o. male.    Chief Complaint: No chief complaint on file.    New to me patient here for UC visit.  Day 6 of sxs including fatigue, cough, HA, subj fever and chills, conjunctivitis, congestion and clogged ears.  4 of 5 family members positive for Covid; pt's home test was negative.  Cough is productive of yellow-green sputum.    Review of Systems   Constitutional:  Positive for chills, fatigue and fever.   HENT:  Positive for congestion and ear pain.    Respiratory:  Positive for cough. Negative for shortness of breath.    Cardiovascular:  Negative for chest pain.   Gastrointestinal:  Negative for abdominal pain and nausea.   Skin:  Negative for rash.   Neurological:  Negative for numbness.   All other systems reviewed and are negative.      Objective:      Physical Exam  Vitals reviewed.   Constitutional:       General: He is not in acute distress.     Appearance: He is well-developed.   HENT:      Right Ear: A middle ear effusion is present. Tympanic membrane is injected.      Left Ear: A middle ear effusion is present. Tympanic membrane is injected.      Nose: Mucosal edema and congestion present.      Mouth/Throat:      Pharynx: Posterior oropharyngeal erythema present.   Cardiovascular:      Rate and Rhythm: Normal rate and regular rhythm.      Heart sounds: No murmur heard.  Pulmonary:      Effort: Pulmonary effort is normal.      Breath sounds: Normal breath sounds. No wheezing or rales.      Comments: 97% O2 sat  Musculoskeletal:      Cervical back: Neck supple.   Lymphadenopathy:      Cervical: No cervical adenopathy.   Skin:     General: Skin is warm and dry.   Neurological:      Mental Status: He is alert.         Assessment:       1. Close exposure to COVID-19 virus    2. Acute bronchitis, unspecified organism        Plan:       Close exposure to COVID-19 virus  -     azithromycin (Z-EVIE) 250 MG tablet; As directed on pack  Dispense: 6 tablet; Refill:  0    Acute bronchitis, unspecified organism  -     azithromycin (Z-EVIE) 250 MG tablet; As directed on pack  Dispense: 6 tablet; Refill: 0      Patient Instructions   COVID vitamins:  Vitamin C 1,000 mg three times a day  Vitamin D3 5,000 IU once a day  (you should take this for life as well)  Zinc 50 mg twice a day      Aspirin 81 mg a day (clot prevention) 1-2 weeks    Push fluid intake - plenty of water.     Get original, behind the counter Sudafed - 30 mg and use as needed for congestion (ear/nose/sinus) up to three times a day.     Contact me or your PCP if any worsening or for any new concerns as we discussed.

## 2024-06-13 NOTE — TELEPHONE ENCOUNTER
Spoke with patient.  Instructed to arrive 15 minutes prior to Dr Sarabia appointment.    ----- Message from Cristina Azevedo sent at 6/13/2024 10:18 AM CDT -----  Contact: Patient  Type:  Patient Returning Call    Who Called:Patient     Who Left Message for Patient:Unknown    Does the patient know what this is regarding?:No    Would the patient rather a call back or a response via [facility]? Call    Best Call Back Number:[phone] (home) [phone] (work)    Additional Information: Please return call

## 2024-06-13 NOTE — PATIENT INSTRUCTIONS
COVID vitamins:  Vitamin C 1,000 mg three times a day  Vitamin D3 5,000 IU once a day  (you should take this for life as well)  Zinc 50 mg twice a day      Aspirin 81 mg a day (clot prevention) 1-2 weeks    Push fluid intake - plenty of water.     Get original, behind the counter Sudafed - 30 mg and use as needed for congestion (ear/nose/sinus) up to three times a day.     Contact me or your PCP if any worsening or for any new concerns as we discussed.

## 2024-07-16 ENCOUNTER — OFFICE VISIT (OUTPATIENT)
Dept: FAMILY MEDICINE | Facility: CLINIC | Age: 59
End: 2024-07-16
Payer: COMMERCIAL

## 2024-07-16 DIAGNOSIS — E78.00 PURE HYPERCHOLESTEROLEMIA: ICD-10-CM

## 2024-07-16 DIAGNOSIS — E11.9 DIABETES MELLITUS, NEW ONSET: Primary | ICD-10-CM

## 2024-07-16 PROBLEM — I48.0 PAROXYSMAL ATRIAL FIBRILLATION: Status: RESOLVED | Noted: 2018-07-23 | Resolved: 2024-07-16

## 2024-07-16 PROCEDURE — 1160F RVW MEDS BY RX/DR IN RCRD: CPT | Mod: CPTII,95,, | Performed by: STUDENT IN AN ORGANIZED HEALTH CARE EDUCATION/TRAINING PROGRAM

## 2024-07-16 PROCEDURE — 1159F MED LIST DOCD IN RCRD: CPT | Mod: CPTII,95,, | Performed by: STUDENT IN AN ORGANIZED HEALTH CARE EDUCATION/TRAINING PROGRAM

## 2024-07-16 PROCEDURE — 99214 OFFICE O/P EST MOD 30 MIN: CPT | Mod: 95,,, | Performed by: STUDENT IN AN ORGANIZED HEALTH CARE EDUCATION/TRAINING PROGRAM

## 2024-07-16 PROCEDURE — 3051F HG A1C>EQUAL 7.0%<8.0%: CPT | Mod: CPTII,95,, | Performed by: STUDENT IN AN ORGANIZED HEALTH CARE EDUCATION/TRAINING PROGRAM

## 2024-07-16 NOTE — PROGRESS NOTES
"[facility]    Subjective:    Chief Complaint: No chief complaint on file.    Patient is here for telemedicine visit  The patient location is: [place]     Visit type: Virtual visit with synchronous audio and video  Each patient to whom he or she provides medical services by telemedicine is:  (1) informed of the relationship between the physician and patient and the respective role of any other health care provider with respect to management of the patient; and (2) notified that he or she may decline to receive medical services by telemedicine and may withdraw from such care at any time.    The following portions of the patient's history were reviewed and updated as appropriate: allergies, current medications, past family history, past medical history, past social history, past surgical history and problem list.    He  has a past medical history of Atrial fibrillation (), Atrial fibrillation, Hypertension, Obese, and Sleep apnea.  He  has a past surgical history that includes Cardioversion (); Tonsillectomy; and Open reduction and internal fixation (ORIF) of fracture of rib (Left, 3/15/2019).  He  reports that he has never smoked. He has never used smokeless tobacco. He reports current alcohol use of about 4.2 standard drinks of alcohol per week. He reports that he does not use drugs.  He family history includes Abnormal  screen in his father; Heart disease in his father; Hypertension in his father and mother.    Review of patient's allergies indicates:  No Known Allergies  Objective:      Physical Examination  Wt Readings from Last 3 Encounters:   24 (!) 179.5 kg (395 lb 11.6 oz)   24 (!) 184.3 kg (406 lb 4.9 oz)   23 (!) 175.4 kg (386 lb 9.2 oz)     BP Readings from Last 3 Encounters:   24 124/80   24 116/86   23 134/80     Estimated body mass index is 49.46 kg/m² as calculated from the following:    Height as of 24: 6' 3" (1.905 m).    " Weight as of 6/13/24: 179.5 kg (395 lb 11.6 oz).     CONSTITUTIONAL: No apparent distress. Does not appear acutely ill or septic. Appears adequately hydrated.  PULM: Breathing unlabored.  PSYCHIATRIC: Alert and conversant and grossly oriented. Mood is grossly neutral. Affect appropriate. Judgment and insight grossly intact.  Integument: normal coloration and turgor, no rashes, no suspicious skin lesions noted.    Data reviewed  Previous medical records reviewed and summarized in HPI.     Assessment:       Bird Feng is a 59 y.o. male who presents with:    1. Diabetes mellitus, new onset    2. Pure hypercholesterolemia      1. Diabetes mellitus, new onset  metFORMIN (GLUCOPHAGE-XR) 500 MG ER bid - tolerating well  S/e: mild occ diarrhea   Dexcom showing A1c 6.8   Experimenting with higher protein  Fasting glucose 120-200     2. Pure hypercholesterolemia  Discussed statin therapy risk vs benefits - patient requesting more time to look into the medication information   LDL goal < 100 (currently 161)    The 10-year ASCVD risk score (Matteo LUGO, et al., 2019) is: 9.8%    Values used to calculate the score:      Age: 59 years      Sex: Male      Is Non- : No      Diabetic: No      Tobacco smoker: No      Systolic Blood Pressure: 124 mmHg      Is BP treated: No      HDL Cholesterol: 47 mg/dL      Total Cholesterol: 233 mg/dL      Plan:   1. Diabetes mellitus, new onset  - Comprehensive Metabolic Panel; Future  - Hemoglobin A1C; Future  - VITAMIN B12; Future  - CBC Auto Differential; Future  - Comprehensive Metabolic Panel; Future  - Hemoglobin A1C; Future  - Lipid Panel; Future  - LIPOPROTEIN A (LPA); Future  Discussed high protein and fiber, exercise for glucose control     2. Pure hypercholesterolemia  - Comprehensive Metabolic Panel; Future  - Lipid Panel; Future  Recommend starting statin therapy     Medication List with Changes/Refills   Current Medications    ALPRAZOLAM (XANAX) 0.5 MG TABLET     Take 0.5 mg by mouth every evening.    CopinyCOM G7 SENSOR RAD    Use 1 device every 10 days to monitor glucose    METFORMIN (GLUCOPHAGE-XR) 500 MG ER 24HR TABLET    Take 1 tablet (500 mg total) by mouth daily with breakfast for 7 days, THEN 1 tablet (500 mg total) 2 (two) times daily with meals.   Discontinued Medications    AZITHROMYCIN (Z-EVIE) 250 MG TABLET    As directed on pack     Modified Medications    No medications on file       Time spent with patient: 20 minutes    Patient with be reevaluated in 3 months or sooner prn    Greater than 50% of this visit was spent counseling as described in above documentation:Yes    Ame Mar MD, [facility]   Family Medicine Physician  07/16/2024            Jaymie T, Santi C, Tammie M, Bharti O, Jerry H, Mikyala Y, Eryn DAVIS. A case of prominent coronary plaque regression with statin therapy. J Cardiovasc Comput Tomogr. 2020 May-[place];14(3):275-276. doi: 10.1016/[email or web address].2019.05.002. Epub 2019 May 3. PMID: [id number].

## 2024-07-16 NOTE — PATIENT INSTRUCTIONS
The Scoop on Statins: Things You Should Know.       Heart disease is the number one cause of death in the [place]. Around 1 in 20 adults aged 20 and older have coronary artery disease [1] and every 33 seconds a person dies from cardiovascular disease in the [place] [2]. The good news is that you can reduce your risk.    What are statins and how do they work?    Statins are a medication that is effective at lowering cholesterol and your risk of heart attack and stroke.     Statins lower low-density lipoprotein (LDL) cholesterol known as the bad cholesterol. Over time, just like the pipes in your house, your arteries or pipes can become filled with waxy cholesterol plaques that can build up and block blood flow to your heart. This can lead to heart attacks and strokes. Statins can draw the cholesterol out of plaques and slow down the production of cholesterol in the liver.       Figure 1: Reproduced from: What Is Atherosclerosis? National Heart, Lung, and Blood [place]. Available at http://www.nhlbi.nih.gov/health/health-topics/topics/atherosclerosis/.    What are the benefits and potential risks of statins?    Statins can decrease the risk of major heart events by 31% and have a 21% decrease in death from heart disease [4]. In addition to lowering bad cholesterol, status increase your HDL or good cholesterol. They also are known to have anti-inflammatory properties, decrease the risk of blood clots, and improve the lining of blood vessels [5].     Statins are safe and effective for most people, but there are some rare side effects that impact a small number of patients. Most patients do not experience any side effects and up to 90% of patients do not experience muscle aches. Statins may also mildly increase glucose levels [6]. If a patient experiences side effects your doctor might try a different statin, lower dose, or decrease the frequency.     There are some common misconceptions about the  potential side effects and risks of taking statins which have been debunked in scientific studies. Common myths include statins having a negative effect on liver health and cognitive health. Scientific studies monitoring thousands of patients have shown that statins do not cause dementia. Use of statins can improve your long-term cognitive and overall health by reducing your risk of stroke and heart disease.     Who are prescribed statins?    Statins are used to treat patients with high cholesterol and many doctors prescribe statins for people at risk of heart disease and stroke. Statins are also recommended for patients with diabetes, those with elevated coronary calcium scores and patients with a history of stroke, heart attack, and other cardiovascular events.     Talk to your doctor about your risk and whether statins might be right for you.    References    1.   Markus CW, Preston AW, Saige ZI, Johnathan CAM, Efren P, Hu CL, Zion CM, Tonny AZ, Elo AK, Melo AE, Poulsbo-Jahaira Y, Elturner MSV, Sathya KR, Servando-Avism C, Grant S, Hector G, Tiburcio DG, Hirematreinier S, Grant JE, Debora R, Yue DS, Samuel D, Joie DA, Torey J, Ma J, Magnani JW, Rupal ED, Lady ME, Mohsen SD, Mark NI, Demian R, Naz M, Carlton GA, Osorio NS, On MP, Anselmo EL, Brice SS, Diana JH, Royce NY, Ted ND, Ted SS, Eyal K, Pelon SS; American Heart Association [place] on Epidemiology and Prevention Statistics Committee and Stroke Statistics Subcommittee. Heart Disease and Stroke Statistics-2023 Update: A Report From the American Heart Association. Circulation. 2023 Feb 21;147(8):j27-z229. doi: 10.1161/CIR.[id number]. Epub 2023 Jan 25. Erratum in: Circulation. 2023 Feb 21;147(8):e622. Erratum in: Circulation. 2023 Jul 25;148(4):e4. PMID: 06111836.  2. National Center for Health Statistics. Multiple Cause of Death 6969-6412 on [facility] WONDER Database. Accessed October 28, 2023.  3. Meseret Celis Macreadie I.  "Exploitation of Aspergillus terreus for the Production of Natural Statins. J Fungi ([facility] [place]). 2016 Apr 30;2(2):13. doi: 10.3390/syt2455222. PMID: 58586897; PMCID: ZYI3580786.  4. Luanne PALMER, Richard J, Mariajose S. Effect of statins on risk of coronary disease: a meta-analysis of randomized controlled trials. JHON. 1999 Dec 22-29;282(24):2340-6. doi: 10.1001/jhon.282.24.2340. PMID: 97746553.  5. Adriana-Epifanio I, Casal-Dominguez M, Sindi AL. Statins: pros and cons. Med Clin ([facility]). 2018 May 23;150(10):398-402. doi: 10.1016/j.medcli.2017.11.030. Epub 2017 Dec 29. PMID: 66706285; PMCID: [id number].  6. Devonte S, Sabino A, Sabino S. Statin Therapy: Review of Safety and Potential Side Effects. Acta Cardiol Sin. 2016 Nov;32(6):631-639. doi: 10.6515/whv97263957f. PMID: 94987262; PMCID: LNU3914514.              Jaymie T, Faizanama C, Tammie M, Okazaki O, Jerry H, Mikayla Y, Eryn M. A case of prominent coronary plaque regression with statin therapy. J Cardiovasc Comput Tomogr. 2020 May-[place];14(3):275-276. doi: 10.1016/j.jcct.2019.05.002. Epub 2019 May 3. PMID: 30940029.          As an analogy: if there are 1000 people on the road, LDL cholesterol will give you the number of people but apo B tells you whether it is 1000 cars or 20 buses. 1000 cars means that the traffic is much worse. Apo B tells you the true extent of the traffic and thus the risk for a heart attack or stroke.       A simplified way to think about apo B particles is like the sticky plant burrs and just like how they can easily stick to your clothes they can get stuck in your arterial walls leading to cholesterol plaque buildup.         Takeaway: Apo-B provides an important risk estimation for cardiovascular disease that is not traditionally measured by only LDL or non-HDL cholesterol.    "Observations  Cholesterol can only enter the arterial wall within apoB particles. However, the mass of cholesterol per apoB particle is variable. Therefore, the mass of " "cholesterol that will be deposited within the arterial wall is determined by the number of apoB particles that are trapped within the arterial wall. The number of apoB particles that enter the arterial wall is determined primarily by the number of apoB particles within the arterial lumen. However, once within the arterial wall, smaller cholesterol-depleted apoB particles have a greater tendency to be trapped than larger cholesterol-enriched apoB particles because they bind more avidly to the glycosaminoglycans within the subintimal space of the arterial wall. Thus, a cholesterol-enriched particle would deposit more cholesterol than a cholesterol-depleted apoB particle whereas more, smaller apoB particles that enter the arterial wall will be trapped than larger apoB particles. The net result is, with the exceptions of the abnormal chylomicron remnants in type III hyperlipoproteinemia and lipoprotein (a), all apoB particles are equally atherogenic.    Conclusions and Relevance  Apolipoprotein B unifies, amplifies, and simplifies the information from the conventional lipid markers as to the atherogenic risk attributable to the apoB lipoproteins."    Kortney GALVAN, Kristen G, Gwendolyn T, Kelvin AM, Devika M, Masha A, Shala LOOMIS. Apolipoprotein B Particles and Cardiovascular Disease: A Narrative Review. KULDIP Cardiol. 2019 Dec 1;4(12):7499-8882. doi: 10.1001/jamacardio.2019.3780. PMID: 85663367; PMCID: [id number].    "

## 2024-07-17 DIAGNOSIS — E11.9 TYPE 2 DIABETES MELLITUS WITHOUT COMPLICATION, UNSPECIFIED WHETHER LONG TERM INSULIN USE: ICD-10-CM

## 2024-07-24 DIAGNOSIS — E11.9 TYPE 2 DIABETES MELLITUS WITHOUT COMPLICATION, UNSPECIFIED WHETHER LONG TERM INSULIN USE: ICD-10-CM

## 2024-07-29 DIAGNOSIS — M25.561 RIGHT KNEE PAIN, UNSPECIFIED CHRONICITY: Primary | ICD-10-CM

## 2024-08-01 ENCOUNTER — HOSPITAL ENCOUNTER (OUTPATIENT)
Dept: RADIOLOGY | Facility: HOSPITAL | Age: 59
Discharge: HOME | End: 2024-08-01
Attending: ORTHOPAEDIC SURGERY
Payer: COMMERCIAL

## 2024-08-01 ENCOUNTER — OFFICE VISIT (OUTPATIENT)
Dept: ORTHOPEDICS | Facility: CLINIC | Age: 59
End: 2024-08-01
Payer: COMMERCIAL

## 2024-08-01 VITALS — HEIGHT: 75 IN | BODY MASS INDEX: 39.17 KG/M2 | WEIGHT: 315 LBS

## 2024-08-01 DIAGNOSIS — M25.561 RIGHT KNEE PAIN, UNSPECIFIED CHRONICITY: Primary | ICD-10-CM

## 2024-08-01 DIAGNOSIS — M25.561 RIGHT KNEE PAIN, UNSPECIFIED CHRONICITY: ICD-10-CM

## 2024-08-01 DIAGNOSIS — M17.10 ARTHRITIS OF KNEE: ICD-10-CM

## 2024-08-01 PROCEDURE — 3061F NEG MICROALBUMINURIA REV: CPT | Mod: CPTII,S$GLB,, | Performed by: ORTHOPAEDIC SURGERY

## 2024-08-01 PROCEDURE — 99999 PR PBB SHADOW E&M-EST. PATIENT-LVL III: CPT | Mod: PBBFAC,,, | Performed by: ORTHOPAEDIC SURGERY

## 2024-08-01 PROCEDURE — 73564 X-RAY EXAM KNEE 4 OR MORE: CPT | Mod: 26,RT,, | Performed by: RADIOLOGY

## 2024-08-01 PROCEDURE — 3008F BODY MASS INDEX DOCD: CPT | Mod: CPTII,S$GLB,, | Performed by: ORTHOPAEDIC SURGERY

## 2024-08-01 PROCEDURE — 73562 X-RAY EXAM OF KNEE 3: CPT | Mod: 26,59,LT, | Performed by: RADIOLOGY

## 2024-08-01 PROCEDURE — 3066F NEPHROPATHY DOC TX: CPT | Mod: CPTII,S$GLB,, | Performed by: ORTHOPAEDIC SURGERY

## 2024-08-01 PROCEDURE — 1160F RVW MEDS BY RX/DR IN RCRD: CPT | Mod: CPTII,S$GLB,, | Performed by: ORTHOPAEDIC SURGERY

## 2024-08-01 PROCEDURE — 73564 X-RAY EXAM KNEE 4 OR MORE: CPT | Mod: TC,PO,RT

## 2024-08-01 PROCEDURE — 3051F HG A1C>EQUAL 7.0%<8.0%: CPT | Mod: CPTII,S$GLB,, | Performed by: ORTHOPAEDIC SURGERY

## 2024-08-01 PROCEDURE — 1159F MED LIST DOCD IN RCRD: CPT | Mod: CPTII,S$GLB,, | Performed by: ORTHOPAEDIC SURGERY

## 2024-08-01 PROCEDURE — 99204 OFFICE O/P NEW MOD 45 MIN: CPT | Mod: S$GLB,,, | Performed by: ORTHOPAEDIC SURGERY

## 2024-08-01 PROCEDURE — 73562 X-RAY EXAM OF KNEE 3: CPT | Mod: TC,PO,LT

## 2024-08-01 NOTE — PROGRESS NOTES
Past Medical History:   Diagnosis Date    Atrial fibrillation 2006    Cardioversion    Atrial fibrillation     Hypertension     Obese     Sleep apnea        Past Surgical History:   Procedure Laterality Date    CARDIOVERSION  2006    X2    OPEN REDUCTION AND INTERNAL FIXATION (ORIF) OF FRACTURE OF RIB Left 3/15/2019    Procedure: ORIF, FRACTURE, RIBS;  Surgeon: Eric Garrett MD;  Location: [facility];  Service: Cardiothoracic;  Laterality: Left;    TONSILLECTOMY         Current Outpatient Medications   Medication Sig    ALPRAZolam (XANAX) 0.5 MG tablet Take 0.5 mg by mouth every evening.    DEXCOM G7 SENSOR Sheila Use 1 device every 10 days to monitor glucose    metFORMIN (GLUCOPHAGE-XR) 500 MG ER 24hr tablet Take 1 tablet (500 mg total) by mouth daily with breakfast for 7 days, THEN 1 tablet (500 mg total) 2 (two) times daily with meals.     No current facility-administered medications for this visit.       Review of patient's allergies indicates:  No Known Allergies    Family History   Problem Relation Name Age of Onset    Hypertension Mother      Hypertension Father      Heart disease Father      Abnormal  screen Father         Social History     Socioeconomic History    Marital status:    Tobacco Use    Smoking status: Never    Smokeless tobacco: Never   Substance and Sexual Activity    Alcohol use: Yes     Alcohol/week: 4.2 standard drinks of alcohol     Types: 5 drink(s) per week     Comment: Socially    Drug use: No     Social Determinants of Health     Financial Resource Strain: Low Risk  (2024)    Overall Financial Resource Strain (CARDIA)     Difficulty of Paying Living Expenses: Not hard at all   Food Insecurity: No Food Insecurity (2024)    Hunger Vital Sign     Worried About Running Out of Food in the Last Year: Never true     Ran Out of Food in the Last Year: Never true   Transportation Needs: No Transportation Needs (2024)    PRAPARE - Transportation     Lack of  Transportation (Medical): No     Lack of Transportation (Non-Medical): No   Physical Activity: Insufficiently Active (7/16/2024)    Exercise Vital Sign     Days of Exercise per Week: 3 days     Minutes of Exercise per Session: 30 min   Stress: Stress Concern Present (7/16/2024)    [employer] - Occupational Stress Questionnaire     Feeling of Stress : Very much   Housing Stability: Low Risk  (4/1/2024)    Housing Stability Vital Sign     Unable to Pay for Housing in the Last Year: No     Number of Places Lived in the Last Year: 1     Unstable Housing in the Last Year: No       Chief Complaint: No chief complaint on file.      History of present illness:  59-year-old male seen for right knee injury.  Patient fell in a hole in his yard about a week ago.  Since then he has had some giving way sensation along the medial aspect of his knee.  Does not really have a lot of pain.  No prior problems with his knee.  Rates his pain today is 0/10.  Does have a little swelling but it has been improving.        Review of Systems:    Constitution: Negative for chills, fever, and sweats.  Negative for unexplained weight loss.    HENT:  Negative for headaches and blurry vision.    Cardiovascular:Negative for chest pain or irregular heart beat. Negative for hypertension.    Respiratory:  Negative for cough and shortness of breath.    Gastrointestinal: Negative for abdominal pain, heartburn, melena, nausea, and vomitting.    Genitourinary:  Negative bladder incontinence and dysuria.    Musculoskeletal:  See HPI    Neurological: Negative for numbness.    Psychiatric/Behavioral: Negative for depression.  The patient is not nervous/anxious.      Endocrine: Negative for polyuria    Hematologic/Lymphatic: Negative for bleeding problem.  Does not bruise/bleed easily.    Skin: Negative for poor would healing and rash      Physical Examination:    Vital Signs:  There were no vitals filed for this visit.    There  is no height or weight on file to calculate BMI.    This a well-developed, well nourished patient in no acute distress.  They are alert and oriented and cooperative to examination.  Pt. walks without an antalgic gait.      Examination of the right knee shows no rashes or erythema. There are no masses ecchymosis and a small effusion. Patient has full range of motion from 0-130°. Patient is nontender to palpation over lateral joint line and minimally tender to palpation over the medial joint line. Patient has a - Lachman exam, - anterior drawer exam, and - posterior drawer exam. - Apley exam. Knee is stable to varus and valgus stress. 5 out of 5 motor strength. Palpable distal pulses. Intact light touch sensation. Negative Patellofemoral crepitus      X-rays:  X-rays of the right knee are ordered and reviewed which show moderate medial narrowing bilaterally.  Some moderate patellofemoral arthritis as well.         Assessment:  Right knee arthritis with possible degenerative medial meniscal tear    Plan:  I reviewed the findings with him today.  Offered him aspiration and injection of his right knee but the patient declined.  I encouraged him to use some NSAIDs as well as ice and compression.  Patient will follow up if needed.  We talked about possibly needing an MRI if he continues to have giving way.            All previous pertinent notes including ER visits, physical therapy visits, other orthopedic visits as well as other care for the same musculoskeletal problem were reviewed.  All pertinent lab values and previous imaging was reviewed pertinent to the current visit.    This note was created using [employer] voice recognition software that occasionally misinterpreted phrases or words.    Consult note is delivered via Epic messaging service.

## 2024-08-05 ENCOUNTER — PATIENT OUTREACH (OUTPATIENT)
Dept: ADMINISTRATIVE | Facility: HOSPITAL | Age: 59
End: 2024-08-05
Payer: COMMERCIAL

## 2024-09-20 ENCOUNTER — PATIENT MESSAGE (OUTPATIENT)
Dept: ADMINISTRATIVE | Facility: HOSPITAL | Age: 59
End: 2024-09-20
Payer: COMMERCIAL

## 2024-09-20 ENCOUNTER — PATIENT OUTREACH (OUTPATIENT)
Dept: ADMINISTRATIVE | Facility: HOSPITAL | Age: 59
End: 2024-09-20
Payer: COMMERCIAL

## 2024-10-23 ENCOUNTER — LAB VISIT (OUTPATIENT)
Dept: LAB | Facility: HOSPITAL | Age: 59
End: 2024-10-23
Attending: STUDENT IN AN ORGANIZED HEALTH CARE EDUCATION/TRAINING PROGRAM
Payer: COMMERCIAL

## 2024-10-23 ENCOUNTER — OFFICE VISIT (OUTPATIENT)
Dept: FAMILY MEDICINE | Facility: CLINIC | Age: 59
End: 2024-10-23
Payer: COMMERCIAL

## 2024-10-23 VITALS
OXYGEN SATURATION: 95 % | WEIGHT: 315 LBS | HEIGHT: 75 IN | DIASTOLIC BLOOD PRESSURE: 82 MMHG | BODY MASS INDEX: 39.17 KG/M2 | HEART RATE: 90 BPM | SYSTOLIC BLOOD PRESSURE: 120 MMHG | TEMPERATURE: 98 F

## 2024-10-23 DIAGNOSIS — E66.01 CLASS 3 SEVERE OBESITY DUE TO EXCESS CALORIES WITHOUT SERIOUS COMORBIDITY WITH BODY MASS INDEX (BMI) OF 50.0 TO 59.9 IN ADULT: ICD-10-CM

## 2024-10-23 DIAGNOSIS — Z12.12 ENCOUNTER FOR SCREENING FOR COLORECTAL MALIGNANT NEOPLASM: ICD-10-CM

## 2024-10-23 DIAGNOSIS — E11.9 DIABETES MELLITUS, NEW ONSET: ICD-10-CM

## 2024-10-23 DIAGNOSIS — R10.13 EPIGASTRIC ABDOMINAL PAIN: ICD-10-CM

## 2024-10-23 DIAGNOSIS — E66.813 CLASS 3 SEVERE OBESITY DUE TO EXCESS CALORIES WITHOUT SERIOUS COMORBIDITY WITH BODY MASS INDEX (BMI) OF 50.0 TO 59.9 IN ADULT: ICD-10-CM

## 2024-10-23 DIAGNOSIS — E78.00 PURE HYPERCHOLESTEROLEMIA: ICD-10-CM

## 2024-10-23 DIAGNOSIS — E11.9 DIABETES MELLITUS, NEW ONSET: Primary | ICD-10-CM

## 2024-10-23 DIAGNOSIS — G47.33 OSA ON CPAP: ICD-10-CM

## 2024-10-23 DIAGNOSIS — Z12.11 ENCOUNTER FOR SCREENING FOR COLORECTAL MALIGNANT NEOPLASM: ICD-10-CM

## 2024-10-23 LAB
ALBUMIN SERPL BCP-MCNC: 3.6 G/DL (ref 3.5–5.2)
ALBUMIN SERPL BCP-MCNC: 3.6 G/DL (ref 3.5–5.2)
ALP SERPL-CCNC: 64 U/L (ref 40–150)
ALP SERPL-CCNC: 64 U/L (ref 40–150)
ALT SERPL W/O P-5'-P-CCNC: 20 U/L (ref 10–44)
ALT SERPL W/O P-5'-P-CCNC: 20 U/L (ref 10–44)
ANION GAP SERPL CALC-SCNC: 10 MMOL/L (ref 8–16)
ANION GAP SERPL CALC-SCNC: 10 MMOL/L (ref 8–16)
AST SERPL-CCNC: 12 U/L (ref 10–40)
AST SERPL-CCNC: 12 U/L (ref 10–40)
BILIRUB SERPL-MCNC: 0.6 MG/DL (ref 0.1–1)
BILIRUB SERPL-MCNC: 0.6 MG/DL (ref 0.1–1)
BUN SERPL-MCNC: 13 MG/DL (ref 6–20)
BUN SERPL-MCNC: 13 MG/DL (ref 6–20)
CALCIUM SERPL-MCNC: 9.1 MG/DL (ref 8.7–10.5)
CALCIUM SERPL-MCNC: 9.1 MG/DL (ref 8.7–10.5)
CHLORIDE SERPL-SCNC: 107 MMOL/L (ref 95–110)
CHLORIDE SERPL-SCNC: 107 MMOL/L (ref 95–110)
CHOLEST SERPL-MCNC: 225 MG/DL (ref 120–199)
CHOLEST/HDLC SERPL: 4.4 {RATIO} (ref 2–5)
CO2 SERPL-SCNC: 21 MMOL/L (ref 23–29)
CO2 SERPL-SCNC: 21 MMOL/L (ref 23–29)
CREAT SERPL-MCNC: 1 MG/DL (ref 0.5–1.4)
CREAT SERPL-MCNC: 1 MG/DL (ref 0.5–1.4)
EST. GFR  (NO RACE VARIABLE): >60 ML/MIN/1.73 M^2
EST. GFR  (NO RACE VARIABLE): >60 ML/MIN/1.73 M^2
ESTIMATED AVG GLUCOSE: 163 MG/DL (ref 68–131)
GLUCOSE SERPL-MCNC: 186 MG/DL (ref 70–110)
GLUCOSE SERPL-MCNC: 186 MG/DL (ref 70–110)
HBA1C MFR BLD: 7.3 % (ref 4–5.6)
HDLC SERPL-MCNC: 51 MG/DL (ref 40–75)
HDLC SERPL: 22.7 % (ref 20–50)
LDLC SERPL CALC-MCNC: 145 MG/DL (ref 63–159)
NONHDLC SERPL-MCNC: 174 MG/DL
POTASSIUM SERPL-SCNC: 4.3 MMOL/L (ref 3.5–5.1)
POTASSIUM SERPL-SCNC: 4.3 MMOL/L (ref 3.5–5.1)
PROT SERPL-MCNC: 6.9 G/DL (ref 6–8.4)
PROT SERPL-MCNC: 6.9 G/DL (ref 6–8.4)
SODIUM SERPL-SCNC: 138 MMOL/L (ref 136–145)
SODIUM SERPL-SCNC: 138 MMOL/L (ref 136–145)
TRIGL SERPL-MCNC: 145 MG/DL (ref 30–150)
VIT B12 SERPL-MCNC: 352 PG/ML (ref 210–950)

## 2024-10-23 PROCEDURE — 3074F SYST BP LT 130 MM HG: CPT | Mod: CPTII,S$GLB,, | Performed by: STUDENT IN AN ORGANIZED HEALTH CARE EDUCATION/TRAINING PROGRAM

## 2024-10-23 PROCEDURE — 99999 PR PBB SHADOW E&M-EST. PATIENT-LVL IV: CPT | Mod: PBBFAC,,, | Performed by: STUDENT IN AN ORGANIZED HEALTH CARE EDUCATION/TRAINING PROGRAM

## 2024-10-23 PROCEDURE — 82607 VITAMIN B-12: CPT | Performed by: STUDENT IN AN ORGANIZED HEALTH CARE EDUCATION/TRAINING PROGRAM

## 2024-10-23 PROCEDURE — 80053 COMPREHEN METABOLIC PANEL: CPT | Performed by: STUDENT IN AN ORGANIZED HEALTH CARE EDUCATION/TRAINING PROGRAM

## 2024-10-23 PROCEDURE — 83036 HEMOGLOBIN GLYCOSYLATED A1C: CPT | Performed by: STUDENT IN AN ORGANIZED HEALTH CARE EDUCATION/TRAINING PROGRAM

## 2024-10-23 PROCEDURE — 85025 COMPLETE CBC W/AUTO DIFF WBC: CPT | Performed by: STUDENT IN AN ORGANIZED HEALTH CARE EDUCATION/TRAINING PROGRAM

## 2024-10-23 PROCEDURE — 99214 OFFICE O/P EST MOD 30 MIN: CPT | Mod: S$GLB,,, | Performed by: STUDENT IN AN ORGANIZED HEALTH CARE EDUCATION/TRAINING PROGRAM

## 2024-10-23 PROCEDURE — 1159F MED LIST DOCD IN RCRD: CPT | Mod: CPTII,S$GLB,, | Performed by: STUDENT IN AN ORGANIZED HEALTH CARE EDUCATION/TRAINING PROGRAM

## 2024-10-23 PROCEDURE — 3066F NEPHROPATHY DOC TX: CPT | Mod: CPTII,S$GLB,, | Performed by: STUDENT IN AN ORGANIZED HEALTH CARE EDUCATION/TRAINING PROGRAM

## 2024-10-23 PROCEDURE — 86677 HELICOBACTER PYLORI ANTIBODY: CPT | Performed by: STUDENT IN AN ORGANIZED HEALTH CARE EDUCATION/TRAINING PROGRAM

## 2024-10-23 PROCEDURE — 3008F BODY MASS INDEX DOCD: CPT | Mod: CPTII,S$GLB,, | Performed by: STUDENT IN AN ORGANIZED HEALTH CARE EDUCATION/TRAINING PROGRAM

## 2024-10-23 PROCEDURE — 3061F NEG MICROALBUMINURIA REV: CPT | Mod: CPTII,S$GLB,, | Performed by: STUDENT IN AN ORGANIZED HEALTH CARE EDUCATION/TRAINING PROGRAM

## 2024-10-23 PROCEDURE — 83695 ASSAY OF LIPOPROTEIN(A): CPT | Performed by: STUDENT IN AN ORGANIZED HEALTH CARE EDUCATION/TRAINING PROGRAM

## 2024-10-23 PROCEDURE — G2211 COMPLEX E/M VISIT ADD ON: HCPCS | Mod: S$GLB,,, | Performed by: STUDENT IN AN ORGANIZED HEALTH CARE EDUCATION/TRAINING PROGRAM

## 2024-10-23 PROCEDURE — 3079F DIAST BP 80-89 MM HG: CPT | Mod: CPTII,S$GLB,, | Performed by: STUDENT IN AN ORGANIZED HEALTH CARE EDUCATION/TRAINING PROGRAM

## 2024-10-23 PROCEDURE — 3051F HG A1C>EQUAL 7.0%<8.0%: CPT | Mod: CPTII,S$GLB,, | Performed by: STUDENT IN AN ORGANIZED HEALTH CARE EDUCATION/TRAINING PROGRAM

## 2024-10-23 PROCEDURE — 1160F RVW MEDS BY RX/DR IN RCRD: CPT | Mod: CPTII,S$GLB,, | Performed by: STUDENT IN AN ORGANIZED HEALTH CARE EDUCATION/TRAINING PROGRAM

## 2024-10-23 PROCEDURE — 80061 LIPID PANEL: CPT | Performed by: STUDENT IN AN ORGANIZED HEALTH CARE EDUCATION/TRAINING PROGRAM

## 2024-10-23 RX ORDER — METFORMIN HYDROCHLORIDE 500 MG/1
500 TABLET, EXTENDED RELEASE ORAL 2 TIMES DAILY WITH MEALS
Qty: 180 TABLET | Refills: 1 | Status: SHIPPED | OUTPATIENT
Start: 2024-10-23 | End: 2025-04-21

## 2024-10-23 NOTE — PROGRESS NOTES
[facility] Note    Subjective:  Chief Complaint:   Chief Complaint   Patient presents with    Follow-up    Diabetes       History of Present Illness:  Bird is here for routine follow up     DM  metFORMIN (GLUCOPHAGE-XR) 500 MG ER bid - occ forgets to take   Dexcom tried for a few months , was within range   Diet: higher protein  Stomach pains resolved by eating, chronic - makes it difficult to have calorie deficit as he feels he needs to eat frequently 2/2 pain     HLD    The 10-year ASCVD risk score (Matteo LUGO, et al., 2019) is: 17.1%    Values used to calculate the score:      Age: 59 years      Sex: Male      Is Non- : No      Diabetic: Yes      Tobacco smoker: No      Systolic Blood Pressure: 120 mmHg      Is BP treated: No      HDL Cholesterol: 47 mg/dL      Total Cholesterol: 233 mg/dL      CRC Screening: discussed risks vs benefits of cologuard vs colonoscopy   Recommend influenza vaccination Declined     Allergies:  Review of patient's allergies indicates:  No Known Allergies    Home Medications:  Current Outpatient Medications on File Prior to Visit   Medication Sig    ALPRAZolam (XANAX) 0.5 MG tablet Take 0.5 mg by mouth every evening.    [DISCONTINUED] metFORMIN (GLUCOPHAGE-XR) 500 MG ER 24hr tablet Take 1 tablet (500 mg total) by mouth daily with breakfast for 7 days, THEN 1 tablet (500 mg total) 2 (two) times daily with meals.    [DISCONTINUED] DEXCOM G7 SENSOR Sheila Use 1 device every 10 days to monitor glucose (Patient not taking: Reported on 10/23/2024)     No current facility-administered medications on file prior to visit.       Past Medical History:   Diagnosis Date    Atrial fibrillation 2006    Cardioversion    Atrial fibrillation     Hypertension     Obese     Sleep apnea      Past Surgical History:   Procedure Laterality Date    CARDIOVERSION  2006    X2    OPEN REDUCTION AND INTERNAL FIXATION (ORIF) OF FRACTURE OF RIB Left 3/15/2019    Procedure: ORIF,  FRACTURE, RIBS;  Surgeon: Eric Garrett MD;  Location: [facility];  Service: Cardiothoracic;  Laterality: Left;    TONSILLECTOMY       Family History   Problem Relation Name Age of Onset    Hypertension Mother      Hypertension Father      Heart disease Father      Abnormal  screen Father       Social History     Tobacco Use    Smoking status: Never    Smokeless tobacco: Never   Substance Use Topics    Alcohol use: Yes     Alcohol/week: 4.2 standard drinks of alcohol     Types: 5 drink(s) per week     Comment: Socially    Drug use: No            The patient's past medical history, surgical history, social history, family history, allergies and medications have been reviewed.    Review of Systems     10 point review of systems was conducted and only the pertinent positives and pertinent negatives are noted above in the HPI section.    Physical Examination  General appearance: alert, cooperative, no distress  HEENT: normocephalic, atraumatic, PERRLA   Neck: trachea midline, no LAD, no thyromegaly, no neck stiffness  Lungs: clear to auscultation, no wheezes, rales or rhonchi, symmetric air entry  Heart: normal rate, regular rhythm, normal S1, S2, no murmurs, rubs, clicks or gallops  Abdomen: soft, nontender, nondistended, no rigidity, rebound, or guarding. Hyperactive bowel sounds   Skin: No rashes or abnormal skin lesions, no apparent jaundice or bruising  Extremities: Full ROM of all extremities, peripheral pulses normal, no unilateral leg swelling or calf tenderness   Neurological:alert, oriented, normal speech, no new focal findings or movement disorder noted from baseline      BP Readings from Last 3 Encounters:   10/23/24 120/82   24 124/80   24 116/86     Wt Readings from Last 3 Encounters:   10/23/24 (!) 186.1 kg (410 lb 4.4 oz)   24 (!) 179.5 kg (395 lb 11.6 oz)   24 (!) 179.5 kg (395 lb 11.6 oz)     /82 (BP Location: Left arm, Patient Position: Sitting)   Pulse 90   Temp  "97.7 °F (36.5 °C) (Oral)   Ht 6' 3" (1.905 m)   Wt (!) 186.1 kg (410 lb 4.4 oz)   SpO2 95%   BMI 51.28 kg/m²    274}  Laboratory: I have reviewed old labs below:    274}    Lab Results   Component Value Date    WBC 6.41 04/03/2024    HGB 15.1 04/03/2024    HCT 45.7 04/03/2024    MCV 92 04/03/2024     04/03/2024     04/03/2024    K 4.5 04/03/2024     04/03/2024    CALCIUM 9.3 04/03/2024    PHOS 3.5 03/17/2019    CO2 23 04/03/2024     (H) 04/03/2024    BUN 11 04/03/2024    CREATININE 1.1 04/03/2024    EGFRNORACEVR >60.0 04/03/2024    ANIONGAP 9 04/03/2024    PROT 7.1 04/03/2024    ALBUMIN 3.8 04/03/2024    BILITOT 0.5 04/03/2024    ALKPHOS 67 04/03/2024    ALT 28 04/03/2024    AST 14 04/03/2024    INR 1.1 11/24/2020    CHOL 233 (H) 04/03/2024    TRIG 122 04/03/2024    HDL 47 04/03/2024    LDLCALC 161.6 (H) 04/03/2024    TSH 2.095 04/03/2024    HGBA1C 7.8 (H) 04/03/2024      Lab reviewed by me: Particular labs of significance that I will monitor, workup, or treat to improve are mentioned below in diagnostic impression remarks.    Imaging/EKG: I have reviewed the pertinent results and my findings are noted in remarks.  274}    CC:   Chief Complaint   Patient presents with    Follow-up    Diabetes        274}    Assessment/Plan  Bird Feng is a 59 y.o. male who presents to clinic with:  1. Diabetes mellitus, new onset    2. Pure hypercholesterolemia    3. Class 3 severe obesity due to excess calories without serious comorbidity with body mass index (BMI) of 50.0 to 59.9 in adult    4. MANUELA on CPAP    5. Epigastric abdominal pain    6. Encounter for screening for colorectal malignant neoplasm       274}  Diagnostic Impression Remarks       59 y.o. male who presents to clinic today for follow up     This is the extent of this pleasant patient's concerns at this present time.  I also discussed the importance of close follow up to discuss labs, change or modify his medications if needed, " monitor side effects, and further evaluation of medical problems.     Additional workup planned: see labs ordered below.    See below for labs and meds ordered with associated diagnosis      1. Diabetes mellitus, new onset  - metFORMIN (GLUCOPHAGE-XR) 500 MG ER 24hr tablet; Take 1 tablet (500 mg total) by mouth 2 (two) times daily with meals.  Dispense: 180 tablet; Refill: 1  A1c as previously ordered - q3 months until <7   If elevated, discussed Mounjaro     2. Pure hypercholesterolemia  ASCVD >10%  Discussed statin therapy risk vs benefits - declined at this time   Discussed CT coronary calcium score - revisit next appt     3. Class 3 severe obesity due to excess calories without serious comorbidity with body mass index (BMI) of 50.0 to 59.9 in adult  BMI 51    I recommend the following therapeutic lifestyle changes:     Transition to a low salt, primarily plant-based diet which emphasizes:  Increase fiber with vegetables, whole grains, legumes   Increase lean protein by eating beans, legumes, fish, poultry, eggs, bison  Good dairy choices for lean protein include greek yogurt (low sugar options) and cottage cheese  Replacing butter with healthy fats, such as olive oil and nuts  Using herbs and spices instead of salt to flavor foods   Limiting red meat to no more than a few times a month   Limit added sugars (sodas, fruit juices, fancy coffee drinks)  Limit processed foods        Initiation of a graded aerobic exercise plan (goal 30-45 minutes per day 4-5 times per week)  Patient encouraged to participate in low intensity strength exercising and if unfamiliar with strength and conditioning training, I recommend joining a gym with access to a  to further instruct the patient.      If weight/obesity is a concern a reasonable weight loss goal of 1-2lbs per month to reach a goal of 5-10% weight loss in 5-6 months, or a BMI less than 25.    4. MANUELA on CPAP  Adherent. Stable     5. Epigastric abdominal  pain  - H. PYLORI ANTIBODY, IGG; Future  - Ambulatory referral/consult to Gastroenterology; Future    6. Encounter for screening for colorectal malignant neoplasm  - Cologuard Screening (Multitarget Stool DNA); Future  - Cologuard Screening (Multitarget Stool DNA)    Labs overdue, schedule previously ordered labs       RTC 3 mo f/u or prn     Ame Mar MD, [facility]   Family Medicine Physician  10/23/2024

## 2024-10-24 LAB
BASOPHILS # BLD AUTO: 0.04 K/UL (ref 0–0.2)
BASOPHILS NFR BLD: 0.5 % (ref 0–1.9)
DIFFERENTIAL METHOD BLD: NORMAL
EOSINOPHIL # BLD AUTO: 0.1 K/UL (ref 0–0.5)
EOSINOPHIL NFR BLD: 1.2 % (ref 0–8)
ERYTHROCYTE [DISTWIDTH] IN BLOOD BY AUTOMATED COUNT: 14.3 % (ref 11.5–14.5)
H PYLORI IGG SERPL QL IA: POSITIVE
HCT VFR BLD AUTO: 46.7 % (ref 40–54)
HGB BLD-MCNC: 15.1 G/DL (ref 14–18)
IMM GRANULOCYTES # BLD AUTO: 0.03 K/UL (ref 0–0.04)
IMM GRANULOCYTES NFR BLD AUTO: 0.4 % (ref 0–0.5)
LYMPHOCYTES # BLD AUTO: 1.5 K/UL (ref 1–4.8)
LYMPHOCYTES NFR BLD: 19.7 % (ref 18–48)
MCH RBC QN AUTO: 30.1 PG (ref 27–31)
MCHC RBC AUTO-ENTMCNC: 32.3 G/DL (ref 32–36)
MCV RBC AUTO: 93 FL (ref 82–98)
MONOCYTES # BLD AUTO: 0.8 K/UL (ref 0.3–1)
MONOCYTES NFR BLD: 10.2 % (ref 4–15)
NEUTROPHILS # BLD AUTO: 5.2 K/UL (ref 1.8–7.7)
NEUTROPHILS NFR BLD: 68 % (ref 38–73)
NRBC BLD-RTO: 0 /100 WBC
PLATELET # BLD AUTO: 228 K/UL (ref 150–450)
PMV BLD AUTO: 10.2 FL (ref 9.2–12.9)
RBC # BLD AUTO: 5.01 M/UL (ref 4.6–6.2)
WBC # BLD AUTO: 7.58 K/UL (ref 3.9–12.7)

## 2024-10-25 RX ORDER — AMOXICILLIN 500 MG/1
TABLET, FILM COATED ORAL
Qty: 10 TABLET | Refills: 0 | Status: SHIPPED | OUTPATIENT
Start: 2024-10-25

## 2024-10-25 RX ORDER — CLARITHROMYCIN 500 MG/1
TABLET, FILM COATED ORAL
Qty: 20 TABLET | Refills: 0 | Status: SHIPPED | OUTPATIENT
Start: 2024-10-25

## 2024-10-25 RX ORDER — PANTOPRAZOLE SODIUM 40 MG/1
40 TABLET, DELAYED RELEASE ORAL DAILY
Qty: 20 TABLET | Refills: 0 | Status: SHIPPED | OUTPATIENT
Start: 2024-10-25

## 2024-10-25 RX ORDER — PANTOPRAZOLE SODIUM 40 MG/1
40 TABLET, DELAYED RELEASE ORAL DAILY
COMMUNITY
End: 2024-10-25 | Stop reason: SDUPTHER

## 2024-10-25 RX ORDER — CLARITHROMYCIN 500 MG/1
500 TABLET, FILM COATED ORAL
COMMUNITY
End: 2024-10-25 | Stop reason: SDUPTHER

## 2024-10-25 RX ORDER — AMOXICILLIN 500 MG/1
500 TABLET, FILM COATED ORAL EVERY 12 HOURS
COMMUNITY
End: 2024-10-25 | Stop reason: SDUPTHER

## 2024-10-25 NOTE — TELEPHONE ENCOUNTER
Notified pt positive for h.pylori , Dr Kc is sending protonix ,amoxil and biaxin to [facility].

## 2024-10-25 NOTE — TELEPHONE ENCOUNTER
Notified pt lab is positive for h. Pylori , Dr Kc is sending  protonix, amoxil and biaxin to [facility].    PAST MEDICAL HISTORY:  HTN (hypertension)

## 2024-10-29 LAB — LPA SERPL-MCNC: 81 MG/DL (ref 0–30)

## 2024-10-31 ENCOUNTER — TELEPHONE (OUTPATIENT)
Dept: GASTROENTEROLOGY | Facility: CLINIC | Age: 59
End: 2024-10-31
Payer: COMMERCIAL

## 2025-01-10 ENCOUNTER — TELEPHONE (OUTPATIENT)
Dept: FAMILY MEDICINE | Facility: CLINIC | Age: 60
End: 2025-01-10

## 2025-01-10 NOTE — TELEPHONE ENCOUNTER
Called pt on behalf of provider to inform him that the provider tried to sign on the virtual appt but on our side it said that the patient was having technical difficulties so we were unable to sign onto the virtual. Also made pt aware that the provider he scheduled with is an urgent care provider and usually does not see patients for med refills but tried to accommodate the request. Pt expressed verbal understanding. Offered to schedule the patient with another provider to fufill the request and patient denied and stated he already has an upcoming appt with Dr. Marie but just wanted to get in sooner with someone.

## 2025-02-06 ENCOUNTER — OFFICE VISIT (OUTPATIENT)
Dept: FAMILY MEDICINE | Facility: CLINIC | Age: 60
End: 2025-02-06
Payer: COMMERCIAL

## 2025-02-06 ENCOUNTER — CLINICAL SUPPORT (OUTPATIENT)
Dept: FAMILY MEDICINE | Facility: CLINIC | Age: 60
End: 2025-02-06
Payer: COMMERCIAL

## 2025-02-06 VITALS
HEIGHT: 75 IN | HEART RATE: 103 BPM | OXYGEN SATURATION: 95 % | BODY MASS INDEX: 39.17 KG/M2 | DIASTOLIC BLOOD PRESSURE: 86 MMHG | WEIGHT: 315 LBS | SYSTOLIC BLOOD PRESSURE: 136 MMHG | TEMPERATURE: 98 F

## 2025-02-06 DIAGNOSIS — Z76.89 ENCOUNTER TO ESTABLISH CARE: ICD-10-CM

## 2025-02-06 DIAGNOSIS — E78.41 ELEVATED LIPOPROTEIN(A): ICD-10-CM

## 2025-02-06 DIAGNOSIS — G47.33 OSA ON CPAP: ICD-10-CM

## 2025-02-06 DIAGNOSIS — E11.9 DIABETES MELLITUS, NEW ONSET: ICD-10-CM

## 2025-02-06 DIAGNOSIS — E66.01 CLASS 3 SEVERE OBESITY DUE TO EXCESS CALORIES WITHOUT SERIOUS COMORBIDITY WITH BODY MASS INDEX (BMI) OF 50.0 TO 59.9 IN ADULT: ICD-10-CM

## 2025-02-06 DIAGNOSIS — E11.9 DIABETES MELLITUS, NEW ONSET: Primary | ICD-10-CM

## 2025-02-06 DIAGNOSIS — E66.813 CLASS 3 SEVERE OBESITY DUE TO EXCESS CALORIES WITHOUT SERIOUS COMORBIDITY WITH BODY MASS INDEX (BMI) OF 50.0 TO 59.9 IN ADULT: ICD-10-CM

## 2025-02-06 PROBLEM — R73.03 PREDIABETES: Status: RESOLVED | Noted: 2024-04-03 | Resolved: 2025-02-06

## 2025-02-06 LAB — HBA1C MFR BLD: 10.6 %

## 2025-02-06 PROCEDURE — 1159F MED LIST DOCD IN RCRD: CPT | Mod: CPTII,S$GLB,,

## 2025-02-06 PROCEDURE — 99215 OFFICE O/P EST HI 40 MIN: CPT | Mod: 25,S$GLB,,

## 2025-02-06 PROCEDURE — 3075F SYST BP GE 130 - 139MM HG: CPT | Mod: CPTII,S$GLB,,

## 2025-02-06 PROCEDURE — 3079F DIAST BP 80-89 MM HG: CPT | Mod: CPTII,S$GLB,,

## 2025-02-06 PROCEDURE — 83036 HEMOGLOBIN GLYCOSYLATED A1C: CPT | Mod: QW,S$GLB,,

## 2025-02-06 PROCEDURE — 92228 IMG RTA DETC/MNTR DS PHY/QHP: CPT | Mod: TC,S$GLB,, | Performed by: FAMILY MEDICINE

## 2025-02-06 PROCEDURE — 2025F 7 FLD RTA PHOTO W/O RTNOPTHY: CPT | Mod: CPTII,S$GLB,, | Performed by: OPTOMETRIST

## 2025-02-06 PROCEDURE — 3008F BODY MASS INDEX DOCD: CPT | Mod: CPTII,S$GLB,,

## 2025-02-06 PROCEDURE — 3046F HEMOGLOBIN A1C LEVEL >9.0%: CPT | Mod: CPTII,S$GLB,,

## 2025-02-06 PROCEDURE — 92228 IMG RTA DETC/MNTR DS PHY/QHP: CPT | Mod: 26,S$GLB,, | Performed by: OPTOMETRIST

## 2025-02-06 PROCEDURE — 99999 PR PBB SHADOW E&M-EST. PATIENT-LVL V: CPT | Mod: PBBFAC,,,

## 2025-02-06 RX ORDER — METFORMIN HYDROCHLORIDE 500 MG/1
500 TABLET, EXTENDED RELEASE ORAL 2 TIMES DAILY WITH MEALS
Qty: 180 TABLET | Refills: 1 | Status: SHIPPED | OUTPATIENT
Start: 2025-02-06 | End: 2025-08-05

## 2025-02-06 RX ORDER — TIRZEPATIDE 5 MG/.5ML
5 INJECTION, SOLUTION SUBCUTANEOUS
Qty: 2 ML | Refills: 2 | Status: SHIPPED | OUTPATIENT
Start: 2025-03-05 | End: 2025-02-06

## 2025-02-06 RX ORDER — ATORVASTATIN CALCIUM 40 MG/1
40 TABLET, FILM COATED ORAL DAILY
Qty: 90 TABLET | Refills: 3 | Status: SHIPPED | OUTPATIENT
Start: 2025-02-06 | End: 2026-02-06

## 2025-02-06 RX ORDER — BLOOD-GLUCOSE SENSOR
1 EACH MISCELLANEOUS
Qty: 3 EACH | Refills: 5 | Status: SHIPPED | OUTPATIENT
Start: 2025-02-06 | End: 2026-02-06

## 2025-02-06 RX ORDER — TIRZEPATIDE 2.5 MG/.5ML
2.5 INJECTION, SOLUTION SUBCUTANEOUS
Qty: 2 ML | Refills: 0 | Status: SHIPPED | OUTPATIENT
Start: 2025-02-06

## 2025-02-06 RX ORDER — TIRZEPATIDE 2.5 MG/.5ML
2.5 INJECTION, SOLUTION SUBCUTANEOUS
Qty: 2 ML | Refills: 0 | Status: SHIPPED | OUTPATIENT
Start: 2025-02-06 | End: 2025-02-06

## 2025-02-06 RX ORDER — TIRZEPATIDE 5 MG/.5ML
5 INJECTION, SOLUTION SUBCUTANEOUS
Qty: 2 ML | Refills: 2 | Status: SHIPPED | OUTPATIENT
Start: 2025-03-05

## 2025-02-06 NOTE — PROGRESS NOTES
Bird Feng is a 60 y.o. male here for a diabetic eye screening with non-dilated fundus photos per fan causey.    Patient cooperative?: Yes  Small pupils?: No  Last eye exam: unknown, about 2 years ago    For exam results, see Encounter Report.

## 2025-02-06 NOTE — PROGRESS NOTES
SUBJECTIVE:      Patient ID: Bird Feng is a 60 y.o. male.    Chief Complaint: Establish Care and Diabetes (Checks blood sugar at home and has been running average 204)    History of Present Illness    CHIEF COMPLAINT:  Bird presents to establish care and discuss concerns about elevated blood sugar.    HPI:  Bird was diagnosed with diabetes in June 2024 and initially started treatment with Metformin, which appeared effective. He began using a Dexcom continuous glucose monitor to track his blood sugar. At a follow-up visit in October, patient reports poor medication adherence, failing to take his Metformin regularly. His A1c had decreased slightly at that time.    Recently, the patient started a low-carb diet and began walking for 30 minutes daily. He had malaise yesterday. His Dexcom device showed blood sugar of approximately 300 mg/dL throughout the day, dropping to about 180 mg/dL at night, and rising back to 250 mg/dL. This represents a significant increase from his previous glucose profile, where morning levels would rise to about 180 mg/dL, settle around 160 mg/dL, and reach a low point of about 120 mg/dL in the late afternoon or early evening.    Bird reports tingling in his feet about a minute after walking and occasional swelling in his lower extremities. He admits to poor dietary control, particularly regarding sweets and carbohydrates, especially during the recent holiday period. He has been inconsistent with taking his Metformin as prescribed. A1C in office today was 10.6. Denies increase thirst, hunger, or urination.     Bird denies chest pain, chest tightness, shortness of breath, bleeding in stool, blood in stool, dark stools, or changes in bowel movements. He denies any history of diabetic retinopathy or cataracts. Bird denies general anxiety, only reporting anxiety related to CPAP use.    MEDICATIONS:  Bird is on Metformin Extended Release 500 mg twice daily for diabetes. He previously used  Klonopin for CPAP-related anxiety but has discontinued its use.    Chronic MEDICAL HISTORY:  Bird has a history of diabetes, diagnosed in June 2023. He also has an anxiety disorder related to CPAP use, high cholesterol, obesity, and sleep apnea for which does not use his CPAP. Bird has been treated for H. pylori.    TEST RESULTS:  Bird's A1c was 7.1 three months ago, with an initial reading of 7.8. His current A1c is 10.6. Prior to the current visit, his LDL cholesterol was 161.    SURGICAL HISTORY:  Bird underwent rib surgery 3 years ago, during which metal ribs were inserted after he fell off a horse and broke 8 ribs.      ROS:  General: -fever, -chills, +fatigue, -weight gain, -weight loss  Eyes: -vision changes, -redness, -discharge  ENT: -ear pain, -nasal congestion, -sore throat  Cardiovascular: -chest pain, -palpitations, -lower extremity edema, -chest tightness  Respiratory: -cough, -shortness of breath  Gastrointestinal: -abdominal pain, -nausea, -vomiting, -diarrhea, -constipation, -blood in stool, -melena  Genitourinary: -dysuria, -hematuria, -frequency  Musculoskeletal: -joint pain, -muscle pain  Skin: -rash, -lesion  Neurological: -headache, -dizziness, -numbness, -tingling, -weakness  Psychiatric: -anxiety, -depression, -sleep difficulty        Review of Systems    Past Medical History:   Diagnosis Date    Atrial fibrillation 2006    Cardioversion    Atrial fibrillation     Hypertension     Obese     Sleep apnea      Current Outpatient Medications   Medication Sig Dispense Refill    atorvastatin (LIPITOR) 40 MG tablet Take 1 tablet (40 mg total) by mouth once daily. 90 tablet 3    blood-glucose sensor (DEXCOM G7 SENSOR) Sheila 1 each by Misc.(Non-Drug; Combo Route) route every 10 days. 3 each 5    metFORMIN (GLUCOPHAGE-XR) 500 MG ER 24hr tablet Take 1 tablet (500 mg total) by mouth 2 (two) times daily with meals. 180 tablet 1    tirzepatide (MOUNJARO) 2.5 mg/0.5 mL PnIj Inject 2.5 mg into the skin every  7 days. For 4 week, then increase to 5mg every 7 days. 2 mL 0    [START ON 3/5/2025] tirzepatide (MOUNJARO) 5 mg/0.5 mL PnIj Inject 5 mg into the skin every 7 days. 2 mL 2     No current facility-administered medications for this visit.     Review of patient's allergies indicates:  No Known Allergies   Past Surgical History:   Procedure Laterality Date    CARDIOVERSION  2006    X2    OPEN REDUCTION AND INTERNAL FIXATION (ORIF) OF FRACTURE OF RIB Left 3/15/2019    Procedure: ORIF, FRACTURE, RIBS;  Surgeon: Eric Garrett MD;  Location: [facility];  Service: Cardiothoracic;  Laterality: Left;    TONSILLECTOMY       Social History     Socioeconomic History    Marital status:    Tobacco Use    Smoking status: Never    Smokeless tobacco: Never   Substance and Sexual Activity    Alcohol use: Yes     Alcohol/week: 4.2 standard drinks of alcohol     Types: 5 drink(s) per week     Comment: Socially    Drug use: No     Social Drivers of Health     Financial Resource Strain: Low Risk  (7/16/2024)    Overall Financial Resource Strain (CARDIA)     Difficulty of Paying Living Expenses: Not hard at all   Food Insecurity: No Food Insecurity (7/16/2024)    Hunger Vital Sign     Worried About Running Out of Food in the Last Year: Never true     Ran Out of Food in the Last Year: Never true   Transportation Needs: No Transportation Needs (4/1/2024)    PRAPARE - Transportation     Lack of Transportation (Medical): No     Lack of Transportation (Non-Medical): No   Physical Activity: Insufficiently Active (7/16/2024)    Exercise Vital Sign     Days of Exercise per Week: 3 days     Minutes of Exercise per Session: 30 min   Stress: Stress Concern Present (7/16/2024)    [employer] - Occupational Stress Questionnaire     Feeling of Stress : Very much   Housing Stability: Low Risk  (4/1/2024)    Housing Stability Vital Sign     Unable to Pay for Housing in the Last Year: No     Number of Places Lived in the  "Last Year: 1     Unstable Housing in the Last Year: No     Family History   Problem Relation Name Age of Onset    Hypertension Mother      Hypertension Father      Heart disease Father      Abnormal  screen Father            OBJECTIVE:      Vitals:    25 [phone] 25 0936   BP: (!) 143/90 136/86   BP Location: Right forearm    Patient Position: Sitting    Pulse: 103    Temp: 98.1 °F (36.7 °C)    TempSrc: Oral    SpO2: 95%    Weight: (!) 181.5 kg (400 lb 1.6 oz)    Height: 6' 3" (1.905 m)      Physical Exam  Vitals and nursing note reviewed.   Constitutional:       General: He is not in acute distress.     Appearance: Normal appearance. He is well-developed. He is obese. He is not ill-appearing or toxic-appearing.      Comments: BMI is 50   HENT:      Head: Normocephalic and atraumatic.      Right Ear: Tympanic membrane, ear canal and external ear normal. There is no impacted cerumen.      Left Ear: Tympanic membrane, ear canal and external ear normal. There is no impacted cerumen.      Nose: Nose normal. No congestion or rhinorrhea.      Mouth/Throat:      Mouth: Mucous membranes are moist.      Pharynx: Oropharynx is clear. No oropharyngeal exudate or posterior oropharyngeal erythema.   Eyes:      General: No scleral icterus.        Right eye: No discharge.         Left eye: No discharge.      Extraocular Movements: Extraocular movements intact.      Conjunctiva/sclera: Conjunctivae normal.      Pupils: Pupils are equal, round, and reactive to light.   Neck:      Thyroid: No thyroid mass or thyromegaly.      Trachea: Trachea normal.   Cardiovascular:      Rate and Rhythm: Normal rate and regular rhythm.      Pulses:           Dorsalis pedis pulses are 1+ on the right side and 1+ on the left side.        Posterior tibial pulses are 1+ on the right side and 1+ on the left side.      Heart sounds: Normal heart sounds. No murmur heard.  Pulmonary:      Effort: Pulmonary effort is normal. No respiratory " distress.      Breath sounds: Normal breath sounds. No wheezing.   Abdominal:      General: Bowel sounds are normal. There is no distension.      Palpations: Abdomen is soft. There is no mass.      Tenderness: There is no abdominal tenderness. There is no right CVA tenderness, left CVA tenderness, guarding or rebound.      Hernia: No hernia is present.   Musculoskeletal:         General: Normal range of motion.      Cervical back: Normal range of motion and neck supple. No tenderness.      Right lower leg: Edema (1/4) present.      Left lower leg: Edema (1/4) present.   Feet:      Right foot:      Protective Sensation: 10 sites tested.  10 sites sensed.      Skin integrity: Dry skin present.      Toenail Condition: Right toenails are normal.      Left foot:      Protective Sensation: 10 sites tested.  10 sites sensed.      Skin integrity: Dry skin present.      Toenail Condition: Left toenails are normal.   Lymphadenopathy:      Cervical: No cervical adenopathy.   Skin:     General: Skin is warm and dry.      Coloration: Skin is not pale.      Findings: No erythema or rash.   Neurological:      Mental Status: He is alert and oriented to person, place, and time.   Psychiatric:         Mood and Affect: Mood normal.         Behavior: Behavior normal.         Thought Content: Thought content normal.         Judgment: Judgment normal.            Assessment:       1. Diabetes mellitus, new onset    2. Encounter to establish care    3. Class 3 severe obesity due to excess calories without serious comorbidity with body mass index (BMI) of 50.0 to 59.9 in adult    4. Elevated lipoprotein(a)    5. MANUELA on CPAP        Plan:       Assessment & Plan    - Patient's A1c increased to 10.6, indicating poor glucose control  - Recommend starting GLP-1 receptor agonist (Mounjaro) in addition to continuing Metformin to improve glucose control and avoid insulin initiation  - Advised starting statin therapy due to elevated LDL (161) and  increased cardiovascular risk associated with diabetes  - Noted signs of poor circulation with +1 edema in feet, considering future diuretic therapy if edema persists        Diabetes mellitus, new onset  Patient's diabetes is not controlled.  Noted with a A1c of 10.6.  Patient to continue metformin 500 mg extended release twice a day.  Educated patient on mechanism of action of medication and side effects.  Initiated Mounjaro 2.5 mg x 4 weeks and then increase to 5 mg weekly.  Completed foot exam in office.  Initiated statin.  Referred patient to diabetic educator.  Referred patient to get eye diabetic eye exam completed.  Discussed DM risks factors, types of treatment, need for annual eye exam, routine dental care, blood pressure control, lipid monitoring/control and routine inspection of feet.  Encouraged Lifestyle modifications: exercise, weight loss, smoking cessation, and diet control   -Limit: processed foods, simple carbs/sugars, sodas, pasta, etc. .   -Consume more: lean meats, fish, fruits, vegetables, whole grains, beans, lentils, and nuts.  -Moderate cardiovascular exercise for 30 min 3-4 days a week. (Promotes weight loss)  Glycemic goals: Fasting blood glucose , Bedtime glucose 100-140, A1C less than 7%  Bring glucose diary to each visit.  -     POCT HEMOGLOBIN A1C  -     Diabetic Eye Screening Photo; Future  -     Foot Exam Performed  -     Discontinue: tirzepatide (MOUNJARO) 5 mg/0.5 mL PnIj; Inject 5 mg into the skin every 7 days.  Dispense: 2 mL; Refill: 2  -     Discontinue: tirzepatide (MOUNJARO) 2.5 mg/0.5 mL PnIj; Inject 2.5 mg into the skin every 7 days. For 4 week, then increase to 5mg every 7 days.  Dispense: 2 mL; Refill: 0  -     tirzepatide (MOUNJARO) 2.5 mg/0.5 mL PnIj; Inject 2.5 mg into the skin every 7 days. For 4 week, then increase to 5mg every 7 days.  Dispense: 2 mL; Refill: 0  -     tirzepatide (MOUNJARO) 5 mg/0.5 mL PnIj; Inject 5 mg into the skin every 7 days.  Dispense: 2  mL; Refill: 2  -     blood-glucose sensor (DEXCOM G7 SENSOR) Sheila; 1 each by Misc.(Non-Drug; Combo Route) route every 10 days.  Dispense: 3 each; Refill: 5  -     Ambulatory referral/consult to Diabetes Education; Future; Expected date: 02/13/2025  -     metFORMIN (GLUCOPHAGE-XR) 500 MG ER 24hr tablet; Take 1 tablet (500 mg total) by mouth 2 (two) times daily with meals.  Dispense: 180 tablet; Refill: 1  -     CBC Auto Differential; Future; Expected date: 02/06/2025  -     Comprehensive Metabolic Panel; Future; Expected date: 02/06/2025  -     Lipid Panel; Future; Expected date: 02/06/2025  -     TSH; Future; Expected date: 02/06/2025  -     Microalbumin/Creatinine Ratio, Urine; Future; Expected date: 02/06/2025  -     HEMOGLOBIN A1C; Future; Expected date: 02/06/2025    Encounter to establish care    Class 3 severe obesity due to excess calories without serious comorbidity with body mass index (BMI) of 50.0 to 59.9 in adult    Elevated lipoprotein(a)  Risks factors associated with HLD are stroke, heart attack, coronary artery disease, and peripheral vascular disease..  It is important to incorporate lifestyle modifications, dietary changes, and increasing physical exercise, in addition to medication management.   Recommendations to:   -Limit: red meat, butter, fried foods, cheese, and other food with high saturated fat contents.  -Consume more: lean meats, fish, fruits, vegetables, whole grains, beans, lentils, and nuts.  -Increase fiber: oatmeal, bran, or fiber supplement.   -Weight loss, 30-45 min of cardiovascular exercise daily, DM control,   -     Comprehensive Metabolic Panel; Future; Expected date: 02/06/2025  -     Lipid Panel; Future; Expected date: 02/06/2025  -     atorvastatin (LIPITOR) 40 MG tablet; Take 1 tablet (40 mg total) by mouth once daily.  Dispense: 90 tablet; Refill: 3    MANUELA on CPAP  Patient is noncompliant with CPAP.  Patient reports that the CPAP machine gives him anxiety and panic  attacks.    I spent a total of 45 minutes on the day of the visit.  This includes face to face time and non-face to face time preparing to see the patient (eg, review of tests), obtaining and/or reviewing separately obtained history, documenting clinical information in the electronic or other health record, independently interpreting results and communicating results to the patient/family/caregiver, or care coordinator.        Follow up in about 3 months (around 5/6/2025) for HLD, DM, LABS.      2/6/2025 JASIEL Romano, YARITZA  This note was generated with the assistance of ambient listening technology. Verbal consent was obtained by the patient and accompanying visitor(s) for the recording of patient appointment to facilitate this note. I attest to having reviewed and edited the generated note for accuracy, though some syntax or spelling errors may persist. Please contact the author of this note for any clarification.

## 2025-02-07 ENCOUNTER — PATIENT MESSAGE (OUTPATIENT)
Dept: FAMILY MEDICINE | Facility: CLINIC | Age: 60
End: 2025-02-07
Payer: COMMERCIAL

## 2025-02-08 ENCOUNTER — NURSE TRIAGE (OUTPATIENT)
Dept: ADMINISTRATIVE | Facility: CLINIC | Age: 60
End: 2025-02-08
Payer: COMMERCIAL

## 2025-02-08 NOTE — TELEPHONE ENCOUNTER
"States he was setting up a new apple watch last night and received a notification of afib.   Reports a hx of afib approx 20 yrs ago, with x2 cardioversions. Not reflected on EMR.   Current HR on apple watch is 91.  Care advice provided per protocol, with recommendation to call MD within 24 hrs for recommendation. D/t it being the weekend, advised to go to  for eval. Pt VU.     Reason for Disposition   Heart rhythm alert (e.g., "you have irregular heartbeat") from personal wearable device (e.g., Apple Watch)    Additional Information   Negative: Passed out (e.g., fainted, lost consciousness, blacked out and was not responding)   Negative: Shock suspected (e.g., cold/pale/clammy skin, too weak to stand, low BP, rapid pulse)   Negative: Difficult to awaken or acting confused (e.g., disoriented, slurred speech)   Negative: Visible sweat on face or sweat dripping down face   Negative: Unable to walk, or can only walk with assistance (e.g., requires support)   Negative: [1] Received SHOCK from implantable cardiac defibrillator AND [2] persisting symptoms (i.e., palpitations, lightheadedness)   Negative: [1] Dizziness, lightheadedness, or weakness AND [2] heart beating very rapidly (e.g., > 140 / minute)   Negative: [1] Feeling weak or lightheaded (e.g., woozy, feeling like they might faint) AND [2] heart beating very slowly (e.g., < 50 / minute)   Negative: Sounds like a life-threatening emergency to the triager   Negative: Difficulty breathing   Negative: Feeling weak or lightheaded (e.g., woozy, feeling like they might faint)   Negative: [1] Heart beating very rapidly (e.g., > 140 / minute) AND [2] present now  (Exception: During exercise.)   Negative: Heart beating very slowly (e.g., < 50 / minute)  (Exception: Athlete and heart rate normal for caller.)   Negative: New or worsened shortness of breath with activity (dyspnea on exertion)   Negative: Patient sounds very sick or weak to the triager   Negative: [1] Heart " beating very rapidly (e.g., > 140 / minute) AND [2] not present now  (Exception: During exercise.)   Negative: [1] Skipped or extra beat(s) AND [2] increases with exercise or exertion   Negative: [1] Skipped or extra beat(s) AND [2] occurs 4 or more times per minute   Negative: New or worsened ankle swelling   Negative: History of heart disease (i.e., heart attack, bypass surgery, angina, angioplasty, CHF)  (Exception: Brief heartbeat symptoms that went away and now feels well.)   Negative: Age > 60 years  (Exception: Brief heartbeat symptoms that went away and now feels well.)   Negative: Taking water pill (i.e., diuretic) or heart medication (e.g., digoxin)   Negative: Wearing a cardiac monitor (e.g., cardiac event, Holter)   Negative: [1] Received SHOCK from implantable cardiac defibrillator AND [2] now feels well    Protocols used: Heart Rate and Heartbeat [id number]

## 2025-02-14 ENCOUNTER — PATIENT OUTREACH (OUTPATIENT)
Dept: ADMINISTRATIVE | Facility: HOSPITAL | Age: 60
End: 2025-02-14
Payer: COMMERCIAL

## 2025-02-14 NOTE — PROGRESS NOTES
Care Coordination Encounter Details:       MyChart Portal Status:         []  Reviewed MyChart Portal Status offered / enrolled if applicable        Additional Notes:     MyChart Outcomes: Pt is enrolled & active          Updates Requested / Reviewed:        Updated Care Coordination Note, Care Everywhere, , External Sources: Provation, and Immunizations Reconciliation Completed or Queried: [place]         Health Maintenance Screening(s) Due:      Health Maintenance Topics Overdue:      VBHM Score: 1     Colon Cancer Screening    Pneumonia Vaccine  Shingles/Zoster Vaccine  RSV Vaccine                  Health Maintenance Topic(s) Outreach Outcomes & Actions Taken:    Colorectal Cancer Screening - Outreach Outcomes & Actions Taken  : Reminder pt portal message sent.           Additional Notes:  Called regarding overdue health maintenance and value base resources, left voice message.            Chronic Disease Management:     Diabetes Measures        Lab Results   Component Value Date    HGBA1C 7.3 (H) 10/23/2024           [x]  Reviewed chart for active Diabetes diagnosis     []  Scheduled necessary follow up appointments if needed         Additional Notes:             Hypertension Measures        BP Readings from Last 1 Encounters:   02/06/25 136/86           [x]  Reviewed chart for active Hypertension diagnosis     []  Reviewed & documented Home BP Cuff     []  Documented a Remote BP if needed & applicable     []  Scheduled necessary follow up appointments with Primary Care if needed         Additional Notes:             Provider Team Continuity:     Last PCP Visit Date: 2/6/2025          [x]  Reviewed Primary Care Provider Visits, Annual Wellness Visit, and Future          Appointments to ensure appointments have been scheduled and/or           completed        Additional Notes:             Social Determinants of Health          [x]  Reviewed, completed, and/or updated the following sections:                   Food Insecurity, Transportation Needs, Financial Resource Strain,                 Tobacco Use        Additional Notes:  Called regarding value base resources, left voice message.            Care Management, Digital Medicine, and/or Education Referrals    OPCM Risk Score: 15.5         Next Steps - Referral Actions: Digital Medicine Outcomes and Actions Taken: Pt Declined or Not Eligible        Additional Notes:

## 2025-03-03 ENCOUNTER — TELEPHONE (OUTPATIENT)
Dept: FAMILY MEDICINE | Facility: CLINIC | Age: 60
End: 2025-03-03
Payer: COMMERCIAL

## 2025-03-10 ENCOUNTER — PATIENT MESSAGE (OUTPATIENT)
Dept: ADMINISTRATIVE | Facility: HOSPITAL | Age: 60
End: 2025-03-10
Payer: COMMERCIAL

## 2025-03-11 ENCOUNTER — OFFICE VISIT (OUTPATIENT)
Dept: FAMILY MEDICINE | Facility: CLINIC | Age: 60
End: 2025-03-11
Payer: COMMERCIAL

## 2025-03-11 DIAGNOSIS — Z63.4 BEREAVEMENT: ICD-10-CM

## 2025-03-11 DIAGNOSIS — G47.9 SLEEP DISTURBANCE: ICD-10-CM

## 2025-03-11 DIAGNOSIS — F41.1 GAD (GENERALIZED ANXIETY DISORDER): Primary | ICD-10-CM

## 2025-03-11 DIAGNOSIS — F43.0 ACUTE SITUATIONAL DISTURBANCE: ICD-10-CM

## 2025-03-11 PROCEDURE — 3046F HEMOGLOBIN A1C LEVEL >9.0%: CPT | Mod: CPTII,95,, | Performed by: PHYSICIAN ASSISTANT

## 2025-03-11 PROCEDURE — 98006 SYNCH AUDIO-VIDEO EST MOD 30: CPT | Mod: 95,,, | Performed by: PHYSICIAN ASSISTANT

## 2025-03-11 PROCEDURE — 1159F MED LIST DOCD IN RCRD: CPT | Mod: CPTII,95,, | Performed by: PHYSICIAN ASSISTANT

## 2025-03-11 PROCEDURE — 1160F RVW MEDS BY RX/DR IN RCRD: CPT | Mod: CPTII,95,, | Performed by: PHYSICIAN ASSISTANT

## 2025-03-11 RX ORDER — SERTRALINE HYDROCHLORIDE 25 MG/1
25 TABLET, FILM COATED ORAL DAILY
Qty: 30 TABLET | Refills: 1 | Status: SHIPPED | OUTPATIENT
Start: 2025-03-11 | End: 2026-03-11

## 2025-03-11 RX ORDER — ALPRAZOLAM 0.5 MG/1
0.25 TABLET ORAL 2 TIMES DAILY PRN
Qty: 45 TABLET | Refills: 0 | Status: SHIPPED | OUTPATIENT
Start: 2025-03-11 | End: 2025-04-10

## 2025-03-11 SDOH — SOCIAL DETERMINANTS OF HEALTH (SDOH): DISSAPEARANCE AND DEATH OF FAMILY MEMBER: Z63.4

## 2025-03-11 NOTE — PROGRESS NOTES
"Subjective:       Patient ID: Bird Feng is a 60 y.o. male.    Chief Complaint: No chief complaint on file.    The patient location is: [place]  The chief complaint leading to consultation is: anxiety/ grief    Visit type: audiovisual    Face to Face time with patient: 19 min  30 minutes of total time spent on the encounter, which includes face to face time and non-face to face time preparing to see the patient (eg, review of tests), Obtaining and/or reviewing separately obtained history, Documenting clinical information in the electronic or other health record, Independently interpreting results (not separately reported) and communicating results to the patient/family/caregiver, or Care coordination (not separately reported).         Each patient to whom he or she provides medical services by telemedicine is:  (1) informed of the relationship between the physician and patient and the respective role of any other health care provider with respect to management of the patient; and (2) notified that he or she may decline to receive medical services by telemedicine and may withdraw from such care at any time.    Notes:     Patient presents via telemedicine to discuss recent anxiety and panic attacks. The patient's son  by suicide earlier this week. As a result, he has had waves of anxiety and panic over take him. The patient reports that he was previously prescribed alprazolam by his "sleep apnea doctor." He had this medication left over and as used 1/2 tab prn for acute panic. The patient is overwhelmed with grief and sadness and requests refill of this medication temporarily. The patient is using this sparingly. Patient denies SI or HI. He has previously tolerated this medication well and has taken a left over pill in recent days with no adverse side effects.         Review of patient's allergies indicates:  No Known Allergies    Current Medications[1]    Lab Results   Component Value Date    WBC 7.58 " 10/23/2024    HGB 15.1 10/23/2024    HCT 46.7 10/23/2024     10/23/2024    CHOL 225 (H) 10/23/2024    TRIG 145 10/23/2024    HDL 51 10/23/2024    ALT 20 10/23/2024    ALT 20 10/23/2024    AST 12 10/23/2024    AST 12 10/23/2024     10/23/2024     10/23/2024    K 4.3 10/23/2024    K 4.3 10/23/2024     10/23/2024     10/23/2024    CREATININE 1.0 10/23/2024    CREATININE 1.0 10/23/2024    BUN 13 10/23/2024    BUN 13 10/23/2024    CO2 21 (L) 10/23/2024    CO2 21 (L) 10/23/2024    TSH 2.095 04/03/2024    INR 1.1 11/24/2020    HGBA1C 7.3 (H) 10/23/2024       Review of Systems   Constitutional:  Negative for activity change and unexpected weight change.   HENT:  Negative for hearing loss, rhinorrhea and trouble swallowing.    Eyes:  Negative for discharge and visual disturbance.   Respiratory:  Negative for chest tightness and wheezing.    Cardiovascular:  Negative for chest pain and palpitations.   Gastrointestinal:  Negative for blood in stool, constipation, diarrhea and vomiting.   Endocrine: Negative for polydipsia and polyuria.   Genitourinary:  Negative for difficulty urinating, hematuria and urgency.   Musculoskeletal:  Negative for arthralgias, joint swelling and neck pain.   Neurological:  Negative for weakness and headaches.   Psychiatric/Behavioral:  Positive for dysphoric mood. Negative for confusion and self-injury. The patient is nervous/anxious.        Objective:      Physical Exam  Constitutional:       General: He is not in acute distress.     Appearance: Normal appearance.   HENT:      Head: Normocephalic and atraumatic.   Pulmonary:      Effort: Pulmonary effort is normal. No respiratory distress.   Neurological:      Mental Status: He is alert and oriented to person, place, and time.      Cranial Nerves: No cranial nerve deficit.   Psychiatric:         Behavior: Behavior normal.         Assessment:       1. DAYANNA (generalized anxiety disorder)    2. Acute situational  disturbance    3. Sleep disturbance    4. Bereavement        Plan:       Diagnoses and all orders for this visit:    DAYANNA (generalized anxiety disorder)  -     sertraline (ZOLOFT) 25 MG tablet; Take 1 tablet (25 mg total) by mouth once daily.  -     ALPRAZolam (XANAX) 0.5 MG tablet; Take 0.5 tablets (0.25 mg total) by mouth 2 (two) times daily as needed for Anxiety (panic).    Acute situational disturbance  -     sertraline (ZOLOFT) 25 MG tablet; Take 1 tablet (25 mg total) by mouth once daily.  -     ALPRAZolam (XANAX) 0.5 MG tablet; Take 0.5 tablets (0.25 mg total) by mouth 2 (two) times daily as needed for Anxiety (panic).    Sleep disturbance  -     sertraline (ZOLOFT) 25 MG tablet; Take 1 tablet (25 mg total) by mouth once daily.  -     ALPRAZolam (XANAX) 0.5 MG tablet; Take 0.5 tablets (0.25 mg total) by mouth 2 (two) times daily as needed for Anxiety (panic).    Bereavement  -     sertraline (ZOLOFT) 25 MG tablet; Take 1 tablet (25 mg total) by mouth once daily.  -     ALPRAZolam (XANAX) 0.5 MG tablet; Take 0.5 tablets (0.25 mg total) by mouth 2 (two) times daily as needed for Anxiety (panic).      Patient advised that alprazolam is a short term prescription. No refills provided and no further Rx for this medication  Will start zoloft.  Patient to follow up with me in 2 weeks.  Patient advised to reach out to me should he be interested in starting therapy/ grief counseling and we can get him the needed information.             [1]   Current Outpatient Medications:     ALPRAZolam (XANAX) 0.5 MG tablet, Take 0.5 tablets (0.25 mg total) by mouth 2 (two) times daily as needed for Anxiety (panic)., Disp: 45 tablet, Rfl: 0    atorvastatin (LIPITOR) 40 MG tablet, Take 1 tablet (40 mg total) by mouth once daily., Disp: 90 tablet, Rfl: 3    blood-glucose sensor (DEXCOM G7 SENSOR) Sheila, 1 each by Misc.(Non-Drug; Combo Route) route every 10 days., Disp: 3 each, Rfl: 5    metFORMIN (GLUCOPHAGE-XR) 500 MG ER 24hr tablet,  Take 1 tablet (500 mg total) by mouth 2 (two) times daily with meals., Disp: 180 tablet, Rfl: 1    sertraline (ZOLOFT) 25 MG tablet, Take 1 tablet (25 mg total) by mouth once daily., Disp: 30 tablet, Rfl: 1    tirzepatide (MOUNJARO) 2.5 mg/0.5 mL PnIj, Inject 2.5 mg into the skin every 7 days. For 4 week, then increase to 5mg every 7 days., Disp: 2 mL, Rfl: 0    tirzepatide (MOUNJARO) 5 mg/0.5 mL PnIj, Inject 5 mg into the skin every 7 days., Disp: 2 mL, Rfl: 2

## 2025-03-24 ENCOUNTER — PATIENT MESSAGE (OUTPATIENT)
Dept: FAMILY MEDICINE | Facility: CLINIC | Age: 60
End: 2025-03-24
Payer: COMMERCIAL

## 2025-04-02 ENCOUNTER — PATIENT MESSAGE (OUTPATIENT)
Dept: ADMINISTRATIVE | Facility: HOSPITAL | Age: 60
End: 2025-04-02
Payer: COMMERCIAL

## 2025-04-22 DIAGNOSIS — E11.9 DIABETES MELLITUS, NEW ONSET: ICD-10-CM

## 2025-04-23 RX ORDER — TIRZEPATIDE 2.5 MG/.5ML
2.5 INJECTION, SOLUTION SUBCUTANEOUS
Qty: 2 ML | Refills: 0 | OUTPATIENT
Start: 2025-04-23

## 2025-04-24 ENCOUNTER — LAB VISIT (OUTPATIENT)
Dept: LAB | Facility: HOSPITAL | Age: 60
End: 2025-04-24
Payer: COMMERCIAL

## 2025-04-24 DIAGNOSIS — E11.9 DIABETES MELLITUS, NEW ONSET: ICD-10-CM

## 2025-04-24 DIAGNOSIS — E78.41 ELEVATED LIPOPROTEIN(A): ICD-10-CM

## 2025-04-24 LAB
ABSOLUTE EOSINOPHIL (OHS): 0.06 K/UL
ABSOLUTE MONOCYTE (OHS): 0.63 K/UL (ref 0.3–1)
ABSOLUTE NEUTROPHIL COUNT (OHS): 5.21 K/UL (ref 1.8–7.7)
ALBUMIN SERPL BCP-MCNC: 3.7 G/DL (ref 3.5–5.2)
ALBUMIN/CREAT UR: 5.6 UG/MG
ALP SERPL-CCNC: 65 UNIT/L (ref 40–150)
ALT SERPL W/O P-5'-P-CCNC: 32 UNIT/L (ref 10–44)
ANION GAP (OHS): 11 MMOL/L (ref 8–16)
AST SERPL-CCNC: 16 UNIT/L (ref 11–45)
BASOPHILS # BLD AUTO: 0.04 K/UL
BASOPHILS NFR BLD AUTO: 0.5 %
BILIRUB SERPL-MCNC: 0.5 MG/DL (ref 0.1–1)
BUN SERPL-MCNC: 12 MG/DL (ref 8–23)
CALCIUM SERPL-MCNC: 9.4 MG/DL (ref 8.7–10.5)
CHLORIDE SERPL-SCNC: 108 MMOL/L (ref 95–110)
CHOLEST SERPL-MCNC: 230 MG/DL (ref 120–199)
CHOLEST/HDLC SERPL: 5.5 {RATIO} (ref 2–5)
CO2 SERPL-SCNC: 21 MMOL/L (ref 23–29)
CREAT SERPL-MCNC: 1 MG/DL (ref 0.5–1.4)
CREAT UR-MCNC: 198 MG/DL (ref 23–375)
EAG (OHS): 140 MG/DL (ref 68–131)
ERYTHROCYTE [DISTWIDTH] IN BLOOD BY AUTOMATED COUNT: 14 % (ref 11.5–14.5)
GFR SERPLBLD CREATININE-BSD FMLA CKD-EPI: >60 ML/MIN/1.73/M2
GLUCOSE SERPL-MCNC: 106 MG/DL (ref 70–110)
HBA1C MFR BLD: 6.5 % (ref 4–5.6)
HCT VFR BLD AUTO: 47.1 % (ref 40–54)
HDLC SERPL-MCNC: 42 MG/DL (ref 40–75)
HDLC SERPL: 18.3 % (ref 20–50)
HGB BLD-MCNC: 15.4 GM/DL (ref 14–18)
IMM GRANULOCYTES # BLD AUTO: 0.02 K/UL (ref 0–0.04)
IMM GRANULOCYTES NFR BLD AUTO: 0.3 % (ref 0–0.5)
LDLC SERPL CALC-MCNC: 141.2 MG/DL (ref 63–159)
LYMPHOCYTES # BLD AUTO: 1.46 K/UL (ref 1–4.8)
MCH RBC QN AUTO: 30.4 PG (ref 27–31)
MCHC RBC AUTO-ENTMCNC: 32.7 G/DL (ref 32–36)
MCV RBC AUTO: 93 FL (ref 82–98)
MICROALBUMIN UR-MCNC: 11 UG/ML (ref ?–5000)
NONHDLC SERPL-MCNC: 188 MG/DL
NUCLEATED RBC (/100WBC) (OHS): 0 /100 WBC
PLATELET # BLD AUTO: 228 K/UL (ref 150–450)
PMV BLD AUTO: 10.3 FL (ref 9.2–12.9)
POTASSIUM SERPL-SCNC: 4.3 MMOL/L (ref 3.5–5.1)
PROT SERPL-MCNC: 7 GM/DL (ref 6–8.4)
RBC # BLD AUTO: 5.06 M/UL (ref 4.6–6.2)
RELATIVE EOSINOPHIL (OHS): 0.8 %
RELATIVE LYMPHOCYTE (OHS): 19.7 % (ref 18–48)
RELATIVE MONOCYTE (OHS): 8.5 % (ref 4–15)
RELATIVE NEUTROPHIL (OHS): 70.2 % (ref 38–73)
SODIUM SERPL-SCNC: 140 MMOL/L (ref 136–145)
TRIGL SERPL-MCNC: 234 MG/DL (ref 30–150)
TSH SERPL-ACNC: 2.03 UIU/ML (ref 0.4–4)
WBC # BLD AUTO: 7.42 K/UL (ref 3.9–12.7)

## 2025-04-24 PROCEDURE — 84443 ASSAY THYROID STIM HORMONE: CPT

## 2025-04-24 PROCEDURE — 80061 LIPID PANEL: CPT

## 2025-04-24 PROCEDURE — 82570 ASSAY OF URINE CREATININE: CPT

## 2025-04-24 PROCEDURE — 80053 COMPREHEN METABOLIC PANEL: CPT

## 2025-04-24 PROCEDURE — 85025 COMPLETE CBC W/AUTO DIFF WBC: CPT

## 2025-04-24 PROCEDURE — 83036 HEMOGLOBIN GLYCOSYLATED A1C: CPT

## 2025-04-24 PROCEDURE — 36415 COLL VENOUS BLD VENIPUNCTURE: CPT | Mod: PN

## 2025-04-25 ENCOUNTER — RESULTS FOLLOW-UP (OUTPATIENT)
Dept: FAMILY MEDICINE | Facility: CLINIC | Age: 60
End: 2025-04-25

## 2025-05-01 DIAGNOSIS — E11.9 DIABETES MELLITUS, NEW ONSET: ICD-10-CM

## 2025-05-01 RX ORDER — METFORMIN HYDROCHLORIDE 500 MG/1
500 TABLET, EXTENDED RELEASE ORAL 2 TIMES DAILY WITH MEALS
Qty: 180 TABLET | Refills: 1 | Status: CANCELLED | OUTPATIENT
Start: 2025-05-01 | End: 2025-10-28

## 2025-05-06 ENCOUNTER — OFFICE VISIT (OUTPATIENT)
Dept: FAMILY MEDICINE | Facility: CLINIC | Age: 60
End: 2025-05-06
Payer: COMMERCIAL

## 2025-05-06 VITALS
OXYGEN SATURATION: 97 % | SYSTOLIC BLOOD PRESSURE: 120 MMHG | HEART RATE: 76 BPM | DIASTOLIC BLOOD PRESSURE: 78 MMHG | WEIGHT: 315 LBS | BODY MASS INDEX: 46.24 KG/M2

## 2025-05-06 DIAGNOSIS — I49.9 IRREGULAR HEART BEAT: Primary | ICD-10-CM

## 2025-05-06 DIAGNOSIS — I48.91 ATRIAL FIBRILLATION, UNSPECIFIED TYPE: ICD-10-CM

## 2025-05-06 DIAGNOSIS — E11.9 DIABETES MELLITUS, NEW ONSET: ICD-10-CM

## 2025-05-06 PROCEDURE — 99999 PR PBB SHADOW E&M-EST. PATIENT-LVL III: CPT | Mod: PBBFAC,,,

## 2025-05-06 PROCEDURE — 93010 ELECTROCARDIOGRAM REPORT: CPT | Mod: S$GLB,,, | Performed by: INTERNAL MEDICINE

## 2025-05-06 RX ORDER — METFORMIN HYDROCHLORIDE 500 MG/1
500 TABLET, EXTENDED RELEASE ORAL 2 TIMES DAILY WITH MEALS
Qty: 180 TABLET | Refills: 1 | Status: SHIPPED | OUTPATIENT
Start: 2025-05-06 | End: 2025-11-02

## 2025-05-06 RX ORDER — METOPROLOL SUCCINATE 25 MG/1
25 TABLET, EXTENDED RELEASE ORAL DAILY
Qty: 90 TABLET | Refills: 3 | Status: SHIPPED | OUTPATIENT
Start: 2025-05-06 | End: 2026-05-06

## 2025-05-06 RX ORDER — TIRZEPATIDE 5 MG/.5ML
5 INJECTION, SOLUTION SUBCUTANEOUS
Qty: 2 ML | Refills: 2 | Status: SHIPPED | OUTPATIENT
Start: 2025-05-06

## 2025-05-06 NOTE — PROGRESS NOTES
SUBJECTIVE:      Patient ID: Bird Feng is a 60 y.o. male.    Chief Complaint: Hyperlipidemia, Diabetes, and Labs follow Up    History of Present Illness    CHIEF COMPLAINT:  Bird presents today for follow up of diabetes and hyperlipidemia. He also reports that his Apple watch has reflecting he has been in Afib for 2 months.     DIABETES MANAGEMENT:  He uses Dexcom for glucose monitoring and reports nocturnal hypoglycemic episodes with glucose dropping to 40s-50s, occurring twice weekly and lasting approximately 20 minutes with spontaneous resolution. He denies polyuria, polydipsia, or polyphagia, and notes decreased urination frequency. He continues Mounjaro 5mg weekly without side effects and Metformin 500mg twice daily at 9am and 6pm. He repots that he did not start the atorvastatin originally, but seeing his labs from a few days ago, he decided to start. Tolerating medication well and denies side effects. Denies CP, Chest tightness, joint pain, or myalgia.     CARDIOVASCULAR:  His Apple watch consistently shows AFib. He has history of atrial fibrillation 15 years ago requiring two cardioversions, with successful conversion to normal sinus rhythm. He denies chest pain, palpitations, or shortness of breath.    MENTAL HEALTH:  He reports recent loss of his son. He was prescribed Zoloft and Xanax for grief management but only took Xanax, declining to initiate Zoloft. He denies suicidal or homicidal thoughts.        Hyperlipidemia  Pertinent negatives include no chest pain or shortness of breath.   Diabetes  Pertinent negatives for hypoglycemia include no confusion, headaches or nervousness/anxiousness. Pertinent negatives for diabetes include no chest pain, no fatigue, no polydipsia, no polyphagia, no polyuria and no weakness.      Review of Systems   Constitutional:  Negative for activity change, fatigue and unexpected weight change.   HENT:  Negative for hearing loss, rhinorrhea and trouble swallowing.     Eyes:  Negative for photophobia, discharge and visual disturbance.   Respiratory:  Negative for cough, choking, chest tightness, shortness of breath, wheezing and stridor.    Cardiovascular:  Negative for chest pain, palpitations and leg swelling.   Gastrointestinal:  Negative for blood in stool, constipation, diarrhea and vomiting.   Endocrine: Negative for polydipsia, polyphagia and polyuria.   Genitourinary:  Negative for difficulty urinating, hematuria and urgency.   Musculoskeletal:  Negative for arthralgias, joint swelling and neck pain.   Neurological:  Negative for weakness and headaches.   Psychiatric/Behavioral:  Negative for confusion, dysphoric mood, self-injury, sleep disturbance and suicidal ideas. The patient is not nervous/anxious.        Past Medical History:   Diagnosis Date    Atrial fibrillation 2006    Cardioversion    Atrial fibrillation     Hypertension     Obese     Sleep apnea      Current Medications[1]  Review of patient's allergies indicates:  No Known Allergies   Past Surgical History:   Procedure Laterality Date    CARDIOVERSION  2006    X2    OPEN REDUCTION AND INTERNAL FIXATION (ORIF) OF FRACTURE OF RIB Left 3/15/2019    Procedure: ORIF, FRACTURE, RIBS;  Surgeon: Eric Garrett MD;  Location: [facility];  Service: Cardiothoracic;  Laterality: Left;    TONSILLECTOMY       Social History     Socioeconomic History    Marital status:    Tobacco Use    Smoking status: Never    Smokeless tobacco: Never   Substance and Sexual Activity    Alcohol use: Yes     Alcohol/week: 4.2 standard drinks of alcohol     Types: 5 drink(s) per week     Comment: Socially    Drug use: No     Social Drivers of Health     Financial Resource Strain: Low Risk  (7/16/2024)    Overall Financial Resource Strain (CARDIA)     Difficulty of Paying Living Expenses: Not hard at all   Food Insecurity: No Food Insecurity (7/16/2024)    Hunger Vital Sign     Worried About Running Out of Food in the Last Year: Never  true     Ran Out of Food in the Last Year: Never true   Transportation Needs: No Transportation Needs (2024)    PRAPARE - Transportation     Lack of Transportation (Medical): No     Lack of Transportation (Non-Medical): No   Physical Activity: Insufficiently Active (2024)    Exercise Vital Sign     Days of Exercise per Week: 3 days     Minutes of Exercise per Session: 30 min   Stress: Stress Concern Present (2024)    [employer] - Occupational Stress Questionnaire     Feeling of Stress : Very much   Housing Stability: Low Risk  (2024)    Housing Stability Vital Sign     Unable to Pay for Housing in the Last Year: No     Number of Places Lived in the Last Year: 1     Unstable Housing in the Last Year: No     Family History   Problem Relation Name Age of Onset    Hypertension Mother      Hypertension Father      Heart disease Father      Abnormal  screen Father            OBJECTIVE:      Vitals:    25 1410   BP: 120/78   BP Location: Right arm   Patient Position: Sitting   Pulse: 76   TempSrc: Oral   SpO2: 97%   Weight: (!) 167.8 kg (369 lb 14.9 oz)     Physical Exam  Vitals and nursing note reviewed.   Constitutional:       General: He is not in acute distress.     Appearance: Normal appearance. He is well-developed. He is obese. He is not ill-appearing or toxic-appearing.      Comments: BMI 46.  Lost 31 lbs in 3 months. 400 lbs to 369 lbs   HENT:      Head: Normocephalic and atraumatic.      Right Ear: External ear normal. There is no impacted cerumen.      Left Ear: External ear normal. There is no impacted cerumen.      Nose: Nose normal. No congestion or rhinorrhea.      Mouth/Throat:      Mouth: Mucous membranes are moist.      Pharynx: Oropharynx is clear. No oropharyngeal exudate or posterior oropharyngeal erythema.   Eyes:      General: No scleral icterus.        Right eye: No discharge.         Left eye: No discharge.      Extraocular Movements:  Extraocular movements intact.      Conjunctiva/sclera: Conjunctivae normal.      Pupils: Pupils are equal, round, and reactive to light.   Neck:      Thyroid: No thyroid mass or thyromegaly.      Trachea: Trachea normal.   Cardiovascular:      Rate and Rhythm: Normal rate. Rhythm irregular.      Pulses: Normal pulses.      Heart sounds: Normal heart sounds. No murmur heard.  Pulmonary:      Effort: Pulmonary effort is normal. No respiratory distress.      Breath sounds: Normal breath sounds. No wheezing.   Musculoskeletal:         General: Normal range of motion.      Cervical back: Normal range of motion and neck supple. No tenderness.      Right lower leg: No edema.      Left lower leg: No edema.   Lymphadenopathy:      Cervical: No cervical adenopathy.   Skin:     General: Skin is warm and dry.      Coloration: Skin is not pale.      Findings: No erythema or rash.   Neurological:      Mental Status: He is alert and oriented to person, place, and time.   Psychiatric:         Mood and Affect: Mood normal.         Behavior: Behavior normal.         Thought Content: Thought content normal.         Judgment: Judgment normal.            Assessment:       1. Irregular heart beat    2. Atrial fibrillation, unspecified type    3. Diabetes mellitus, new onset        Plan:       Assessment & Plan    A1C at goal of 6.5 (below 7).  LDL remains above goal at 141, requiring further management.  Significant weight loss from 400 lbs in February to 369 lbs currently.  Potential nocturnal hypoglycemia based on Dexcom readings.  Assessed mental health status following recent loss.    PLAN SUMMARY:  - Continue Mounjaro 5 mg subcutaneous injection weekly  - Continue Metformin 500 mg twice daily  - Continue atorvastatin therapy, consider dosage increase if needed  - Ordered in-office electrocardiogram  - In office EKG reveled Afib  - Started Eliquis 5 mg b.i.d.  - Start metoprolol succinate 25 mg daily  - ambulatory referral to  Cardiology  - Follow-up in 3 months for liver function testing and cholesterol level reassessment    TYPE 2 DIABETES MELLITUS WITH HYPOGLYCEMIA:  - Monitored patient's nocturnal hypoglycemia episodes with glucose dropping to the 50s or 40s for approximately 20 minutes before normalizing.  - A1C level is 6.5, which is at goal (below target of 7).  - Discussed options for adjusting Metformin dosage due to these episodes, including changing the evening dose to short-acting formulation, reducing to daily administration, or taking the evening dose earlier.  - Reviewed potential GI side effects of both Mounjaro and Metformin.  - Continue Mounjaro 5 mg subcutaneous injection every 7 days and Metformin 500 mg twice daily.    HYPERLIPIDEMIA:  - Current LDL is 141, requiring reduction to below 70 per AHA guidelines.  - Bird recently initiated atorvastatin therapy after previous lipid panel.  - Discussed potential adverse effects including arthralgia and myalgia.  - Will continue current atorvastatin therapy with consideration for dosage increase if LDL does not decrease sufficiently  - discuss risks associated with the hyperlipidemia.  - Plan to recheck cholesterol levels and liver function in 3 months to evaluate medication effectiveness and monitor for potential hepatic changes.    PAROXYSMAL ATRIAL FIBRILLATION:  - Bird reports atrial fibrillation episodes detected by Apple Watch, though without symptoms such as chest pain or palpitations.  - Ordered in-office electrocardiogram for evaluation.  -- In office EKG reveled Afib  - Started Eliquis 5 mg b.i.d.  - Start metoprolol succinate 25 mg daily  - ambulatory referral to Cardiology    ADJUSTMENT DISORDER WITH DEPRESSED MOOD:  - Bird experienced the death of a son, leading to difficulty coping.  - Previously prescribed sertraline (not taken) and alprazolam, with the latter being utilized to manage emotional distress following son's death.  - Will continue alprazolam for  emotional support.    FOLLOW-UP:  - Schedule follow up in 3 months for liver function testing to monitor effects of atorvastatin and reassess cholesterol levels to determine if dosage adjustment is needed.        Irregular heart beat  -     IN OFFICE EKG 12-LEAD (to Muse)    Atrial fibrillation, unspecified type  - In office EKG reveled Afib  - Started Eliquis 5 mg b.i.d.  - Start metoprolol succinate 25 mg daily  - ambulatory referral to Cardiology  -     apixaban (ELIQUIS) 5 mg Tab; Take 1 tablet (5 mg total) by mouth 2 (two) times daily.  Dispense: 60 tablet; Refill: 1  -     metoprolol succinate (TOPROL-XL) 25 MG 24 hr tablet; Take 1 tablet (25 mg total) by mouth once daily.  Dispense: 90 tablet; Refill: 3  -     Ambulatory referral/consult to Cardiology; Future; Expected date: 05/13/2025    Diabetes mellitus, new onset  -     tirzepatide (MOUNJARO) 5 mg/0.5 mL PnIj; Inject 5 mg into the skin every 7 days.  Dispense: 2 mL; Refill: 2  -     metFORMIN (GLUCOPHAGE-XR) 500 MG ER 24hr tablet; Take 1 tablet (500 mg total) by mouth 2 (two) times daily with meals.  Dispense: 180 tablet; Refill: 1  -     Hepatic function panel; Future; Expected date: 05/06/2025  -     Lipid Panel; Future; Expected date: 05/06/2025  -     Hemoglobin A1C; Future; Expected date: 05/06/2025        Follow up in about 3 months (around 8/6/2025) for HLD, LABS, DM, afib.      5/6/2025 JASIEL Romano, YARITZA  This note was generated with the assistance of ambient listening technology. Verbal consent was obtained by the patient and accompanying visitor(s) for the recording of patient appointment to facilitate this note. I attest to having reviewed and edited the generated note for accuracy, though some syntax or spelling errors may persist. Please contact the author of this note for any clarification.              [1]   Current Outpatient Medications   Medication Sig Dispense Refill    atorvastatin (LIPITOR) 40 MG tablet Take 1 tablet (40 mg  total) by mouth once daily. 90 tablet 3    blood-glucose sensor (DEXCOM G7 SENSOR) Sheila 1 each by Misc.(Non-Drug; Combo Route) route every 10 days. 3 each 5    apixaban (ELIQUIS) 5 mg Tab Take 1 tablet (5 mg total) by mouth 2 (two) times daily. 60 tablet 1    metFORMIN (GLUCOPHAGE-XR) 500 MG ER 24hr tablet Take 1 tablet (500 mg total) by mouth 2 (two) times daily with meals. 180 tablet 1    metoprolol succinate (TOPROL-XL) 25 MG 24 hr tablet Take 1 tablet (25 mg total) by mouth once daily. 90 tablet 3    tirzepatide (MOUNJARO) 5 mg/0.5 mL PnIj Inject 5 mg into the skin every 7 days. 2 mL 2     No current facility-administered medications for this visit.

## 2025-05-07 ENCOUNTER — RESULTS FOLLOW-UP (OUTPATIENT)
Dept: FAMILY MEDICINE | Facility: CLINIC | Age: 60
End: 2025-05-07

## 2025-05-07 LAB
OHS QRS DURATION: 92 MS
OHS QTC CALCULATION: 435 MS

## 2025-05-08 ENCOUNTER — TELEPHONE (OUTPATIENT)
Dept: FAMILY MEDICINE | Facility: CLINIC | Age: 60
End: 2025-05-08
Payer: COMMERCIAL

## 2025-05-08 NOTE — TELEPHONE ENCOUNTER
Jovita GUZMAN Closed    Close reason: Other  Payer: [employer] Patient's Payer Case ID: [id number]    [phone]  Note from payer: CoverMyMeds was unable to find a form.  Please consider calling the plan directly.  View History

## 2025-07-16 ENCOUNTER — PATIENT MESSAGE (OUTPATIENT)
Dept: ADMINISTRATIVE | Facility: HOSPITAL | Age: 60
End: 2025-07-16
Payer: COMMERCIAL

## 2025-08-04 ENCOUNTER — TELEPHONE (OUTPATIENT)
Dept: FAMILY MEDICINE | Facility: CLINIC | Age: 60
End: 2025-08-04
Payer: COMMERCIAL

## 2025-08-06 ENCOUNTER — TELEPHONE (OUTPATIENT)
Dept: FAMILY MEDICINE | Facility: CLINIC | Age: 60
End: 2025-08-06
Payer: COMMERCIAL

## (undated) DEVICE — STAPLER SKIN ROTATING HEAD

## (undated) DEVICE — APPLICATOR CHLORAPREP ORN 26ML

## (undated) DEVICE — SUT CTD VICRYL 1 UND BR

## (undated) DEVICE — TUBE SUCTION YANKAUER HI CAP

## (undated) DEVICE — DRAPE FLUID WARMER ORS 44X44IN

## (undated) DEVICE — SEE MEDLINE ITEM 146313

## (undated) DEVICE — LINER SUCTION 3000CC

## (undated) DEVICE — GLOVE PROTEXIS PI CLASSIC 7.0

## (undated) DEVICE — SUT 2-0 VICRYL / CT-1

## (undated) DEVICE — SOL 9P NACL IRR PIC IL

## (undated) DEVICE — Device

## (undated) DEVICE — SUT 2-0 VICRYL / SH (J417)

## (undated) DEVICE — EVACUATOR WOUND BULB 100CC

## (undated) DEVICE — SUT 2 30IN SILK BLK BRAIDE

## (undated) DEVICE — CATH THORACIC 28FR ST

## (undated) DEVICE — GLOVE 7.0 PROTEXIS PI BLUE

## (undated) DEVICE — PACK DRAPE UNIVERSAL CONVERTOR

## (undated) DEVICE — DRESSING AQUACEL AG 3.5X10IN

## (undated) DEVICE — GAUZE SPONGE PEANUT STRL

## (undated) DEVICE — SUT PERMAHAND SLIK BLK 30IN

## (undated) DEVICE — SUT 2 27IN COATED VICRYL V

## (undated) DEVICE — SEE MEDLINE ITEM 157194

## (undated) DEVICE — SLEEVE SCD EXPRESS CALF MEDIUM

## (undated) DEVICE — SUT SILK 3-0 SH DETACH 30IN

## (undated) DEVICE — SEE MEDLINE ITEM 153151

## (undated) DEVICE — SUT SILK 1 BLK BR SUTUPAK

## (undated) DEVICE — GAUZE VASELINE PETRO 3X9

## (undated) DEVICE — PACK CUSTOM UNIV BASIN SLI

## (undated) DEVICE — SUT 0 VICRYL / CT-1

## (undated) DEVICE — GLOVE 7.5 PROTEXIS PI ORTHO PF

## (undated) DEVICE — SUT SA85H SILK 2-0

## (undated) DEVICE — SPONGE IV DRAIN 4X4 STERILE

## (undated) DEVICE — DRESSING MEPILEX BORDR AG 4X12

## (undated) DEVICE — SUT 2/0 30IN SILK BLK BRAI

## (undated) DEVICE — SEE MEDLINE ITEM 147518

## (undated) DEVICE — SPONGE SUPER KERLIX 6X6.75IN

## (undated) DEVICE — SEE MEDLINE ITEM 157117

## (undated) DEVICE — SUT CTD VICRYL CT-1 27

## (undated) DEVICE — GOWN X-LG STERILE BACK

## (undated) DEVICE — DRAIN CHEST DRY SUCTION

## (undated) DEVICE — TAPE MEDIPORE 4IN X 2YDS

## (undated) DEVICE — GLOVE SURG PLYSPHRN ORTH SZ7.5

## (undated) DEVICE — APPLIER LIGACLIP LG 13IN

## (undated) DEVICE — DRAPE INCISE IOBAN 2 23X17IN

## (undated) DEVICE — ELECTRODE BLD ULTRA 4IN STRL

## (undated) DEVICE — SUT CTD VICRYL 3-0 PS-1

## (undated) DEVICE — DRESSING AQUACEL AG SILVER 4X4

## (undated) DEVICE — NDL ECLIPSE SAFETY 18GX1-1/2IN